# Patient Record
Sex: MALE | Race: WHITE | NOT HISPANIC OR LATINO | Employment: FULL TIME | URBAN - METROPOLITAN AREA
[De-identification: names, ages, dates, MRNs, and addresses within clinical notes are randomized per-mention and may not be internally consistent; named-entity substitution may affect disease eponyms.]

---

## 2017-10-23 ENCOUNTER — COMMUNICATION - HEALTHEAST (OUTPATIENT)
Dept: FAMILY MEDICINE | Facility: CLINIC | Age: 52
End: 2017-10-23

## 2017-11-20 ENCOUNTER — OFFICE VISIT - HEALTHEAST (OUTPATIENT)
Dept: FAMILY MEDICINE | Facility: CLINIC | Age: 52
End: 2017-11-20

## 2017-11-20 DIAGNOSIS — E78.5 HYPERLIPIDEMIA: ICD-10-CM

## 2017-11-20 DIAGNOSIS — Z12.5 PROSTATE CANCER SCREENING: ICD-10-CM

## 2017-11-20 DIAGNOSIS — N50.819 TESTICLE PAIN: ICD-10-CM

## 2017-11-20 DIAGNOSIS — E11.9 DM (DIABETES MELLITUS) (H): ICD-10-CM

## 2017-11-20 DIAGNOSIS — Z00.00 HEALTH MAINTENANCE EXAMINATION: ICD-10-CM

## 2017-11-20 DIAGNOSIS — R00.2 PALPITATION: ICD-10-CM

## 2017-11-20 DIAGNOSIS — E11.9 DIABETES MELLITUS (H): ICD-10-CM

## 2017-11-20 DIAGNOSIS — Z23 NEED FOR VACCINATION: ICD-10-CM

## 2017-11-20 LAB
CHOLEST SERPL-MCNC: 95 MG/DL
FASTING STATUS PATIENT QL REPORTED: YES
HBA1C MFR BLD: 6.8 % (ref 3.5–6)
HCV AB SERPL QL IA: NEGATIVE
HDLC SERPL-MCNC: 30 MG/DL
LDLC SERPL CALC-MCNC: 44 MG/DL
PSA SERPL-MCNC: 1.5 NG/ML (ref 0–3.5)
TRIGL SERPL-MCNC: 106 MG/DL

## 2017-11-20 ASSESSMENT — MIFFLIN-ST. JEOR: SCORE: 2454.21

## 2017-11-21 LAB
ATRIAL RATE - MUSE: 57 BPM
DIASTOLIC BLOOD PRESSURE - MUSE: NORMAL MMHG
INTERPRETATION ECG - MUSE: NORMAL
P AXIS - MUSE: 24 DEGREES
PR INTERVAL - MUSE: 160 MS
QRS DURATION - MUSE: 116 MS
QT - MUSE: 432 MS
QTC - MUSE: 420 MS
R AXIS - MUSE: -3 DEGREES
SYSTOLIC BLOOD PRESSURE - MUSE: NORMAL MMHG
T AXIS - MUSE: -4 DEGREES
VENTRICULAR RATE- MUSE: 57 BPM

## 2017-11-30 ENCOUNTER — COMMUNICATION - HEALTHEAST (OUTPATIENT)
Dept: FAMILY MEDICINE | Facility: CLINIC | Age: 52
End: 2017-11-30

## 2017-11-30 DIAGNOSIS — I10 HTN (HYPERTENSION): ICD-10-CM

## 2017-12-01 ENCOUNTER — HOSPITAL ENCOUNTER (OUTPATIENT)
Dept: CARDIOLOGY | Facility: CLINIC | Age: 52
Discharge: HOME | End: 2017-12-01
Attending: FAMILY MEDICINE

## 2017-12-01 DIAGNOSIS — R00.2 PALPITATION: ICD-10-CM

## 2017-12-04 ENCOUNTER — AMBULATORY - HEALTHEAST (OUTPATIENT)
Dept: LAB | Facility: CLINIC | Age: 52
End: 2017-12-04

## 2017-12-04 DIAGNOSIS — I10 HTN (HYPERTENSION): ICD-10-CM

## 2017-12-05 ENCOUNTER — AMBULATORY - HEALTHEAST (OUTPATIENT)
Dept: NURSING | Facility: CLINIC | Age: 52
End: 2017-12-05

## 2017-12-05 DIAGNOSIS — I10 ESSENTIAL HYPERTENSION: ICD-10-CM

## 2018-06-21 ENCOUNTER — OFFICE VISIT (OUTPATIENT)
Dept: URGENT CARE | Facility: URGENT CARE | Age: 53
End: 2018-06-21
Payer: COMMERCIAL

## 2018-06-21 VITALS
HEART RATE: 85 BPM | SYSTOLIC BLOOD PRESSURE: 135 MMHG | DIASTOLIC BLOOD PRESSURE: 90 MMHG | OXYGEN SATURATION: 97 % | RESPIRATION RATE: 14 BRPM | WEIGHT: 315 LBS | TEMPERATURE: 98.3 F

## 2018-06-21 DIAGNOSIS — H93.8X1 CRACKLING SOUND IN EAR, RIGHT: Primary | ICD-10-CM

## 2018-06-21 PROCEDURE — 99213 OFFICE O/P EST LOW 20 MIN: CPT | Performed by: NURSE PRACTITIONER

## 2018-06-21 RX ORDER — ASPIRIN 81 MG/1
81 TABLET ORAL
COMMUNITY
End: 2022-02-03

## 2018-06-21 RX ORDER — LISINOPRIL 5 MG/1
10 TABLET ORAL
COMMUNITY
Start: 2017-11-20 | End: 2021-12-23

## 2018-06-21 RX ORDER — ATORVASTATIN CALCIUM 10 MG/1
TABLET, FILM COATED ORAL
COMMUNITY
Start: 2017-11-20 | End: 2022-02-03

## 2018-06-21 RX ORDER — OFLOXACIN 3 MG/ML
10 SOLUTION AURICULAR (OTIC) 2 TIMES DAILY
Qty: 10 ML | Refills: 0 | Status: SHIPPED | OUTPATIENT
Start: 2018-06-21 | End: 2018-06-28

## 2018-06-21 ASSESSMENT — ENCOUNTER SYMPTOMS
DIARRHEA: 0
NAUSEA: 0
RHINORRHEA: 0
COUGH: 0
VOMITING: 0
SHORTNESS OF BREATH: 0
DIAPHORESIS: 0
CHILLS: 0
SORE THROAT: 0
FEVER: 0

## 2018-06-21 NOTE — MR AVS SNAPSHOT
After Visit Summary   6/21/2018    Walter Santa    MRN: [id number]           Patient Information     Date Of Birth          1965        Visit Information        Provider Department      6/21/2018 4:05 PM Adina Jeter NP [facility]        Today's Diagnoses     Crackling sound in ear, right    -  1       Follow-ups after your visit        Additional Services     OTOLARYNGOLOGY REFERRAL       Your provider has referred you to: [facility]: [facility] [phone]   [email or web address]    Please be aware that coverage of these services is subject to the terms and limitations of your health insurance plan.  Call member services at your health plan with any benefit or coverage questions.      Please bring the following with you to your appointment:    (1) Any X-Rays, CTs or MRIs which have been performed.  Contact the facility where they were done to arrange for  prior to your scheduled appointment.   (2) List of current medications  (3) This referral request   (4) Any documents/labs given to you for this referral                  Who to contact     If you have questions or need follow up information about today's clinic visit or your schedule please contact [facility] directly at [phone].  Normal or non-critical lab and imaging results will be communicated to you by MyChart, letter or phone within 4 business days after the clinic has received the results. If you do not hear from us within 7 days, please contact the clinic through MyChart or phone. If you have a critical or abnormal lab result, we will notify you by phone as soon as possible.  Submit refill requests through [employer] or call your pharmacy and they will forward the refill request to us. Please allow 3 business days for your refill to be completed.          Additional Information About Your Visit        MyChart Information   "   [employer] lets you send messages to your doctor, view your test results, renew your prescriptions, schedule appointments and more. To sign up, go to [email or web address] . Click on \"Log in\" on the left side of the screen, which will take you to the Welcome page. Then click on \"Sign up Now\" on the right side of the page.     You will be asked to enter the access code listed below, as well as some personal information. Please follow the directions to create your username and password.     Your access code is: [email or web address]  Expires: 2018  5:01 PM     Your access code will  in 90 days. If you need help or a new code, please call your [facility] clinic or [phone].        Care EveryWhere ID     This is your Care EveryWhere ID. This could be used by other organizations to access your [facility] medical records  [id number]        Your Vitals Were     Pulse Temperature Respirations Pulse Oximetry          85 98.3  F (36.8  C) (Oral) 14 97%         Blood Pressure from Last 3 Encounters:   18 135/90    Weight from Last 3 Encounters:   18 (!) 334 lb (151.5 kg)              We Performed the Following     OTOLARYNGOLOGY REFERRAL          Today's Medication Changes          These changes are accurate as of 18  5:01 PM.  If you have any questions, ask your nurse or doctor.               Start taking these medicines.        Dose/Directions    ofloxacin 0.3 % otic solution   Commonly known as:  FLOXIN   Used for:  Crackling sound in ear, right   Started by:  Adina Jeter NP        Dose:  10 drop   Place 10 drops into the right ear 2 times daily for 7 days   Quantity:  10 mL   Refills:  0            Where to get your medicines      These medications were sent to [id number] - [place] [address] AT [facility] & [address], [place] [address]     Phone:  [phone]     ofloxacin 0.3 % otic solution                Primary Care Provider Fax #    " Physician No Ref-Primary [phone]       No address on file        Equal Access to Services     FAITHBENITEZ SCOOBY : Hadii aad ku hadtiffanymelyssa Sovenancio, waraymondda luqadaha, qaybta kapierreda aureashantalsummer, ruby weathers michellevivienne petersonmonicamelanie moran. So [facility] [phone].    ATENCIÓN: Si habla español, tiene a kaye disposición servicios gratuitos de asistencia lingüística. Llame al [phone].    We comply with applicable federal civil rights laws and [place] laws. We do not discriminate on the basis of race, color, national origin, age, disability, sex, sexual orientation, or gender identity.            Thank you!     Thank you for choosing [facility]  for your care. Our goal is always to provide you with excellent care. Hearing back from our patients is one way we can continue to improve our services. Please take a few minutes to complete the written survey that you may receive in the mail after your visit with us. Thank you!             Your Updated Medication List - Protect others around you: Learn how to safely use, store and throw away your medicines at www.disposemymeds.org.          This list is accurate as of 6/21/18  5:01 PM.  Always use your most recent med list.                   Brand Name Dispense Instructions for use Diagnosis    aspirin 81 MG EC tablet      Take 81 mg by mouth        atorvastatin 10 MG tablet    LIPITOR     TAKE 1 TABLET BY MOUTH DAILY        lisinopril 5 MG tablet    PRINIVIL/ZESTRIL     Take 10 mg by mouth        metFORMIN 1000 MG tablet    GLUCOPHAGE     TAKE 1 TABLET BY MOUTH TWICE DAILY WITH MEALS.        ofloxacin 0.3 % otic solution    FLOXIN    10 mL    Place 10 drops into the right ear 2 times daily for 7 days    Crackling sound in ear, right

## 2018-06-21 NOTE — PROGRESS NOTES
SUBJECTIVE:   Walter Santa is a 53 year old male presenting with a chief complaint of   Chief Complaint   Patient presents with     Ear Problem     Patient complains of ear crackling for 3 days.       He is an established patient of [place].    Crackling in ear    Onset of symptoms was 3 day(s) ago.  Course of illness is same.    Severity moderate  Current and Associated symptoms: crackling in right ear.  Treatment measures tried include None tried.  Predisposing factors include ill contact: none.      Review of Systems   Constitutional: Negative for chills, diaphoresis and fever.   HENT: Negative for congestion, ear pain, rhinorrhea and sore throat.         Crackling sounds in right ear   Respiratory: Negative for cough and shortness of breath.    Gastrointestinal: Negative for diarrhea, nausea and vomiting.   All other systems reviewed and are negative.      No past medical history on file.  No family history on file.  Current Outpatient Prescriptions   Medication Sig Dispense Refill     aspirin 81 MG EC tablet Take 81 mg by mouth       atorvastatin (LIPITOR) 10 MG tablet TAKE 1 TABLET BY MOUTH DAILY       lisinopril (PRINIVIL/ZESTRIL) 5 MG tablet Take 10 mg by mouth       metFORMIN (GLUCOPHAGE) 1000 MG tablet TAKE 1 TABLET BY MOUTH TWICE DAILY WITH MEALS.       ofloxacin (FLOXIN) 0.3 % otic solution Place 10 drops into the right ear 2 times daily for 7 days 10 mL 0     Social History   Substance Use Topics     Smoking status: Never Smoker     Smokeless tobacco: Never Used     Alcohol use Not on file       OBJECTIVE  /90  Pulse 85  Temp 98.3  F (36.8  C) (Oral)  Resp 14  Wt (!) 334 lb (151.5 kg)  SpO2 97%    Physical Exam   Constitutional: He appears well-developed and well-nourished. No distress.   HENT:   Head: Normocephalic and atraumatic.   Right Ear: Tympanic membrane and external ear normal.   Left Ear: Tympanic membrane and external ear normal.   Mouth/Throat: Oropharynx is clear and  moist.   Small amounts of earwax in right ear   Eyes: EOM are normal. Pupils are equal, round, and reactive to light.   Neck: Normal range of motion. Neck supple.   Pulmonary/Chest: Effort normal and breath sounds normal. No respiratory distress.   Lymphadenopathy:     He has no cervical adenopathy.   Neurological: He is alert. No cranial nerve deficit.   Skin: Skin is warm and dry. He is not diaphoretic.   Psychiatric: He has a normal mood and affect.   Nursing note and vitals reviewed.    ASSESSMENT:      ICD-10-CM    1. Crackling sound in ear, right H93.8X1 ofloxacin (FLOXIN) 0.3 % otic solution     OTOLARYNGOLOGY REFERRAL          PLAN:  Cerumenosis is noted.  Wax is removed by syringing and manual debridement. Instructions for home care to prevent wax buildup are given. Slight redness of ear canal after the syringing, TM is normal. Crackling less after the ear flushing. A referral to ENT is made incase the symptoms continue.        There are no Patient Instructions on file for this visit.

## 2018-12-04 ENCOUNTER — OFFICE VISIT - HEALTHEAST (OUTPATIENT)
Dept: INTERNAL MEDICINE | Facility: CLINIC | Age: 53
End: 2018-12-04

## 2018-12-04 DIAGNOSIS — E11.9 TYPE 2 DIABETES MELLITUS WITHOUT COMPLICATION, WITHOUT LONG-TERM CURRENT USE OF INSULIN (H): ICD-10-CM

## 2018-12-04 DIAGNOSIS — Z00.00 ROUTINE GENERAL MEDICAL EXAMINATION AT A HEALTH CARE FACILITY: ICD-10-CM

## 2018-12-04 DIAGNOSIS — I10 ESSENTIAL HYPERTENSION WITH GOAL BLOOD PRESSURE LESS THAN 140/90: ICD-10-CM

## 2018-12-04 DIAGNOSIS — D69.6 THROMBOCYTOPENIA (H): ICD-10-CM

## 2018-12-04 DIAGNOSIS — E78.5 HYPERLIPIDEMIA: ICD-10-CM

## 2018-12-04 DIAGNOSIS — E66.01 SEVERE OBESITY (H): ICD-10-CM

## 2018-12-04 DIAGNOSIS — Z12.5 SCREENING FOR PROSTATE CANCER: ICD-10-CM

## 2018-12-04 LAB
ALBUMIN SERPL-MCNC: 4.3 G/DL (ref 3.5–5)
ALP SERPL-CCNC: 64 U/L (ref 45–120)
ALT SERPL W P-5'-P-CCNC: 29 U/L (ref 0–45)
ANION GAP SERPL CALCULATED.3IONS-SCNC: 13 MMOL/L (ref 5–18)
AST SERPL W P-5'-P-CCNC: 19 U/L (ref 0–40)
BILIRUB SERPL-MCNC: 0.6 MG/DL (ref 0–1)
BUN SERPL-MCNC: 13 MG/DL (ref 8–22)
CALCIUM SERPL-MCNC: 9.6 MG/DL (ref 8.5–10.5)
CHLORIDE BLD-SCNC: 102 MMOL/L (ref 98–107)
CHOLEST SERPL-MCNC: 103 MG/DL
CO2 SERPL-SCNC: 24 MMOL/L (ref 22–31)
CREAT SERPL-MCNC: 0.83 MG/DL (ref 0.7–1.3)
ERYTHROCYTE [DISTWIDTH] IN BLOOD BY AUTOMATED COUNT: 11.7 % (ref 11–14.5)
FASTING STATUS PATIENT QL REPORTED: YES
GFR SERPL CREATININE-BSD FRML MDRD: >60 ML/MIN/1.73M2
GLUCOSE BLD-MCNC: 130 MG/DL (ref 70–125)
HBA1C MFR BLD: 7.2 % (ref 3.5–6)
HCT VFR BLD AUTO: 45.1 % (ref 40–54)
HDLC SERPL-MCNC: 35 MG/DL
HGB BLD-MCNC: 15 G/DL (ref 14–18)
LDLC SERPL CALC-MCNC: 47 MG/DL
MCH RBC QN AUTO: 30.4 PG (ref 27–34)
MCHC RBC AUTO-ENTMCNC: 33.3 G/DL (ref 32–36)
MCV RBC AUTO: 91 FL (ref 80–100)
PLATELET # BLD AUTO: 155 THOU/UL (ref 140–440)
PMV BLD AUTO: 9.5 FL (ref 7–10)
POTASSIUM BLD-SCNC: 4.7 MMOL/L (ref 3.5–5)
PROT SERPL-MCNC: 7 G/DL (ref 6–8)
PSA SERPL-MCNC: 2.7 NG/ML (ref 0–3.5)
RBC # BLD AUTO: 4.94 MILL/UL (ref 4.4–6.2)
SODIUM SERPL-SCNC: 139 MMOL/L (ref 136–145)
TRIGL SERPL-MCNC: 107 MG/DL
WBC: 6.8 THOU/UL (ref 4–11)

## 2018-12-04 ASSESSMENT — MIFFLIN-ST. JEOR: SCORE: 2443.32

## 2019-02-25 ENCOUNTER — OFFICE VISIT - HEALTHEAST (OUTPATIENT)
Dept: INTERNAL MEDICINE | Facility: CLINIC | Age: 54
End: 2019-02-25

## 2019-02-25 DIAGNOSIS — L30.0 NUMMULAR ECZEMATOUS DERMATITIS: ICD-10-CM

## 2019-02-25 ASSESSMENT — MIFFLIN-ST. JEOR: SCORE: 2443.32

## 2019-03-21 ENCOUNTER — OFFICE VISIT - HEALTHEAST (OUTPATIENT)
Dept: INTERNAL MEDICINE | Facility: CLINIC | Age: 54
End: 2019-03-21

## 2019-03-21 DIAGNOSIS — E66.01 MORBID OBESITY (H): ICD-10-CM

## 2019-03-21 DIAGNOSIS — E66.9 DIABETES MELLITUS TYPE 2 IN OBESE: ICD-10-CM

## 2019-03-21 DIAGNOSIS — E11.69 DIABETES MELLITUS TYPE 2 IN OBESE: ICD-10-CM

## 2019-03-21 DIAGNOSIS — J30.2 SEASONAL ALLERGIES: ICD-10-CM

## 2019-03-21 DIAGNOSIS — H65.191 ACUTE MEE (MIDDLE EAR EFFUSION), RIGHT: ICD-10-CM

## 2019-03-21 DIAGNOSIS — I10 ESSENTIAL HYPERTENSION WITH GOAL BLOOD PRESSURE LESS THAN 140/90: ICD-10-CM

## 2019-03-21 ASSESSMENT — MIFFLIN-ST. JEOR: SCORE: 2425.18

## 2019-03-22 ENCOUNTER — COMMUNICATION - HEALTHEAST (OUTPATIENT)
Dept: INTERNAL MEDICINE | Facility: CLINIC | Age: 54
End: 2019-03-22

## 2019-09-05 ENCOUNTER — AMBULATORY - HEALTHEAST (OUTPATIENT)
Dept: INTERNAL MEDICINE | Facility: CLINIC | Age: 54
End: 2019-09-05

## 2019-09-05 ENCOUNTER — OFFICE VISIT - HEALTHEAST (OUTPATIENT)
Dept: INTERNAL MEDICINE | Facility: CLINIC | Age: 54
End: 2019-09-05

## 2019-09-05 DIAGNOSIS — E11.42 DIABETIC POLYNEUROPATHY ASSOCIATED WITH TYPE 2 DIABETES MELLITUS (H): ICD-10-CM

## 2019-09-05 DIAGNOSIS — E66.01 MORBID OBESITY (H): ICD-10-CM

## 2019-09-05 DIAGNOSIS — E11.69 DIABETES MELLITUS TYPE 2 IN OBESE: ICD-10-CM

## 2019-09-05 DIAGNOSIS — E53.8 VITAMIN B12 DEFICIENCY (NON ANEMIC): ICD-10-CM

## 2019-09-05 DIAGNOSIS — G25.81 RESTLESS LEGS SYNDROME (RLS): ICD-10-CM

## 2019-09-05 DIAGNOSIS — E66.9 DIABETES MELLITUS TYPE 2 IN OBESE: ICD-10-CM

## 2019-09-05 DIAGNOSIS — I10 ESSENTIAL HYPERTENSION WITH GOAL BLOOD PRESSURE LESS THAN 140/90: ICD-10-CM

## 2019-09-05 LAB
ANION GAP SERPL CALCULATED.3IONS-SCNC: 10 MMOL/L (ref 5–18)
BUN SERPL-MCNC: 14 MG/DL (ref 8–22)
CALCIUM SERPL-MCNC: 9.3 MG/DL (ref 8.5–10.5)
CHLORIDE BLD-SCNC: 102 MMOL/L (ref 98–107)
CO2 SERPL-SCNC: 24 MMOL/L (ref 22–31)
CREAT SERPL-MCNC: 0.93 MG/DL (ref 0.7–1.3)
FERRITIN SERPL-MCNC: 101 NG/ML (ref 27–300)
GFR SERPL CREATININE-BSD FRML MDRD: >60 ML/MIN/1.73M2
GLUCOSE BLD-MCNC: 138 MG/DL (ref 70–125)
HBA1C MFR BLD: 7.4 % (ref 3.5–6)
HGB BLD-MCNC: 14.4 G/DL (ref 14–18)
POTASSIUM BLD-SCNC: 4 MMOL/L (ref 3.5–5)
SODIUM SERPL-SCNC: 136 MMOL/L (ref 136–145)
VIT B12 SERPL-MCNC: 242 PG/ML (ref 213–816)

## 2019-09-05 ASSESSMENT — MIFFLIN-ST. JEOR: SCORE: 2434.25

## 2019-09-06 ENCOUNTER — COMMUNICATION - HEALTHEAST (OUTPATIENT)
Dept: INTERNAL MEDICINE | Facility: CLINIC | Age: 54
End: 2019-09-06

## 2019-12-18 ENCOUNTER — OFFICE VISIT - HEALTHEAST (OUTPATIENT)
Dept: INTERNAL MEDICINE | Facility: CLINIC | Age: 54
End: 2019-12-18

## 2019-12-18 DIAGNOSIS — R29.818 SUSPECTED SLEEP APNEA: ICD-10-CM

## 2019-12-18 DIAGNOSIS — I49.9 IRREGULAR HEART BEAT: ICD-10-CM

## 2019-12-18 DIAGNOSIS — R00.2 PALPITATIONS: ICD-10-CM

## 2019-12-18 LAB
ATRIAL RATE - MUSE: 67 BPM
DIASTOLIC BLOOD PRESSURE - MUSE: NORMAL
INTERPRETATION ECG - MUSE: NORMAL
P AXIS - MUSE: 27 DEGREES
PR INTERVAL - MUSE: 158 MS
QRS DURATION - MUSE: 110 MS
QT - MUSE: 398 MS
QTC - MUSE: 420 MS
R AXIS - MUSE: -7 DEGREES
SYSTOLIC BLOOD PRESSURE - MUSE: NORMAL
T AXIS - MUSE: 2 DEGREES
VENTRICULAR RATE- MUSE: 67 BPM

## 2019-12-18 ASSESSMENT — MIFFLIN-ST. JEOR: SCORE: 2434.25

## 2019-12-27 ENCOUNTER — HOSPITAL ENCOUNTER (OUTPATIENT)
Dept: CARDIOLOGY | Facility: CLINIC | Age: 54
Discharge: HOME | End: 2019-12-27
Attending: INTERNAL MEDICINE

## 2019-12-27 DIAGNOSIS — R00.2 PALPITATIONS: ICD-10-CM

## 2019-12-27 LAB
AORTIC ROOT: 3.5 CM
AORTIC VALVE MEAN VELOCITY: 101 CM/S
ASCENDING AORTA: 3.8 CM
AV DIMENSIONLESS INDEX VTI: 0.8
AV MEAN GRADIENT: 5 MMHG
AV PEAK GRADIENT: 8.4 MMHG
AV VALVE AREA: 3.7 CM2
AV VELOCITY RATIO: 0.9
BSA FOR ECHO PROCEDURE: 2.85 M2
CV ECHO HEIGHT: 77 IN
CV ECHO WEIGHT: 330 LBS
DOP CALC AO PEAK VEL: 145 CM/S
DOP CALC AO VTI: 32.8 CM
DOP CALC LVOT AREA: 4.52 CM2
DOP CALC LVOT DIAMETER: 2.4 CM
DOP CALC LVOT PEAK VEL: 129 CM/S
DOP CALC LVOT STROKE VOLUME: 123 CM3
DOP CALC MV VTI: 37 CM
DOP CALCLVOT PEAK VEL VTI: 27.2 CM
EJECTION FRACTION: 56 % (ref 55–75)
FRACTIONAL SHORTENING: 31.3 % (ref 28–44)
INTERVENTRICULAR SEPTUM IN END DIASTOLE: 1.4 CM (ref 0.6–1)
IVS/PW RATIO: 1.1
LA AREA 1: 24.5 CM2
LA AREA 2: 21.3 CM2
LEFT ATRIUM LENGTH: 5.44 CM
LEFT ATRIUM SIZE: 4 CM
LEFT ATRIUM TO AORTIC ROOT RATIO: 1.14 NO UNITS
LEFT ATRIUM VOLUME INDEX: 28.6 ML/M2
LEFT ATRIUM VOLUME: 81.5 ML
LEFT VENTRICLE CARDIAC INDEX: 2.8 L/MIN/M2
LEFT VENTRICLE CARDIAC OUTPUT: 8 L/MIN
LEFT VENTRICLE DIASTOLIC VOLUME INDEX: 83.5 CM3/M2 (ref 34–74)
LEFT VENTRICLE DIASTOLIC VOLUME: 238 CM3 (ref 62–150)
LEFT VENTRICLE HEART RATE: 65 BPM
LEFT VENTRICLE MASS INDEX: 91.1 G/M2
LEFT VENTRICLE SYSTOLIC VOLUME INDEX: 36.5 CM3/M2 (ref 11–31)
LEFT VENTRICLE SYSTOLIC VOLUME: 104 CM3 (ref 21–61)
LEFT VENTRICULAR INTERNAL DIMENSION IN DIASTOLE: 4.8 CM (ref 4.2–5.8)
LEFT VENTRICULAR INTERNAL DIMENSION IN SYSTOLE: 3.3 CM (ref 2.5–4)
LEFT VENTRICULAR MASS: 259.6 G
LEFT VENTRICULAR OUTFLOW TRACT MEAN GRADIENT: 3 MMHG
LEFT VENTRICULAR OUTFLOW TRACT MEAN VELOCITY: 83.1 CM/S
LEFT VENTRICULAR OUTFLOW TRACT PEAK GRADIENT: 7 MMHG
LEFT VENTRICULAR POSTERIOR WALL IN END DIASTOLE: 1.3 CM (ref 0.6–1)
LV STROKE VOLUME INDEX: 43.2 ML/M2
MITRAL VALVE E/A RATIO: 0.8
MITRAL VALVE MEAN INFLOW VELOCITY: 57.1 CM/S
MITRAL VALVE PEAK VELOCITY: 106 CM/S
MV AREA VTI: 3.32 CM2
MV AVERAGE E/E' RATIO: 7.6 CM/S
MV DECELERATION TIME: 162 MS
MV E'TISSUE VEL-LAT: 10 CM/S
MV E'TISSUE VEL-MED: 7.7 CM/S
MV LATERAL E/E' RATIO: 6.8
MV MEAN GRADIENT: 2 MMHG
MV MEDIAL E/E' RATIO: 8.8
MV PEAK A VELOCITY: 83.4 CM/S
MV PEAK E VELOCITY: 67.6 CM/S
MV PEAK GRADIENT: 4.5 MMHG
MV VALVE AREA BY CONTINUITY EQUATION: 3.3 CM2
NUC REST DIASTOLIC VOLUME INDEX: 5280 LBS
NUC REST SYSTOLIC VOLUME INDEX: 77 IN
RIGHT VENTRICULAR INTERNAL DIMENSION IN DYSTOLE: 3.7 CM
TRICUSPID VALVE ANULAR PLANE SYSTOLIC EXCURSION: 2.3 CM

## 2019-12-27 ASSESSMENT — MIFFLIN-ST. JEOR: SCORE: 2434.25

## 2019-12-30 ENCOUNTER — COMMUNICATION - HEALTHEAST (OUTPATIENT)
Dept: PHARMACY | Facility: CLINIC | Age: 54
End: 2019-12-30

## 2019-12-30 DIAGNOSIS — E78.5 HYPERLIPIDEMIA: ICD-10-CM

## 2019-12-30 DIAGNOSIS — I10 ESSENTIAL HYPERTENSION WITH GOAL BLOOD PRESSURE LESS THAN 140/90: ICD-10-CM

## 2020-01-06 ENCOUNTER — COMMUNICATION - HEALTHEAST (OUTPATIENT)
Dept: INTERNAL MEDICINE | Facility: CLINIC | Age: 55
End: 2020-01-06

## 2020-02-27 ENCOUNTER — COMMUNICATION - HEALTHEAST (OUTPATIENT)
Dept: PHARMACY | Facility: CLINIC | Age: 55
End: 2020-02-27

## 2020-02-27 DIAGNOSIS — E11.9 TYPE 2 DIABETES MELLITUS WITHOUT COMPLICATION, WITHOUT LONG-TERM CURRENT USE OF INSULIN (H): ICD-10-CM

## 2020-11-23 ENCOUNTER — COMMUNICATION - HEALTHEAST (OUTPATIENT)
Dept: PHARMACY | Facility: CLINIC | Age: 55
End: 2020-11-23

## 2020-11-23 DIAGNOSIS — E78.5 HYPERLIPIDEMIA: ICD-10-CM

## 2020-11-23 DIAGNOSIS — I10 ESSENTIAL HYPERTENSION WITH GOAL BLOOD PRESSURE LESS THAN 140/90: ICD-10-CM

## 2021-02-22 ENCOUNTER — COMMUNICATION - HEALTHEAST (OUTPATIENT)
Dept: PHARMACY | Facility: CLINIC | Age: 56
End: 2021-02-22

## 2021-02-22 DIAGNOSIS — E11.9 TYPE 2 DIABETES MELLITUS WITHOUT COMPLICATION, WITHOUT LONG-TERM CURRENT USE OF INSULIN (H): ICD-10-CM

## 2021-03-31 ENCOUNTER — OFFICE VISIT (OUTPATIENT)
Dept: URGENT CARE | Facility: URGENT CARE | Age: 56
End: 2021-03-31
Payer: COMMERCIAL

## 2021-03-31 VITALS
HEART RATE: 86 BPM | OXYGEN SATURATION: 98 % | SYSTOLIC BLOOD PRESSURE: 155 MMHG | TEMPERATURE: 97.4 F | WEIGHT: 315 LBS | DIASTOLIC BLOOD PRESSURE: 83 MMHG

## 2021-03-31 DIAGNOSIS — H93.8X3 EAR CONGESTION, BILATERAL: ICD-10-CM

## 2021-03-31 DIAGNOSIS — I10 ESSENTIAL HYPERTENSION: Primary | ICD-10-CM

## 2021-03-31 PROCEDURE — 99213 OFFICE O/P EST LOW 20 MIN: CPT | Performed by: FAMILY MEDICINE

## 2021-03-31 NOTE — PROGRESS NOTES
Assessment & Plan     Essential hypertension  Continue ongoing management. Limit use of decongestant to 3 days or less    Ear congestion, bilateral  No e/o infection on exam slight clear fluid congestion -- trial of antihistamines/decongestants . Continue nasal steroid use. OTC analgesics PRN for pain       Hay Gomez MD   [place] [facility]    Ronni Watson is a 56 year old male who presents to clinic today for the following health issues:  Chief Complaint   Patient presents with     Urgent Care     Ear Problem     c/o ear pain for 1 pain     HPI    His hearing he thinks is fine. There is no ringing or echoing.     Denies fevers, sore throat, cold symptoms.   No recent travel or swimming.     4 years ago he did have to get his right ear irrigated for cerumen buildup        There are no active problems to display for this patient.      Current Outpatient Medications   Medication     aspirin 81 MG EC tablet     atorvastatin (LIPITOR) 10 MG tablet     lisinopril (PRINIVIL/ZESTRIL) 5 MG tablet     metFORMIN (GLUCOPHAGE) 1000 MG tablet     No current facility-administered medications for this visit.           Objective    BP (!) 155/83   Pulse 86   Temp 97.4  F (36.3  C) (Tympanic)   Wt (!) 151.5 kg (334 lb)   SpO2 98%   Physical Exam     Ears: canals clear, right TM has more clear congestion than the left no erythema or bulging bilaterally      No results found for any visits on 03/31/21.              The use of Dragon/[employer] dictation services may have been used to construct the content in this note; any grammatical or spelling errors are non-intentional. Please contact the author of this note directly if you are in need of any clarification.

## 2021-03-31 NOTE — PATIENT INSTRUCTIONS
Nasal fluticasone spray once a day      Oral decongestant for up to 3 days (sudafed or generic)       Oral cetirizine or loratadine (Generic antihistamine) once daily      Tylenol and /or ibuprofen every 4-6 hours

## 2021-05-31 VITALS — HEIGHT: 77 IN | WEIGHT: 315 LBS | BODY MASS INDEX: 37.19 KG/M2

## 2021-06-01 ENCOUNTER — RECORDS - HEALTHEAST (OUTPATIENT)
Dept: ADMINISTRATIVE | Facility: CLINIC | Age: 56
End: 2021-06-01

## 2021-06-01 NOTE — TELEPHONE ENCOUNTER
Primary pharmacy changed to [facility] per patient request.    Jodie WALTER LPN .......... 11:30 AM  09/06/19

## 2021-06-01 NOTE — PROGRESS NOTES
Office Visit - Follow Up   Walter Santa   54 y.o. male    Date of Visit: 9/5/2019    Chief Complaint   Patient presents with     Diabetes        Assessment and Plan   1. Essential hypertension with goal blood pressure less than 140/90  Blood pressure controlled continue current medication    2. Diabetes mellitus type 2 in obese (H)  Moderately well controlled on metformin.  Discussed importance of reduction in carbohydrates and calories.  Discussed once weekly injectable medication.  He is interested in this.  We will have him meet with our diabetes educator for comprehensive diabetes management and instruction on injectable medication  - Glycosylated Hemoglobin A1c  - Basic Metabolic Panel  - Ambulatory referral to Diabetes Education Program (CDE)    3. Diabetic polyneuropathy associated with type 2 diabetes mellitus (H)  Not clearly neuropathy from diabetes.  Discussed getting a shoe with a wider box.  Better control of diabetes, check labs as below  - Vitamin B12    4. Restless legs syndrome (RLS)  Recent hemoglobin was normal colonoscopy has been historically normal due for upcoming colonoscopy.  Regarding some of his edema and stasis dermatitis discussed compression garments and leg elevation  - Ferritin  - Hemoglobin    5. Obesity (BMI 35.0-39.9) with comorbidity (H)  The following high BMI interventions were performed this visit: encouragement to exercise and lifestyle education regarding diet    Return in about 4 months (around 1/5/2020) for annual physical.     History of Present Illness   This 54 y.o. old man comes in for follow-up.  Overall doing okay.  Some discomfort on the lateral aspect of his left foot.  Wonders if his shoes are too tight.  Nothing in the right foot.  Occasional small amount of swelling after sitting all day.  Has some restlessness in his left leg when he is trying to sleep.  No back pain.  No specific radicular pain.  No weakness.  Blood sugars have been a little bit more  "elevated.  Recently moved.    Review of Systems: A comprehensive review of systems was negative except as noted.     Medications, Allergies and Problem List   Reviewed, reconciled and updated  Post Discharge Medication Reconciliation Status:      Physical Exam   General Appearance:   No acute distress    /74 (Patient Site: Left Arm, Patient Position: Sitting, Cuff Size: Adult Regular)   Pulse 68   Ht 6' 5\" (1.956 m)   Wt (!) 330 lb (149.7 kg)   SpO2 98%   BMI 39.13 kg/m      HEENT exam is unremarkable  Neck supple no thyromegaly or nodule palpable  Lymphatic no cervical lymphadenopathy  Cardiovascular regular rate and rhythm no murmur gallop or rub  Pulmonary lungs are clear to auscultation bilaterally  Gastrointestinal abdomen soft nontender nondistended no organomegaly  Neurologic exam is non focal   Psychiatric pleasant, no confusion or agitation   Normal diabetic foot exam  Some hemosiderin deposition and bases dermatitis of the lower extremities     Additional Information   Current Outpatient Medications   Medication Sig Dispense Refill     aspirin 81 MG EC tablet Take 81 mg by mouth daily.       atorvastatin (LIPITOR) 20 MG tablet Take 1 tablet (20 mg total) by mouth daily. 90 tablet 3     BLOOD-GLUCOSE METER MISC Accu-Chek Augustina Plus device with kit.  Use as directed.       lisinopril (PRINIVIL,ZESTRIL) 10 MG tablet Take 1 tablet (10 mg total) by mouth daily. 90 tablet 3     metFORMIN (GLUCOPHAGE) 1000 MG tablet TAKE 1 TABLET BY MOUTH TWICE DAILY WITH MEALS.. 180 tablet 3     No current facility-administered medications for this visit.      No Known Allergies  Social History     Tobacco Use     Smoking status: Never Smoker     Smokeless tobacco: Never Used   Substance Use Topics     Alcohol use: Yes     Frequency: Monthly or less     Drinks per session: 1 or 2     Comment: occ.     Drug use: No       Review and/or order of clinical lab tests:  Review and/or order of radiology tests:  Review and/or " order of medicine tests:  Discussion of test results with performing physician:  Decision to obtain old records and/or obtain history from someone other than the patient:  Review and summarization of old records and/or obtaining history from someone other than the patient and.or discussion of case with another health care provider:  Independent visualization of image, tracing or specimen itself:    Time:      Gerry Ibarra MD

## 2021-06-01 NOTE — TELEPHONE ENCOUNTER
PT WOULD LIKE PHARMACY CHANGED TO THE [facility] ON THE 1st FLOOR [address] [facility]

## 2021-06-02 VITALS — BODY MASS INDEX: 37.19 KG/M2 | WEIGHT: 315 LBS | HEIGHT: 77 IN

## 2021-06-02 VITALS — HEIGHT: 77 IN | WEIGHT: 315 LBS | BODY MASS INDEX: 37.19 KG/M2

## 2021-06-03 ENCOUNTER — RECORDS - HEALTHEAST (OUTPATIENT)
Dept: ADMINISTRATIVE | Facility: CLINIC | Age: 56
End: 2021-06-03

## 2021-06-03 VITALS
SYSTOLIC BLOOD PRESSURE: 132 MMHG | HEIGHT: 77 IN | WEIGHT: 315 LBS | HEART RATE: 68 BPM | DIASTOLIC BLOOD PRESSURE: 74 MMHG | BODY MASS INDEX: 37.19 KG/M2 | OXYGEN SATURATION: 98 %

## 2021-06-04 VITALS — HEIGHT: 77 IN | WEIGHT: 315 LBS | BODY MASS INDEX: 37.19 KG/M2

## 2021-06-04 VITALS
OXYGEN SATURATION: 100 % | DIASTOLIC BLOOD PRESSURE: 86 MMHG | HEIGHT: 77 IN | HEART RATE: 79 BPM | SYSTOLIC BLOOD PRESSURE: 134 MMHG | BODY MASS INDEX: 37.19 KG/M2 | WEIGHT: 315 LBS

## 2021-06-04 NOTE — PROGRESS NOTES
"  Office Visit - Follow Up   Walter Santa   54 y.o. male    Date of Visit: 12/18/2019    Chief Complaint   Patient presents with     Irregular Heart Beat        Assessment and Plan   1. Irregular heart beat  EKG today shows normal sinus rhythm.  We will do a prolonged whole event monitor.  Further recommendation based on results.  TSH has been okay recently.  Other labs okay.  - Electrocardiogram Perform and Read    2. Palpitations  - VICTORINA Monitor Hook-Up; Future  - Echo Complete; Future    3. Suspected sleep apnea  - Ambulatory referral to Sleep Medicine    Return in about 4 weeks (around 1/15/2020) for recheck.     History of Present Illness   This 54 y.o. old man comes in for evaluation of palpitations.  He is been noticing these with increased frequency over the past couple of weeks.  1 day he had quite significant palpitations where he could see his shirt moving.  He has had a Holter monitor 24 hours before which showed some PVCs otherwise unremarkable.  He does snore at night and is having a hard time sleeping.  He is never had a sleep study.  He has no chest pain or shortness of breath.    Review of Systems: A comprehensive review of systems was negative except as noted.     Medications, Allergies and Problem List   Reviewed, reconciled and updated  Post Discharge Medication Reconciliation Status:      Physical Exam   General Appearance:   No acute distress    /86 (Patient Site: Right Arm, Patient Position: Sitting, Cuff Size: Adult Regular)   Pulse 79   Ht 6' 5\" (1.956 m)   Wt (!) 330 lb (149.7 kg)   SpO2 100%   BMI 39.13 kg/m      HEENT exam is unremarkable  Neck supple no thyromegaly or nodule palpable  Lymphatic no cervical lymphadenopathy  Cardiovascular regular rate and rhythm no murmur gallop or rub  Pulmonary lungs are clear to auscultation bilaterally  Gastrointestinal abdomen soft nontender nondistended no organomegaly  Neurologic exam is non focal  Psychiatric pleasant, no confusion " or agitation        Additional Information   Current Outpatient Medications   Medication Sig Dispense Refill     aspirin 81 MG EC tablet Take 81 mg by mouth daily.       atorvastatin (LIPITOR) 20 MG tablet Take 1 tablet (20 mg total) by mouth daily. 90 tablet 3     BLOOD-GLUCOSE METER MISC Accu-Chek Augustina Plus device with kit.  Use as directed.       cyanocobalamin 1000 MCG tablet Take 1 tablet (1,000 mcg total) by mouth daily. 100 tablet 11     lisinopril (PRINIVIL,ZESTRIL) 10 MG tablet Take 1 tablet (10 mg total) by mouth daily. 90 tablet 3     metFORMIN (GLUCOPHAGE) 1000 MG tablet TAKE 1 TABLET BY MOUTH TWICE DAILY WITH MEALS.. 180 tablet 3     No current facility-administered medications for this visit.      No Known Allergies  Social History     Tobacco Use     Smoking status: Never Smoker     Smokeless tobacco: Never Used   Substance Use Topics     Alcohol use: Yes     Frequency: Monthly or less     Drinks per session: 1 or 2     Comment: occ.     Drug use: No       Review and/or order of clinical lab tests:  Review and/or order of radiology tests:  Review and/or order of medicine tests:  Discussion of test results with performing physician:  Decision to obtain old records and/or obtain history from someone other than the patient:  Review and summarization of old records and/or obtaining history from someone other than the patient and.or discussion of case with another health care provider:  Independent visualization of image, tracing or specimen itself:    Time:      Gerry bIarra MD

## 2021-06-14 NOTE — PROGRESS NOTES
I met with Walter Santa at the request of Dr Spears to recheck his blood pressure.  Blood pressure medications on the MAR were reviewed with patient.    Patient has taken all medications as per usual regimen: Yes  Patient reports tolerating them without any issues or concerns: Yes    Vitals:    12/05/17 1505   BP: 120/82   Patient Site: Left Arm   Patient Position: Sitting   Cuff Size: Adult Large   Pulse: 80       Blood pressure was taken, previous encounter was reviewed, recorded blood pressure below 140/90.  Patient was discharged and the note will be sent to the provider for final review.     Last physical 11/20/17--patient was increased from 5mg-->10mg Lisinopril. Patient reports he already checked his nonfasting labs as well.      7. HTN  Increase lisinopril to 10 mg daily and return in 2 weeks for nurse blood pressure check and nonfasting BMP.

## 2021-06-14 NOTE — PROGRESS NOTES
ASSESSMENT & PLAN:    1. DM (diabetes mellitus)  Overdue for eye exam, he will schedule.  Discussed the importance of annual dilated diabetic eye exam.  A1c shows good control of diabetes.  Continue with metformin.  Urine microalbumin screen today.  Continue baby aspirin daily, as well as lisinopril which had been prescribed for renal protection, but likely underlying diagnosis of hypertension, his blood pressure has been increasing over the last couple of years.  - Glycosylated Hemoglobin A1c  - Lipid Cascade  - Comprehensive Metabolic Panel  - Microalbumin, Random Urine  - metFORMIN (GLUCOPHAGE) 1000 MG tablet; TAKE 1 TABLET BY MOUTH TWICE DAILY WITH MEALS.  Dispense: 180 tablet; Refill: 3    2. Hyperlipidemia  Fasting lipids and CMP today.  As long as adequately controlled we will continue with atorvastatin 10 mg daily  - atorvastatin (LIPITOR) 10 MG tablet; TAKE 1 TABLET BY MOUTH DAILY  Dispense: 90 tablet; Refill: 3    3. Need for vaccination  Tetanus booster today, and medications otherwise up-to-date  - Td, Preservative Free (green label)    4. Testicle pain, prostate cancer screening  He would like to see urology for his testicular symptoms as well as prostate cancer screening.  PSA with labs today and referral to urology placed.  He will schedule.  - Ambulatory referral to Urology  - PSA (Prostatic-Specific Antigen), Annual Screen    5. Palpitation  No red flag symptoms.  Discussed signs or symptoms for which to be immediately reevaluated.  EKG today shows sinus bradycardia, possible Q waves in inferior leads, cardiology review pending.  CBC and thyroid cascade for palpitations today.  Holter monitor ordered.  - Electrocardiogram Perform and Read  - HM2(CBC w/o Differential)  - Thyroid Cascade  - Holter Monitor; Future    6. Health maintenance examination  Immunizations up-to-date other than tetanus as above.  Colonoscopy up-to-date August 2013, repeat 10 years.  Hep C screening based on age.  - Hepatitis C  Antibody (Anti-HCV)    7. HTN  Increase lisinopril to 10 mg daily and return in 2 weeks for nurse blood pressure check and nonfasting BMP.      Patient Instructions   Make an appointment in two weeks for a nurse blood pressure check and labs.      Orders Placed This Encounter   Procedures     Td, Preservative Free (green label)     Glycosylated Hemoglobin A1c     Lipid Cascade     Order Specific Question:   Fasting is required?     Answer:   Yes     Comprehensive Metabolic Panel     Microalbumin, Random Urine     PSA (Prostatic-Specific Antigen), Annual Screen     HM2(CBC w/o Differential)     Thyroid Cascade     Hepatitis C Antibody (Anti-HCV)     Ambulatory referral to Urology     Referral Priority:   Routine     Referral Type:   Consultation     Referral Reason:   Evaluation and Treatment     Requested Specialty:   Urology     Number of Visits Requested:   1     Electrocardiogram Perform and Read     Medications Discontinued During This Encounter   Medication Reason     metFORMIN (GLUCOPHAGE) 1000 MG tablet Reorder     atorvastatin (LIPITOR) 10 MG tablet Reorder     lisinopril (PRINIVIL,ZESTRIL) 5 MG tablet Reorder     lisinopril (PRINIVIL,ZESTRIL) 5 MG tablet Reorder       Return in about 2 weeks (around 12/4/2017) for nurse only appoint for BP recheck.    CHIEF COMPLAINT:  Chief Complaint   Patient presents with     Annual Exam     fasting labs today; no questions or concerns       HISTORY OF PRESENT ILLNESS:  Walter is a 52 y.o. male presenting to the clinic today for a physical examination.    Diabetes: He notes that he gets some burning pain in the sides of his feet.Today, his hemoglobin A1c was 6.8%. He states that this is well controlled. He is currently taking metformin daily and does not mention any adverse side effects. Overdue for eye exam.    Chest Flutter: He notes that roughly once per week he notices his heart beating fast, but can be as infrequent as monthly. The episodes last a couple of minutes.  He describes the feeling as more of a pressure than anything else. The episodes tend to occur at rest. He denies that this ever occurs during physical activity. He denies any accompanying SOB, chest pain, or dizziness.    Hypertension: He mentions that recently he has noticed an increase in his blood pressure. He does not have a blood pressure cuff at home. Blood pressure was rechecked today by MD and was 132/82.  He is currently taking 5 mg lisinopril.    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 Reports it feels like testes will do up into canal sometimes when he is lying in bed at night, causes discomfort. Similar symptoms 20 years ago. Would like referral to urology for further evaluation.     Hypercholesterolemia: He has been taking atorvastatin daily and has been tolerating it well. He is also taking aspirin daily.   Health Maintenance: He is up to date on his seasonal flu shot. He agrees to get his tetanus booster today. He is up to date on colonoscopy. He agrees to have a hepatitis C screening. He has been using cheaters more and more, and will getting an eye exam soon. He is also in need of a diabetic eye exam. He goes to [facility].     REVIEW OF SYSTEMS:   He notes that he would like his ears checked, as he right ear feels wet. He also explains that he has noticed thickening and discoloration of his left big toenail. Head is positive for headaches and head injury. He snores every once in a while. He does not stop breathing during sleep. He had hand surgery last year and has noticed small masses in the palm of his hand. He fell off of his boat this summer and sustained a gash on the back of his right calf. Ears positive for discharge. Eyes positive for blurred vision. Mouth and throad positive for gum disease. He has been experiencing dry coughing and restless legs. He does not have any problems with  "urination.  He has noticed that if he drinks beer he will urinate more.  Would like prostate screening. All other systems are negative.    PFSH:  His father has hypertension.      Social History     Social History     Marital status:      Spouse name: N/A     Number of children: N/A     Years of education: N/A     Occupational History     Not on file.     Social History Main Topics     Smoking status: Never Smoker     Smokeless tobacco: Not on file     Alcohol use Yes      Comment: occ.     Drug use: No     Sexual activity: Not on file     Other Topics Concern     Not on file     Social History Narrative       History reviewed. No pertinent past medical history.    Past Surgical History:   Procedure Laterality Date     PALMAR FASCIECTOMY Left 2016    Release left middle and small fingers, Dr. Mino Soto     SHOULDER SURGERY Right     3 surgeries of R shoulder, bursa problem. has residual weakness in elevation of shoulder.      TOTAL KNEE ARTHROPLASTY Right 2011       No Known Allergies    Active Ambulatory Problems     Diagnosis Date Noted     Frequent, Full-bladder Emptying (Polyuria)      Obesity      Earache In The Right Ear      Hyperlipidemia      Edema      Headache      Diabetes Mellitus      Attention Deficit Disorder Without Hyperactivity      Resolved Ambulatory Problems     Diagnosis Date Noted     No Resolved Ambulatory Problems     No Additional Past Medical History       Family History   Problem Relation Age of Onset     Hypertension Mother       at age 51 from brain aneursym      Hypertension Father      Kidney cancer Sister        VITALS:  Vitals:    17 0802 17 0855   BP: 140/90 132/82   Patient Site: Right Arm Right Arm   Patient Position: Sitting Sitting   Cuff Size: Adult Large    Pulse: 68    Resp: 20    Temp: 98.2  F (36.8  C)    TempSrc: Oral    Weight: (!) 334 lb 6.4 oz (151.7 kg)    Height: 6' 5\" (1.956 m)      Wt Readings from Last 3 Encounters:   17 " (!) 334 lb 6.4 oz (151.7 kg)   10/21/16 (!) 335 lb (152 kg)   03/09/16 (!) 334 lb 6.4 oz (151.7 kg)       PHYSICAL EXAM:  GENERAL:  Pleasant, well-appearing man in no acute distress.  BP recheckd by MD: 132/82, RUE, sitting.   VITAL SIGNS:  Reviewed.  HEENT:  Pupils are equal, round, and reactive to light.  Sclerae and  conjunctivae clear.  TMs are clear bilaterally.  Oropharynx is clear with  moist mucous membranes.  NECK:  Supple without lymphadenopathy or thyromegaly.  No carotid bruits.  CARDIOVASCULAR:  Heart regular rate and rhythm without murmur.  Normal S1 and  S2.  LUNGS:  Clear to auscultation bilaterally without wheezes or crackles.  Good  air movement throughout.    ABDOMEN:  Soft, nontender, and nondistended without  guarding or rebound.  No organomegaly.  EXTREMITIES:  Warm and well perfused without edema. Dorsalis pedis pulses easily palpable and symmetric bilaterally.  NEURO: Alert and oriented. Grossly nonfocal.  PSYCHIATRIC: Presents on time and well groomed.  Normal speech and thought content.  Full affect.  No abnormal movements or behaviors noted.  DIABETIC FOOT EXAM: Normal monofilament test. Skin without lesions.Nails in good repair. No deformities. Fungal changes on left great toe. Slightly discolored with debris under nail, not significantly thickened, no erythema of surrounding skin.     DATA REVIEWED:  ADDITIONAL HISTORY SUMMARIZED (2): None.   DECISION TO OBTAIN EXTRA INFORMATION (2): None.   RADIOLOGY TESTS SUMMARIZED OR ORDERED (1): None.  LABS REVIEWED OR ORDERED (1): Labs were ordered today.  MEDICINE TESTS SUMMARIZED OR ORDERED (1): Ordered ECG today. Ordered holter monitor today.   INDEPENDENT REVIEW OF EKG OR X-RAY (2 each): Independent review of ECG as ordered above, see in assessment.     The visit lasted a total of 17 minutes face to face with the patient. Over 50% of the time was spent counseling and educating the patient about general wellness and heart palpitations.    I,  Valentina Canales, am scribing for and in the presence of, Dr. Spears.    I, Dr. Spears, personally performed the services described in this documentation, as scribed by Valentina Canales in my presence, and it is both accurate and complete.    MEDICATIONS:  Current Outpatient Prescriptions   Medication Sig Dispense Refill     aspirin 81 MG EC tablet Take 81 mg by mouth daily.       atorvastatin (LIPITOR) 10 MG tablet TAKE 1 TABLET BY MOUTH DAILY 90 tablet 3     BLOOD-GLUCOSE METER MISC Accu-Chek Augustina Plus device with kit.  Use as directed.       lisinopril (PRINIVIL,ZESTRIL) 5 MG tablet Take 2 tablets (10 mg total) by mouth daily. TAKE 1 TABLET BY MOUTH EVERY  tablet 3     metFORMIN (GLUCOPHAGE) 1000 MG tablet TAKE 1 TABLET BY MOUTH TWICE DAILY WITH MEALS. 180 tablet 3     No current facility-administered medications for this visit.          Total Data Points:4

## 2021-06-16 PROBLEM — I10 ESSENTIAL HYPERTENSION WITH GOAL BLOOD PRESSURE LESS THAN 140/90: Status: ACTIVE | Noted: 2018-12-04

## 2021-06-16 PROBLEM — E66.01 MORBID OBESITY (H): Status: ACTIVE | Noted: 2018-12-04

## 2021-06-22 NOTE — PROGRESS NOTES
Office Visit - Physical   Walter Santa   53 y.o.  male    Date of visit: 12/4/2018  Physician: Gerry Ibarra MD     Assessment and Plan   1. Routine general medical examination at a health care facility  This is a 53-year-old man with issues as discussed below.  Cancer screening up-to-date.  Immunizations are up-to-date.  Ongoing healthy lifestyle discussed and recommended    2. Screening for prostate cancer  - PSA (Prostatic-Specific Antigen), Annual Screen    3. Type 2 diabetes mellitus without complication, without long-term current use of insulin (H)  Continue with metformin, aspirin, statin, ACE inhibitor, annual diabetic eye exam and excellent diabetic foot care  - Lipid Cascade  - Glycosylated Hemoglobin A1c  - Comprehensive Metabolic Panel    4. Essential hypertension with goal blood pressure less than 140/90  Blood pressure looks okay continue current medications    5. Hyperlipidemia  - atorvastatin (LIPITOR) 20 MG tablet; Take 1 tablet (20 mg total) by mouth daily.  Dispense: 90 tablet; Refill: 3    6. Thrombocytopenia (H)  Mild and stable  - HM2(CBC w/o Differential)    7. Severe obesity (H)  The following high BMI interventions were performed this visit: encouragement to exercise and lifestyle education regarding diet    Return in about 6 months (around 6/4/2019) for diabetes follow up.     Chief Complaint   Chief Complaint   Patient presents with     Annual Exam     fasting     Diabetes     Medication Refill        Patient Profile   Social History     Social History Narrative    Lives with his wife, Lashawn.  Two sons, Carloz ([place]) and Israel ([facility]), .  Works in security at [employer].  Two granddaughters.  Lashawn runs an in home .          Past Medical History   Patient Active Problem List   Diagnosis     Obesity     Hyperlipidemia     Diabetes Mellitus     Attention Deficit Disorder Without Hyperactivity     Obesity (BMI 35.0-39.9) with comorbidity (H)     Essential  hypertension with goal blood pressure less than 140/90       Past Surgical History  He has a past surgical history that includes Hernandez fasciotomy (Left, 2016); Shoulder surgery (Right); Knee arthroscopy (Right, 2011); Tonsillectomy; and PALMAR FASCIECTOMY LEFT HAND RELEASE LEFT MIDDLE AND SMALL FINGERS (Left, 3/9/2016).     History of Present Illness   This 53 y.o. old man comes in to [facility] care.  His medical history was reviewed, electronic medical record is updated to reflect this note.  Overall he is fairly healthy.  He does have underlying diabetes, hypertension and hyperlipidemia.  These have been well controlled.  He exercises regularly in the form of walking.  He has no chest pain or shortness of breath.  He has been having some heel pain but no numbness in his feet or neuropathy.  He gets regular eye exams have been okay.    Review of Systems: A comprehensive review of systems was negative except as noted.     Medications and Allergies   Current Outpatient Medications   Medication Sig Dispense Refill     aspirin 81 MG EC tablet Take 81 mg by mouth daily.       atorvastatin (LIPITOR) 20 MG tablet Take 1 tablet (20 mg total) by mouth daily. 90 tablet 3     BLOOD-GLUCOSE METER MISC Accu-Chek Augustina Plus device with kit.  Use as directed.       lisinopril (PRINIVIL,ZESTRIL) 10 MG tablet Take 1 tablet (10 mg total) by mouth daily. 90 tablet 3     metFORMIN (GLUCOPHAGE) 1000 MG tablet TAKE 1 TABLET BY MOUTH TWICE DAILY WITH MEALS.. 180 tablet 3     No current facility-administered medications for this visit.      No Known Allergies     Family and Social History   Family History   Problem Relation Age of Onset     Hypertension Mother          at age 51 from brain aneursym      Hypertension Father      Atrial fibrillation Father      Alcohol abuse Father      Tongue cancer Father      Kidney cancer Sister      No Medical Problems Sister      No Medical Problems Son      No Medical Problems Son      "    Social History     Tobacco Use     Smoking status: Never Smoker     Smokeless tobacco: Never Used   Substance Use Topics     Alcohol use: Yes     Frequency: Monthly or less     Drinks per session: 1 or 2     Comment: occ.     Drug use: No        Physical Exam   General Appearance:   No acute distress    /78 (Patient Site: Left Arm, Patient Position: Sitting, Cuff Size: Adult Regular)   Pulse 68   Ht 6' 5\" (1.956 m)   Wt (!) 332 lb (150.6 kg)   SpO2 98%   BMI 39.37 kg/m      EYES: Eyelids, conjunctiva, and sclera were normal. Pupils were normal. Cornea, iris, and lens were normal bilaterally.  HEAD, EARS, NOSE, MOUTH, AND THROAT: Head and face were normal. Hearing was normal to voice and the ears were normal to external exam. Nose appearance was normal and there was no discharge. Oropharynx was normal.  NECK: Neck appearance was normal. There were no neck masses and the thyroid was not enlarged.  RESPIRATORY: Breathing pattern was normal and the chest moved symmetrically.  Percussion/auscultatory percussion was normal.  Lung sounds were normal and there were no abnormal sounds.  CARDIOVASCULAR: Heart rate and rhythm were normal.  S1 and S2 were normal and there were no extra sounds or murmurs. Peripheral pulses in arms and legs were normal.  Jugular venous pressure was normal.  There was no peripheral edema.  GASTROINTESTINAL: The abdomen was normal in contour.  Bowel sounds were present.  Percussion detected no organ enlargement or tenderness.  Palpation detected no tenderness, mass, or enlarged organs.   MUSCULOSKELETAL: Skeletal configuration was normal and muscle mass was normal for age. Joint appearance was overall normal.  LYMPHATIC: There were no enlarged nodes.  SKIN/HAIR/NAILS: Skin color was normal.  There were no skin lesions.  Hair and nails were normal.  NEUROLOGIC: The patient was alert and oriented to person, place, time, and circumstance. Speech was normal. Cranial nerves were normal. " Motor strength was normal for age. The patient was normally coordinated.  PSYCHIATRIC:  Mood and affect were normal and the patient had normal recent and remote memory. The patient's judgment and insight were normal.       Additional Information        Gerry Ibarra MD  Internal Medicine  Contact me at [phone]

## 2021-06-24 NOTE — PROGRESS NOTES
"  Office Visit - Follow Up   Walter Santa   53 y.o. male    Date of Visit: 2/25/2019         Assessment and Plan   1. Nummular eczematous dermatitis  Right arm only affected makes it unusual but there is no real dermatomal distribution and absolutely no pain or vesicles which make shingles much less likely . The rash is in erythematous crops but with no real central clearing to suggest ringworm . Differential diagnosis includes guttate psoriasis , or pityriases rosecea In addition the rash has been there for 5 days and has passed the onset of antiviral therapy benefit . recommend steroid cream . analysis of what may be new in his household  detergents etc. And to keep us updated . also reassure him that even shingles diagnoses is not infectious unless there is direct contact with vesicles      No Follow-up on file.     History of Present Illness   This 53 y.o. old   Chief Complaint   Patient presents with     Rash     RAsh on right arm and hand, wants to make sure its not shingles, doesn't itch, first noitced couple days ago       Review of Systems: A comprehensive review of systems was negative except as noted.     Medications, Allergies and Problem List   Reviewed, reconciled and updated  Patient Active Problem List   Diagnosis     Obesity     Hyperlipidemia     Diabetes Mellitus     Attention Deficit Disorder Without Hyperactivity     Obesity (BMI 35.0-39.9) with comorbidity (H)     Essential hypertension with goal blood pressure less than 140/90          Physical Exam   General Appearance:       /80 (Patient Site: Right Arm, Patient Position: Sitting, Cuff Size: Adult Large)   Pulse 85   Ht 6' 5\" (1.956 m)   Wt (!) 332 lb (150.6 kg)   SpO2 98%   BMI 39.37 kg/m       General appearance - alert, well appearing, and in no distress    Skin - normal coloration and turgor, no rashes,  3 crops of erythematous macules and papules one in upper mid arm , ne in elbow area and one lower forearm . No rash in " between  No vesicles, no scaling  No central clearing .                                                                                      Additional Information   Current Outpatient Medications   Medication Sig Dispense Refill     aspirin 81 MG EC tablet Take 81 mg by mouth daily.       atorvastatin (LIPITOR) 20 MG tablet Take 1 tablet (20 mg total) by mouth daily. 90 tablet 3     BLOOD-GLUCOSE METER MISC Accu-Chek Augustina Plus device with kit.  Use as directed.       lisinopril (PRINIVIL,ZESTRIL) 10 MG tablet Take 1 tablet (10 mg total) by mouth daily. 90 tablet 3     metFORMIN (GLUCOPHAGE) 1000 MG tablet TAKE 1 TABLET BY MOUTH TWICE DAILY WITH MEALS.. 180 tablet 3     No current facility-administered medications for this visit.      No Known Allergies  Social History     Tobacco Use     Smoking status: Never Smoker     Smokeless tobacco: Never Used   Substance Use Topics     Alcohol use: Yes     Frequency: Monthly or less     Drinks per session: 1 or 2     Comment: occ.     Drug use: No     @PMH      Time: total time spent with the patient was 15 minutes of which >50% was spent in counseling and coordination of care     Brittny Dee MD

## 2021-06-25 NOTE — PROGRESS NOTES
Office Visit - Follow Up   Walter Santa   53 y.o. male    Date of Visit: 3/21/2019    Chief Complaint   Patient presents with     Ear Fullness     right        Assessment and Plan   1. Acute TRINA (middle ear effusion), right  Somewhat subacute, likely related to allergies.  Given his diabetes I prefer not to use systemic steroids.  Given his underlying hypertension I prefer not to use decongestants orally.  I recommended oral antihistamine, topical Flonase and 3 days of Afrin nasal spray.  In the event that this does not resolve his effusion I recommended an ENT evaluation  - fexofenadine (ALLEGRA) 60 MG tablet; Take 1 tablet (60 mg total) by mouth 2 (two) times a day.; Refill: 0  - fluticasone (FLONASE) 50 mcg/actuation nasal spray; 2 sprays into each nostril daily.  Dispense: 10 g; Refill: 11  - oxymetazoline (AFRIN) 0.05 % nasal spray; 2 sprays into each nostril 2 (two) times a day. For 3 days  Dispense: 15 mL; Refill: 2  - Ambulatory referral to ENT    2. Seasonal allergies  As above    3. Essential hypertension with goal blood pressure less than 140/90  Well-controlled continue current medication    4. Diabetes mellitus type 2 in obese (H)  Continue metformin, aspirin, statin, weight loss annual diabetic eye exam and excellent diabetic foot care    5. Obesity (BMI 35.0-39.9) with comorbidity (H)  The following high BMI interventions were performed this visit: encouragement to exercise and lifestyle education regarding diet    Return in about 3 months (around 6/21/2019) for diabetes follow up.     History of Present Illness   This 53 y.o. old man comes in for follow-up.  Overall he is doing okay.  He has been having issues with fullness and decreased hearing in his right ear.  He apparently was seen at an urgent care and had wax removed and was given antibiotic drops.  This is not helped.  He has a flare of allergies currently, not taking any medications for this.  Blood pressure has been controlled.   "Blood sugars have been reasonable and he has attempted to lose weight.  Follow-up in June for diabetic labs.    Review of Systems: A comprehensive review of systems was negative except as noted.     Medications, Allergies and Problem List   Reviewed, reconciled and updated     Physical Exam   General Appearance:   No acute distress    /70 (Patient Site: Left Arm, Patient Position: Sitting, Cuff Size: Adult Regular)   Pulse 86   Ht 6' 5\" (1.956 m)   Wt (!) 328 lb (148.8 kg)   SpO2 98%   BMI 38.90 kg/m      He has a serous effusion of the right middle ear, left middle ear looks okay.  Remainder of HEENT exam is unremarkable cardiovascular regular rate and rhythm no murmur gallop or rub was a clear to auscultation bilaterally, he has no lower extremity edema     Additional Information   Current Outpatient Medications   Medication Sig Dispense Refill     aspirin 81 MG EC tablet Take 81 mg by mouth daily.       atorvastatin (LIPITOR) 20 MG tablet Take 1 tablet (20 mg total) by mouth daily. 90 tablet 3     BLOOD-GLUCOSE METER MISC Accu-Chek Augustina Plus device with kit.  Use as directed.       clobetasol 0.025 % Crea Apply 1 cm topically 2 (two) times a day. Pea sized amount on affected area twice a day 1 Tube 0     lisinopril (PRINIVIL,ZESTRIL) 10 MG tablet Take 1 tablet (10 mg total) by mouth daily. 90 tablet 3     metFORMIN (GLUCOPHAGE) 1000 MG tablet TAKE 1 TABLET BY MOUTH TWICE DAILY WITH MEALS.. 180 tablet 3     fexofenadine (ALLEGRA) 60 MG tablet Take 1 tablet (60 mg total) by mouth 2 (two) times a day.  0     fluticasone (FLONASE) 50 mcg/actuation nasal spray 2 sprays into each nostril daily. 10 g 11     oxymetazoline (AFRIN) 0.05 % nasal spray 2 sprays into each nostril 2 (two) times a day. For 3 days 15 mL 2     No current facility-administered medications for this visit.      No Known Allergies  Social History     Tobacco Use     Smoking status: Never Smoker     Smokeless tobacco: Never Used "   Substance Use Topics     Alcohol use: Yes     Frequency: Monthly or less     Drinks per session: 1 or 2     Comment: occ.     Drug use: No       Review and/or order of clinical lab tests:  Review and/or order of radiology tests:  Review and/or order of medicine tests:  Discussion of test results with performing physician:  Decision to obtain old records and/or obtain history from someone other than the patient:  Review and summarization of old records and/or obtaining history from someone other than the patient and.or discussion of case with another health care provider:  Independent visualization of image, tracing or specimen itself:    Time:      Gerry Ibarra MD

## 2021-09-11 ENCOUNTER — HEALTH MAINTENANCE LETTER (OUTPATIENT)
Age: 56
End: 2021-09-11

## 2021-12-23 DIAGNOSIS — E66.9 DIABETES MELLITUS TYPE 2 IN OBESE: ICD-10-CM

## 2021-12-23 DIAGNOSIS — E11.69 DIABETES MELLITUS TYPE 2 IN OBESE: ICD-10-CM

## 2021-12-23 DIAGNOSIS — I10 ESSENTIAL HYPERTENSION WITH GOAL BLOOD PRESSURE LESS THAN 140/90: Primary | ICD-10-CM

## 2021-12-23 NOTE — TELEPHONE ENCOUNTER
Refill Request  Medication name: Pending Prescriptions:                       Disp   Refills    atorvastatin (LIPITOR) 20 MG tablet                           Sig: Take 1 tablet (20 mg) by mouth daily    lisinopril (ZESTRIL) 5 MG tablet                              Sig: Take 2 tablets (10 mg) by mouth    Who prescribed the medication: unknown  Last refill on medication: 11/20/17  Requested Pharmacy: [facility]  Last appointment with PCP: 3/31/21  Next appointment: Patient due for appointment

## 2021-12-23 NOTE — TELEPHONE ENCOUNTER
atorvastatin (LIPITOR) 20 MG tablet  Is not on the med  List, but has a 10 mg rx noted on med list   Refill Request  Medication name: Pending Prescriptions:                       Disp   Refills    atorvastatin (LIPITOR) 20 MG tablet                           Sig: Take 1 tablet (20 mg) by mouth daily    Who prescribed the medication: unknown  Last refill on medication: 11/20/2017  Requested Pharmacy: [facility]  Last appointment with PCP: 3/31/21  Next appointment: Patient due for appointment

## 2021-12-26 NOTE — TELEPHONE ENCOUNTER
"Routing refill request to provider for review/approval because:  Labs not current:  LDL, creatinine, potassium  Patient needs to be seen because it has been more than 1 year since last office visit.    Last Written Prescription Date:  11/24/2020  Last Fill Quantity: 90,  # refills: 3   Last office visit provider:  12/28/2019 Dr. Ibarra     atorvastatin (LIPITOR) 20 MG tablet 90 tablet 3 11/24/2020  No   Sig - Route: Take 1 tablet (20 mg total) by mouth daily. - Oral   Sent to pharmacy as: atorvastatin 20 mg tablet (LIPITOR)   E-Prescribing Status: Receipt confirmed by pharmacy (11/24/2020 10:16 AM CST         Requested Prescriptions   Pending Prescriptions Disp Refills     atorvastatin (LIPITOR) 20 MG tablet       Sig: Take 1 tablet (20 mg) by mouth daily       Statins Protocol Failed - 12/23/2021 11:52 AM        Failed - LDL on file in past 12 months     Recent Labs   Lab Test 12/04/18  1001   LDL 47             Passed - No abnormal creatine kinase in past 12 months     No lab results found.             Passed - Recent (12 mo) or future (30 days) visit within the authorizing provider's specialty     Patient has had an office visit with the authorizing provider or a provider within the authorizing providers department within the previous 12 mos or has a future within next 30 days. See \"Patient Info\" tab in inbasket, or \"Choose Columns\" in Meds & Orders section of the refill encounter.              Passed - Medication is active on med list        Passed - Patient is age 18 or older           lisinopril (ZESTRIL) 5 MG tablet       Sig: Take 2 tablets (10 mg) by mouth       ACE Inhibitors (Including Combos) Protocol Failed - 12/23/2021 11:52 AM        Failed - Blood pressure under 140/90 in past 12 months     BP Readings from Last 3 Encounters:   03/31/21 (!) 155/83   12/18/19 134/86   09/05/19 132/74                 Failed - Normal serum creatinine on file in past 12 months     Recent Labs   Lab Test 09/05/19  0829 " "  CR 0.93       Ok to refill medication if creatinine is low          Failed - Normal serum potassium on file in past 12 months     Recent Labs   Lab Test 09/05/19  0829   POTASSIUM 4.0             Passed - Recent (12 mo) or future (30 days) visit within the authorizing provider's specialty     Patient has had an office visit with the authorizing provider or a provider within the authorizing providers department within the previous 12 mos or has a future within next 30 days. See \"Patient Info\" tab in inbasket, or \"Choose Columns\" in Meds & Orders section of the refill encounter.              Passed - Medication is active on med list        Passed - Patient is age 18 or older             Mirta Ruiz RN 12/25/21 9:58 PM  "

## 2021-12-27 RX ORDER — ATORVASTATIN CALCIUM 20 MG/1
20 TABLET, FILM COATED ORAL DAILY
Qty: 90 TABLET | Refills: 3 | Status: SHIPPED | OUTPATIENT
Start: 2021-12-27 | End: 2023-01-12

## 2021-12-27 RX ORDER — LISINOPRIL 5 MG/1
10 TABLET ORAL DAILY
Qty: 180 TABLET | Refills: 3 | Status: SHIPPED | OUTPATIENT
Start: 2021-12-27 | End: 2023-01-12

## 2022-01-05 ENCOUNTER — HOSPITAL ENCOUNTER (EMERGENCY)
Facility: CLINIC | Age: 57
Discharge: HOME | End: 2022-01-05
Attending: EMERGENCY MEDICINE | Admitting: EMERGENCY MEDICINE
Payer: COMMERCIAL

## 2022-01-05 ENCOUNTER — APPOINTMENT (OUTPATIENT)
Dept: CT IMAGING | Facility: CLINIC | Age: 57
End: 2022-01-05
Attending: EMERGENCY MEDICINE
Payer: COMMERCIAL

## 2022-01-05 VITALS
OXYGEN SATURATION: 99 % | HEIGHT: 77 IN | BODY MASS INDEX: 36.6 KG/M2 | DIASTOLIC BLOOD PRESSURE: 92 MMHG | SYSTOLIC BLOOD PRESSURE: 130 MMHG | RESPIRATION RATE: 16 BRPM | HEART RATE: 85 BPM | TEMPERATURE: 96.9 F | WEIGHT: 310 LBS

## 2022-01-05 DIAGNOSIS — N23 RENAL COLIC: ICD-10-CM

## 2022-01-05 LAB
ALBUMIN UR-MCNC: 20 MG/DL
ANION GAP SERPL CALCULATED.3IONS-SCNC: 6 MMOL/L (ref 3–14)
APPEARANCE UR: CLEAR
ATRIAL RATE - MUSE: 83 BPM
BASOPHILS # BLD AUTO: 0.1 10E3/UL (ref 0–0.2)
BASOPHILS NFR BLD AUTO: 1 %
BILIRUB UR QL STRIP: NEGATIVE
BUN SERPL-MCNC: 19 MG/DL (ref 7–30)
CALCIUM SERPL-MCNC: 9 MG/DL (ref 8.5–10.1)
CHLORIDE BLD-SCNC: 105 MMOL/L (ref 94–109)
CO2 SERPL-SCNC: 27 MMOL/L (ref 20–32)
COLOR UR AUTO: YELLOW
CREAT SERPL-MCNC: 1.33 MG/DL (ref 0.66–1.25)
DIASTOLIC BLOOD PRESSURE - MUSE: NORMAL MMHG
EOSINOPHIL # BLD AUTO: 0 10E3/UL (ref 0–0.7)
EOSINOPHIL NFR BLD AUTO: 0 %
ERYTHROCYTE [DISTWIDTH] IN BLOOD BY AUTOMATED COUNT: 12.4 % (ref 10–15)
GFR SERPL CREATININE-BSD FRML MDRD: 62 ML/MIN/1.73M2
GLUCOSE BLD-MCNC: 227 MG/DL (ref 70–99)
GLUCOSE UR STRIP-MCNC: 1000 MG/DL
HCT VFR BLD AUTO: 44.2 % (ref 40–53)
HGB BLD-MCNC: 15 G/DL (ref 13.3–17.7)
HGB UR QL STRIP: ABNORMAL
IMM GRANULOCYTES # BLD: 0 10E3/UL
IMM GRANULOCYTES NFR BLD: 0 %
INTERPRETATION ECG - MUSE: NORMAL
KETONES UR STRIP-MCNC: 40 MG/DL
LEUKOCYTE ESTERASE UR QL STRIP: NEGATIVE
LYMPHOCYTES # BLD AUTO: 0.8 10E3/UL (ref 0.8–5.3)
LYMPHOCYTES NFR BLD AUTO: 7 %
MCH RBC QN AUTO: 30.7 PG (ref 26.5–33)
MCHC RBC AUTO-ENTMCNC: 33.9 G/DL (ref 31.5–36.5)
MCV RBC AUTO: 90 FL (ref 78–100)
MONOCYTES # BLD AUTO: 0.5 10E3/UL (ref 0–1.3)
MONOCYTES NFR BLD AUTO: 5 %
MUCOUS THREADS #/AREA URNS LPF: PRESENT /LPF
NEUTROPHILS # BLD AUTO: 9.3 10E3/UL (ref 1.6–8.3)
NEUTROPHILS NFR BLD AUTO: 87 %
NITRATE UR QL: NEGATIVE
NRBC # BLD AUTO: 0 10E3/UL
NRBC BLD AUTO-RTO: 0 /100
P AXIS - MUSE: 27 DEGREES
PH UR STRIP: 5.5 [PH] (ref 5–7)
PLATELET # BLD AUTO: 178 10E3/UL (ref 150–450)
POTASSIUM BLD-SCNC: 4.1 MMOL/L (ref 3.4–5.3)
PR INTERVAL - MUSE: 154 MS
QRS DURATION - MUSE: 110 MS
QT - MUSE: 380 MS
QTC - MUSE: 446 MS
R AXIS - MUSE: -6 DEGREES
RBC # BLD AUTO: 4.89 10E6/UL (ref 4.4–5.9)
RBC URINE: 2 /HPF
SODIUM SERPL-SCNC: 138 MMOL/L (ref 133–144)
SP GR UR STRIP: 1.02 (ref 1–1.03)
SYSTOLIC BLOOD PRESSURE - MUSE: NORMAL MMHG
T AXIS - MUSE: 17 DEGREES
UROBILINOGEN UR STRIP-MCNC: NORMAL MG/DL
VENTRICULAR RATE- MUSE: 83 BPM
WBC # BLD AUTO: 10.7 10E3/UL (ref 4–11)
WBC URINE: 1 /HPF

## 2022-01-05 PROCEDURE — 85025 COMPLETE CBC W/AUTO DIFF WBC: CPT | Performed by: EMERGENCY MEDICINE

## 2022-01-05 PROCEDURE — 96374 THER/PROPH/DIAG INJ IV PUSH: CPT | Mod: 59

## 2022-01-05 PROCEDURE — 250N000011 HC RX IP 250 OP 636: Performed by: EMERGENCY MEDICINE

## 2022-01-05 PROCEDURE — 80048 BASIC METABOLIC PNL TOTAL CA: CPT | Performed by: EMERGENCY MEDICINE

## 2022-01-05 PROCEDURE — 74177 CT ABD & PELVIS W/CONTRAST: CPT

## 2022-01-05 PROCEDURE — 93005 ELECTROCARDIOGRAM TRACING: CPT

## 2022-01-05 PROCEDURE — 36415 COLL VENOUS BLD VENIPUNCTURE: CPT | Performed by: EMERGENCY MEDICINE

## 2022-01-05 PROCEDURE — 250N000009 HC RX 250: Performed by: EMERGENCY MEDICINE

## 2022-01-05 PROCEDURE — 99285 EMERGENCY DEPT VISIT HI MDM: CPT | Mod: 25

## 2022-01-05 PROCEDURE — 81001 URINALYSIS AUTO W/SCOPE: CPT | Performed by: EMERGENCY MEDICINE

## 2022-01-05 PROCEDURE — 96375 TX/PRO/DX INJ NEW DRUG ADDON: CPT

## 2022-01-05 RX ORDER — TAMSULOSIN HYDROCHLORIDE 0.4 MG/1
0.4 CAPSULE ORAL DAILY
Qty: 10 CAPSULE | Refills: 0 | Status: SHIPPED | OUTPATIENT
Start: 2022-01-05 | End: 2022-01-15

## 2022-01-05 RX ORDER — IOPAMIDOL 755 MG/ML
135 INJECTION, SOLUTION INTRAVASCULAR ONCE
Status: COMPLETED | OUTPATIENT
Start: 2022-01-05 | End: 2022-01-05

## 2022-01-05 RX ORDER — HYDROMORPHONE HYDROCHLORIDE 1 MG/ML
0.5 INJECTION, SOLUTION INTRAMUSCULAR; INTRAVENOUS; SUBCUTANEOUS
Status: DISCONTINUED | OUTPATIENT
Start: 2022-01-05 | End: 2022-01-05 | Stop reason: HOSPADM

## 2022-01-05 RX ORDER — OXYCODONE AND ACETAMINOPHEN 5; 325 MG/1; MG/1
1-2 TABLET ORAL EVERY 4 HOURS PRN
Qty: 12 TABLET | Refills: 0 | Status: SHIPPED | OUTPATIENT
Start: 2022-01-05 | End: 2022-02-03

## 2022-01-05 RX ORDER — KETOROLAC TROMETHAMINE 15 MG/ML
10 INJECTION, SOLUTION INTRAMUSCULAR; INTRAVENOUS ONCE
Status: COMPLETED | OUTPATIENT
Start: 2022-01-05 | End: 2022-01-05

## 2022-01-05 RX ADMIN — KETOROLAC TROMETHAMINE 10 MG: 15 INJECTION, SOLUTION INTRAMUSCULAR; INTRAVENOUS at 07:28

## 2022-01-05 RX ADMIN — SODIUM CHLORIDE 79 ML: 900 INJECTION INTRAVENOUS at 07:44

## 2022-01-05 RX ADMIN — IOPAMIDOL 135 ML: 755 INJECTION, SOLUTION INTRAVENOUS at 07:44

## 2022-01-05 RX ADMIN — HYDROMORPHONE HYDROCHLORIDE 0.5 MG: 1 INJECTION, SOLUTION INTRAMUSCULAR; INTRAVENOUS; SUBCUTANEOUS at 08:28

## 2022-01-05 ASSESSMENT — ENCOUNTER SYMPTOMS
DIARRHEA: 0
DIAPHORESIS: 1
CONSTIPATION: 0
FLANK PAIN: 1
BLOOD IN STOOL: 0
ABDOMINAL PAIN: 1
DYSURIA: 0
FREQUENCY: 1

## 2022-01-05 ASSESSMENT — MIFFLIN-ST. JEOR: SCORE: 2343.53

## 2022-01-05 NOTE — ED TRIAGE NOTES
Patient came in due to abdominal pain starting about a week ago until last night Pain started around midnight. Pain is on the left side and radiates down to the hip.

## 2022-01-05 NOTE — ED PROVIDER NOTES
"  History   Chief Complaint:  Abdominal Pain       The history is provided by the patient and medical records.      Walter Santa is a 56 year old male with history of type 2 diabetes, obesity, hypertension who presents with abdominal pain. The patient reports that he began experiencing left-sided abdominal pain about 6 days ago, which worsened 7 hours ago. His pain radiates to his left flank \"a little,\" but not to his testicles or back. He describes his pain as continual, not sharp, and associated with intermittent diaphoresis. The patient also notes that he has urinated 3 times this morning. He has not taken any medications for his pain at home. The patient reports a history of type 2 diabetes, but no kidney issues or past abdominal surgeries. He is not a smoker. He endorses drinking alcohol \"very sporadically.\" He states that he last ate about 14 hours ago. Per [facility], the patient is triple-vaccinated against Covid-19. He reports no dysuria or bowel symptoms.     Review of Systems   Constitutional: Positive for diaphoresis.   Gastrointestinal: Positive for abdominal pain (L). Negative for blood in stool, constipation and diarrhea.   Genitourinary: Positive for flank pain (L) and frequency. Negative for dysuria.   All other systems reviewed and are negative.    Allergies:  No Known Drug Allergies    Medications:  Aspirin  Atorvastatin  Lisinopril  Metformin    Past Medical History:     ADD  Hyperlipidemia  Hypertension  Obesity  Type 2 diabetes      Past Surgical History:    Right knee arthroscopy  Left palmar fasciectomy  Right shoulder surgery x3  Tonsillectomy     Family History:    Father: Alcohol abuse, atrial fibrillation, hypertension, tongue cancer  Mother: Hypertension, brain aneurysm  Sister: Kidney cancer    Social History:  Presents to the emergency department alone  Arrives via car  Lives with his wife  Works as a  for [employer]    Physical Exam     Patient Vitals for the past 24 " "hrs:   BP Temp Temp src Pulse Resp SpO2 Height Weight   01/05/22 0937 (!) 130/92 -- -- 85 16 99 % -- --   01/05/22 0900 -- -- -- -- 16 97 % -- --   01/05/22 0840 -- -- -- 87 16 98 % -- --   01/05/22 0732 (!) 161/87 -- -- 78 16 99 % -- --   01/05/22 0613 (!) 172/83 96.9  F (36.1  C) Oral 78 18 99 % 1.956 m (6' 5\") 140.6 kg (310 lb)       Physical Exam  Constitutional: Large white male. Supine. No respiratory distress.  HENT: No signs of trauma.   Eyes: EOM are normal. Pupils are equal, round, and reactive to light.   Neck: Normal range of motion. No JVD present. No cervical adenopathy.  Cardiovascular: Regular rhythm.  Exam reveals no gallop and no friction rub.    No murmur heard.  Pulmonary/Chest: Bilateral breath sounds normal. No wheezes, rhonchi or rales.  Abdominal: Soft. Tenderness to the left upper mid abdomen. No rebound. 2+ femoral pulses. Left CVA tenderness  Musculoskeletal: No edema. No tenderness.   Lymphadenopathy: No lymphadenopathy.   Neurological: Alert and oriented to person, place, and time. Normal strength. Coordination normal.   Skin: Skin is warm and dry. No rash noted. No erythema.       Emergency Department Course   ECG  ECG obtained at 0523, ECG read at 0931  Sinus rhythm  Incomplete right bundle branch block   Rate 83 bpm. ME interval 154 ms. QRS duration 110 ms. QT/QTc 380/446 ms. P-R-T axes 27 -6 17.     Imaging:  CT Abdomen Pelvis w Contrast   Final Result   IMPRESSION: 5 mm calculus at the left ureterovesical junction causes   mild left hydronephrosis and hydroureter with a delayed nephrogram and   perinephric infiltration.      LIANG WILSON MD            SYSTEM ID:  [id number]        Report per radiology    Laboratory:  Labs Ordered and Resulted from Time of ED Arrival to Time of ED Departure   BASIC METABOLIC PANEL - Abnormal       Result Value    Sodium 138      Potassium 4.1      Chloride 105      Carbon Dioxide (CO2) 27      Anion Gap 6      Urea Nitrogen 19      Creatinine 1.33 " (*)     Calcium 9.0      Glucose 227 (*)     GFR Estimate 62     CBC WITH PLATELETS AND DIFFERENTIAL - Abnormal    WBC Count 10.7      RBC Count 4.89      Hemoglobin 15.0      Hematocrit 44.2      MCV 90      MCH 30.7      MCHC 33.9      RDW 12.4      Platelet Count 178      % Neutrophils 87      % Lymphocytes 7      % Monocytes 5      % Eosinophils 0      % Basophils 1      % Immature Granulocytes 0      NRBCs per 100 WBC 0      Absolute Neutrophils 9.3 (*)     Absolute Lymphocytes 0.8      Absolute Monocytes 0.5      Absolute Eosinophils 0.0      Absolute Basophils 0.1      Absolute Immature Granulocytes 0.0      Absolute NRBCs 0.0     ROUTINE UA WITH MICROSCOPIC REFLEX TO CULTURE - Abnormal    Color Urine Yellow      Appearance Urine Clear      Glucose Urine 1000  (*)     Bilirubin Urine Negative      Ketones Urine 40  (*)     Specific Gravity Urine 1.020      Blood Urine Trace (*)     pH Urine 5.5      Protein Albumin Urine 20  (*)     Urobilinogen Urine Normal      Nitrite Urine Negative      Leukocyte Esterase Urine Negative      Mucus Urine Present (*)     RBC Urine 2      WBC Urine 1          Emergency Department Course:  Reviewed:  I reviewed nursing notes, vitals, past medical history and Care Everywhere    Assessments:  0716 I obtained history and examined the patient as noted above.   0825 I rechecked the patient  0922 I rechecked the patient and explained findings.    Interventions:  0728 Toradol 10 mg IV  0828 Dilaudid 0.5 mg IV    Disposition:  The patient was discharged to home.     Impression & Plan     Medical Decision Making:  Walter Santa is a 56 year old male who presents with 5 days of intermittent, left-sided abdominal pain. The patient states that it has been particularly bad since last night. He has no nausea, no fevers, no chills, no urinary or bowel issues. He has not had pain like this before. He denies any trauma. On exam, he does have tenderness in the left side of his abdomen, in  the left CVA region. Labs were obtained, as well as blood and urine. CT shows a 5 mm stone at the left UVJ, with proximal hydro. Patient received Toradol and Dilaudid and he is feeling much better. He feels comfortable being discharged. I will provide him with Percocet and Flomax. I warned him not to drive or operate machinery with the Percocet. Use stool softeners. He should strain his urine. If symptoms remain, I have recommended follow-up with urology and referred him to Dr. Mayes. If he has persistent vomiting, fevers, or pain he cannot control, return to the ED. I have also recommended he stay on ibuprofen 3-4 times a day to help with spasm.    Diagnosis:    ICD-10-CM    1. Renal colic  N23        Discharge Medications:  New Prescriptions    OXYCODONE-ACETAMINOPHEN (PERCOCET) 5-325 MG TABLET    Take 1-2 tablets by mouth every 4 hours as needed for pain    TAMSULOSIN (FLOMAX) 0.4 MG CAPSULE    Take 1 capsule (0.4 mg) by mouth daily for 10 doses       Scribe Disclosure:  I, Abebe Sheehan, am serving as a scribe at 7:12 AM on 1/5/2022 to document services personally performed by Dirk Hair MD based on my observations and the provider's statements to me.              Dirk Hair MD  01/05/22 8180

## 2022-01-18 ENCOUNTER — TRANSFERRED RECORDS (OUTPATIENT)
Dept: HEALTH INFORMATION MANAGEMENT | Facility: CLINIC | Age: 57
End: 2022-01-18
Payer: COMMERCIAL

## 2022-01-28 ENCOUNTER — TRANSFERRED RECORDS (OUTPATIENT)
Dept: HEALTH INFORMATION MANAGEMENT | Facility: CLINIC | Age: 57
End: 2022-01-28
Payer: COMMERCIAL

## 2022-02-03 ENCOUNTER — OFFICE VISIT (OUTPATIENT)
Dept: INTERNAL MEDICINE | Facility: CLINIC | Age: 57
End: 2022-02-03
Payer: COMMERCIAL

## 2022-02-03 VITALS
HEIGHT: 77 IN | BODY MASS INDEX: 37.19 KG/M2 | HEART RATE: 86 BPM | DIASTOLIC BLOOD PRESSURE: 89 MMHG | SYSTOLIC BLOOD PRESSURE: 129 MMHG | WEIGHT: 315 LBS | OXYGEN SATURATION: 99 %

## 2022-02-03 DIAGNOSIS — E66.9 DIABETES MELLITUS TYPE 2 IN OBESE: ICD-10-CM

## 2022-02-03 DIAGNOSIS — E78.5 HYPERLIPIDEMIA LDL GOAL <100: ICD-10-CM

## 2022-02-03 DIAGNOSIS — I10 ESSENTIAL HYPERTENSION WITH GOAL BLOOD PRESSURE LESS THAN 140/90: ICD-10-CM

## 2022-02-03 DIAGNOSIS — S83.241D TEAR OF MEDIAL MENISCUS OF RIGHT KNEE, CURRENT, UNSPECIFIED TEAR TYPE, SUBSEQUENT ENCOUNTER: ICD-10-CM

## 2022-02-03 DIAGNOSIS — E66.01 MORBID OBESITY (H): ICD-10-CM

## 2022-02-03 DIAGNOSIS — R94.31 ABNORMAL ELECTROCARDIOGRAM: ICD-10-CM

## 2022-02-03 DIAGNOSIS — E11.69 DIABETES MELLITUS TYPE 2 IN OBESE: ICD-10-CM

## 2022-02-03 DIAGNOSIS — Z01.818 PREOP GENERAL PHYSICAL EXAM: Primary | ICD-10-CM

## 2022-02-03 LAB
ALBUMIN SERPL-MCNC: 4.3 G/DL (ref 3.5–5)
ALP SERPL-CCNC: 69 U/L (ref 45–120)
ALT SERPL W P-5'-P-CCNC: 23 U/L (ref 0–45)
ANION GAP SERPL CALCULATED.3IONS-SCNC: 11 MMOL/L (ref 5–18)
AST SERPL W P-5'-P-CCNC: 15 U/L (ref 0–40)
BILIRUB SERPL-MCNC: 0.6 MG/DL (ref 0–1)
BUN SERPL-MCNC: 16 MG/DL (ref 8–22)
CALCIUM SERPL-MCNC: 9.9 MG/DL (ref 8.5–10.5)
CHLORIDE BLD-SCNC: 104 MMOL/L (ref 98–107)
CO2 SERPL-SCNC: 25 MMOL/L (ref 22–31)
CREAT SERPL-MCNC: 0.9 MG/DL (ref 0.7–1.3)
ERYTHROCYTE [DISTWIDTH] IN BLOOD BY AUTOMATED COUNT: 12.2 % (ref 10–15)
GFR SERPL CREATININE-BSD FRML MDRD: >90 ML/MIN/1.73M2
GLUCOSE BLD-MCNC: 123 MG/DL (ref 70–125)
HBA1C MFR BLD: 7.7 % (ref 0–5.6)
HCT VFR BLD AUTO: 42.8 % (ref 40–53)
HGB BLD-MCNC: 14.8 G/DL (ref 13.3–17.7)
MCH RBC QN AUTO: 30.3 PG (ref 26.5–33)
MCHC RBC AUTO-ENTMCNC: 34.6 G/DL (ref 31.5–36.5)
MCV RBC AUTO: 88 FL (ref 78–100)
PLATELET # BLD AUTO: 182 10E3/UL (ref 150–450)
POTASSIUM BLD-SCNC: 4.5 MMOL/L (ref 3.5–5)
PROT SERPL-MCNC: 7.2 G/DL (ref 6–8)
RBC # BLD AUTO: 4.88 10E6/UL (ref 4.4–5.9)
SODIUM SERPL-SCNC: 140 MMOL/L (ref 136–145)
TSH SERPL DL<=0.005 MIU/L-ACNC: 0.82 UIU/ML (ref 0.3–5)
WBC # BLD AUTO: 6.7 10E3/UL (ref 4–11)

## 2022-02-03 PROCEDURE — 84443 ASSAY THYROID STIM HORMONE: CPT | Performed by: INTERNAL MEDICINE

## 2022-02-03 PROCEDURE — 80053 COMPREHEN METABOLIC PANEL: CPT | Performed by: INTERNAL MEDICINE

## 2022-02-03 PROCEDURE — 36415 COLL VENOUS BLD VENIPUNCTURE: CPT | Performed by: INTERNAL MEDICINE

## 2022-02-03 PROCEDURE — 99215 OFFICE O/P EST HI 40 MIN: CPT | Performed by: INTERNAL MEDICINE

## 2022-02-03 PROCEDURE — 85027 COMPLETE CBC AUTOMATED: CPT | Performed by: INTERNAL MEDICINE

## 2022-02-03 PROCEDURE — 83036 HEMOGLOBIN GLYCOSYLATED A1C: CPT | Performed by: INTERNAL MEDICINE

## 2022-02-03 ASSESSMENT — MIFFLIN-ST. JEOR: SCORE: 2367.12

## 2022-02-03 NOTE — PATIENT INSTRUCTIONS
Satisfactory preoperative medical exam in anticipation of arthroscopic right knee surgery to repair torn meniscus, scheduled for 2/10/2022 to be done at [facility] in [place]    Walter is generally feeling well these days. No cardiac symptoms whatsoever. He does have a history of an abnormal electrocardiogram most recently taken 1/5/2022 (during emergency department visit for kidney stone), but this was unchanged from December 2019, and shows anterior infarct pattern. Walter is not exhibiting any symptoms of angina or heart failure.    He has a history of similar arthroscopic knee surgery done on his left knee in approximately 2013, no complications. He tolerates anesthesia fine, no history of any bleeding or clotting problems.    For laboratory testing today will get CBC, comprehensive metabolic panel, A1c, thyroid cascade. No changes to his medications for now. He no longer takes baby aspirin. On lisinopril, metformin, and atorvastatin.    I told him that the evening before surgery, skip Metformin    It is been his routine to take all of his medications in the morning.  I told him that on the morning of surgery since he will be n.p.o., he can skip his medications that morning.    He is also establishing care with me, and I want him to come back to see me in about 3 months, come fasting that day, so that we can check lipid profile.      Type 2 diabetes, has been on long-term metformin. We'll check an A1c along with his other preoperative lab test.  He told me he is due for an eye exam, he goes to [facility]    Hyperlipidemia, on atorvastatin, no history of cardiovascular events    Obesity with body mass index of 37, hopefully after he gets his knee fixed, he'll be able to be even more physically active, but of course also needs to work on dietary discipline.    Kidney stone, passed 1-7-2022 1-5-2022  CT Abdomen Pelvis w Contrast  IMPRESSION: 5 mm calculus at the left  ureterovesical junction causes mild left hydronephrosis and hydroureter with a delayed nephrogram and perinephric infiltration.    Fully vaccinated and boosted for COVID-19, received his third shot of Pfizer October 2021. [facility] told him to bring his vaccination card with him, and apparently they're not requiring him to be tested.      Possible Sleep Apnea: He says he does have some nighttime awakenings, but then falls back to sleep, and reports good energy level during the day.    RECOMMENDATION:  APPROVAL GIVEN to proceed with proposed procedure, without further diagnostic evaluation.

## 2022-02-03 NOTE — PROGRESS NOTES
[facility]  [address] [address]  Phone: [phone]  Fax: [phone]  Primary Provider: Gerry bIarra  Pre-op Performing Provider: TRISTON CALVIN      PREOPERATIVE EVALUATION:  Today's date: 2/3/2022    Walter Santa is a 56 year old male who presents for a preoperative evaluation.    Surgical Information:  Surgery/Procedure: R Knee Surgery, meniscus tear, arthroscope  Surgery Location: [facility]  Surgeon:   Surgery Date: 02/10/2022  Time of Surgery: Unknown  Where patient plans to recover: At home with family  Fax number for surgical facility: [phone]    Type of Anesthesia Anticipated: General    Assessment & Plan     The proposed surgical procedure is considered INTERMEDIATE risk.    Satisfactory preoperative medical exam in anticipation of arthroscopic right knee surgery to repair torn meniscus, scheduled for 2/10/2022 to be done at [facility] in [place]    Walter is generally feeling well these days. No cardiac symptoms whatsoever. He does have a history of an abnormal electrocardiogram most recently taken 1/5/2022 (during emergency department visit for kidney stone), but this was unchanged from December 2019, and shows anterior infarct pattern. Walter is not exhibiting any symptoms of angina or heart failure.    He has a history of similar arthroscopic knee surgery done on his left knee in approximately 2013, no complications. He tolerates anesthesia fine, no history of any bleeding or clotting problems.    For laboratory testing today will get CBC, comprehensive metabolic panel, A1c, thyroid cascade. No changes to his medications for now. He no longer takes baby aspirin. On lisinopril, metformin, and atorvastatin.    LATER: CBC, comprehensive metabolic panel, TSH normal.  A1c 7.7 suboptimal, but I do not think that would preclude his getting knee surgery.  I sent him a MyChart  Message advising that he should stay on Metformin therapy, but intensify dietary and other lifestyle management.    I told him that the evening before surgery, skip Metformin    It is been his routine to take all of his medications in the morning.  I told him that on the morning of surgery since he will be n.p.o., he can skip his medications that morning.    He is also establishing care with me, and I want him to come back to see me in about 3 months, come fasting that day, so that we can check lipid profile.    Type 2 diabetes, has been on long-term metformin. We'll check an A1c along with his other preoperative lab test.  He told me he is due for an eye exam, he goes to [facility]  A1c 7.7 measured February 3, 2022    Hyperlipidemia, on atorvastatin, no history of cardiovascular events    Obesity with body mass index of 37, hopefully after he gets his knee fixed, he'll be able to be even more physically active, but of course also needs to work on dietary discipline.    Kidney stone, passed 1-7-2022 1-5-2022  CT Abdomen Pelvis w Contrast  IMPRESSION: 5 mm calculus at the left ureterovesical junction causes mild left hydronephrosis and hydroureter with a delayed nephrogram and perinephric infiltration.    Fully vaccinated and boosted for COVID-19, received his third shot of Pfizer October 2021. [facility] told him to bring his vaccination card with him, and apparently they're not requiring him to be tested.    Possible Sleep Apnea: He says he does have some nighttime awakenings, but then falls back to sleep, and reports good energy level during the day.    RECOMMENDATION:  APPROVAL GIVEN to proceed with proposed procedure, without further diagnostic evaluation.      Subjective     HPI related to upcoming procedure:     56-year-old man, works for [employer]      Right knee torn medial meniscus  Right knee has been more painful since beginning  2022, affects mobility  History meniscus repair left side approx 2014  No history any problems with anaesthesia, or any bleeding or clotting problems    Type 2 diabetes  metFORMIN (GLUCOPHAGE) 1000 MG tablet TAKE 1 TABLET BY MOUTH TWICE DAILY WITH MEALS..  9-5-2019  Hemoglobin A1C 3.5 - 6.0 % 7.4     Obesity  Wt Readings from Last 5 Encounters:   02/03/22 143 kg (315 lb 3.2 oz)   01/05/22 140.6 kg (310 lb)   03/31/21 (!) 151.5 kg (334 lb)   12/18/19 149.7 kg (330 lb)   09/05/19 149.7 kg (330 lb)     Hypertension   lisinopril (PRINIVIL,ZESTRIL) 10 MG tablet   Take 1 tablet (10 mg total) by mouth daily. 90     Abnormal ECG, walks 10K steps a day  Non-smoker  No history cardiac events    1-5-2022  Sinus rhythm   Inferior infarct , age undetermined   Cannot rule out Anterior infarct , age undetermined   Abnormal ECG   When compared with ECG of 18-DEC-2019 11:41,   No significant change was found   Confirmed by GENERATED REPORT, COMPUTER (),  Aasen, Bradley ([id number]) on 1/5/2022 2:27:25 PM     Hyperlipidemia  atorvastatin (LIPITOR) 20 MG tablet   Take 1 tablet (20 mg total) by mouth daily.    Kidney stone, passed 1-7-2022 1-5-2022  CT Abdomen Pelvis w Contrast  IMPRESSION: 5 mm calculus at the left ureterovesical junction causes mild left hydronephrosis and hydroureter with a delayed nephrogram and perinephric infiltration.    Suspected sleep apnea    Colonoscopy 8/1/2013 was normal, recheck 10 years would correspond to 2023    He recalls COVID booster (shot #3) 10-  Immunization History   Administered Date(s) Administered     Influenza Vaccine, 6+MO IM (QUADRIVALENT W/PRESERVATIVES) 09/22/2009     Pneumococcal 23 valent 03/10/2009     TD (ADULT, 7+) 11/20/2017     Td,adult,historic,unspecified 01/06/2000     Tdap (Adacel,Boostrix) 03/10/2008         Preop Questions 2/3/2022   1. Have you ever had a heart attack or stroke? No   2. Have you ever had surgery on your heart or blood vessels, such as a stent  placement, a coronary artery bypass, or surgery on an artery in your head, neck, heart, or legs? No   3. Do you have chest pain with activity? No   4. Do you have a history of  heart failure? No   5. Do you currently have a cold, bronchitis or symptoms of other infection? No   6. Do you have a cough, shortness of breath, or wheezing? No   7. Do you or anyone in your family have previous history of blood clots? No   8. Do you or does anyone in your family have a serious bleeding problem such as prolonged bleeding following surgeries or cuts? No   9. Have you ever had problems with anemia or been told to take iron pills? No   10. Have you had any abnormal blood loss such as black, tarry or bloody stools? No   11. Have you ever had a blood transfusion? No   12. Are you willing to have a blood transfusion if it is medically needed before, during, or after your surgery? Yes   13. Have you or any of your relatives ever had problems with anesthesia? No   14. Do you have sleep apnea, excessive snoring or daytime drowsiness? UNKNOWN -possible obstructive sleep apnea   15. Do you have any artifical heart valves or other implanted medical devices like a pacemaker, defibrillator, or continuous glucose monitor? No   16. Do you have artificial joints? No   17. Are you allergic to latex? No       Review of Systems  CONSTITUTIONAL: NEGATIVE for fever, chills, change in weight  INTEGUMENTARY/SKIN: NEGATIVE for worrisome rashes, moles or lesions  EYES: NEGATIVE for vision changes or irritation  ENT/MOUTH: NEGATIVE for ear, mouth and throat problems  RESP: NEGATIVE for significant cough or SOB  CV: NEGATIVE for chest pain, palpitations or peripheral edema  GI: NEGATIVE for nausea, abdominal pain, heartburn, or change in bowel habits  : NEGATIVE for frequency, dysuria, or hematuria  MUSCULOSKELETAL: NEGATIVE for significant arthralgias or myalgia  NEURO: NEGATIVE for weakness, dizziness or paresthesias  ENDOCRINE: NEGATIVE for  "temperature intolerance, skin/hair changes  HEME: NEGATIVE for bleeding problems  PSYCHIATRIC: NEGATIVE for changes in mood or affect    Patient Active Problem List    Diagnosis Date Noted     Diabetes mellitus type 2 in obese (H)      Priority: Medium     Created by [employer] Annotation: Mar 10 2009  2:03PM - Génesis Spears: 2007--had   fasting glucose of 149 mg/dL at [facility]".  a1c was 7.4         Obesity (BMI 35.0-39.9) with comorbidity (H) 12/04/2018     Priority: Medium     Essential hypertension with goal blood pressure less than 140/90 12/04/2018     Priority: Medium     Attention Deficit Disorder Without Hyperactivity      Priority: Medium     Created by Conversion  Replacement Utility updated for latest IMO load         Hyperlipidemia      Priority: Medium     Created by [employer] [facility] Annotation: Apr  3 2009  2:50PM - Génesis Spears: had LDL of   120   with DM2 in 2007          No past medical history on file.  Past Surgical History:   Procedure Laterality Date     ARTHROSCOPY KNEE Right 09/2011     PALMAR FASCIECTOMY Left 03/09/2016    Release left middle and small fingers, Dr. Mino Soto     SHOULDER SURGERY Right     3 surgeries of R shoulder, bursa problem. has residual weakness in elevation of shoulder.      TONSILLECTOMY       Current Outpatient Medications   Medication Sig Dispense Refill     atorvastatin (LIPITOR) 20 MG tablet Take 1 tablet (20 mg) by mouth daily 90 tablet 3     lisinopril (ZESTRIL) 5 MG tablet Take 2 tablets (10 mg) by mouth daily 180 tablet 3     metFORMIN (GLUCOPHAGE) 1000 MG tablet TAKE 1 TABLET BY MOUTH TWICE DAILY WITH MEALS.         No Known Allergies     Social History     Tobacco Use     Smoking status: Never Smoker     Smokeless tobacco: Never Used   Substance Use Topics     Alcohol use: Not on file       History   Drug Use Not on file         Objective     /89   Pulse 86   Ht 1.956 m (6' 5\")   Wt 143 kg (315 lb 3.2 oz)   SpO2 99%   " BMI 37.38 kg/m      Physical Exam     General: Alert, in no distress  Overweight  Skin: No significant lesion seen.  Eyes/nose/throat: Eyes without scleral icterus, eye movements normal, pupils equal and reactive, oropharynx clear, ears with normal TM's  MSK: Neck with good ROM  Lymphatic: Neck without adenopathy or masses  Endocrine: Thyroid with no nodules to palpation  Pulm: Lungs clear to auscultation bilaterally  Cardiac: Heart with regular rate and rhythm, no murmur or gallop  GI: Abdomen obese,  soft, nontender. No palpable enlargement of liver or spleen  MSK: Extremities no tenderness or edema  Bony hypertrophy right knee  Neuro: Moves all extremities, without focal weakness  Psych: Alert, normal mental status. Normal affect and speech      Recent Labs   Lab Test 01/05/22  0518   HGB 15.0         POTASSIUM 4.1   CR 1.33*        Diagnostics:  Recent Results (from the past 48 hour(s))   CBC with platelets    Collection Time: 02/03/22  8:39 AM   Result Value Ref Range    WBC Count 6.7 4.0 - 11.0 10e3/uL    RBC Count 4.88 4.40 - 5.90 10e6/uL    Hemoglobin 14.8 13.3 - 17.7 g/dL    Hematocrit 42.8 40.0 - 53.0 %    MCV 88 78 - 100 fL    MCH 30.3 26.5 - 33.0 pg    MCHC 34.6 31.5 - 36.5 g/dL    RDW 12.2 10.0 - 15.0 %    Platelet Count 182 150 - 450 10e3/uL   Comprehensive metabolic panel (BMP + Alb, Alk Phos, ALT, AST, Total. Bili, TP)    Collection Time: 02/03/22  8:39 AM   Result Value Ref Range    Sodium 140 136 - 145 mmol/L    Potassium 4.5 3.5 - 5.0 mmol/L    Chloride 104 98 - 107 mmol/L    Carbon Dioxide (CO2) 25 22 - 31 mmol/L    Anion Gap 11 5 - 18 mmol/L    Urea Nitrogen 16 8 - 22 mg/dL    Creatinine 0.90 0.70 - 1.30 mg/dL    Calcium 9.9 8.5 - 10.5 mg/dL    Glucose 123 70 - 125 mg/dL    Alkaline Phosphatase 69 45 - 120 U/L    AST 15 0 - 40 U/L    ALT 23 0 - 45 U/L    Protein Total 7.2 6.0 - 8.0 g/dL    Albumin 4.3 3.5 - 5.0 g/dL    Bilirubin Total 0.6 0.0 - 1.0 mg/dL    GFR Estimate >90 >60  mL/min/1.73m2   Hemoglobin A1c    Collection Time: 02/03/22  8:39 AM   Result Value Ref Range    Hemoglobin A1C 7.7 (H) 0.0 - 5.6 %   TSH with free T4 reflex    Collection Time: 02/03/22  8:39 AM   Result Value Ref Range    TSH 0.82 0.30 - 5.00 uIU/mL          1-5-2022  Sinus rhythm   Inferior infarct , age undetermined   Cannot rule out Anterior infarct , age undetermined   Abnormal ECG   When compared with ECG of 18-DEC-2019 11:41,   No significant change was found   Confirmed by GENERATED REPORT, COMPUTER ([id number]),  Aasen, Bradley ([id number]) on 1/5/2022 2:27:25 PM       Revised Cardiac Risk Index (RCRI):  The patient has the following serious cardiovascular risks for perioperative complications:   - No serious cardiac risks = 0 points     RCRI Interpretation: 1 point: Class II (low risk - 0.9% complication rate)        I spent 40 minutes on this encounter, including reviewing interval history since last visit, examining the patient, explaining and counseling the issues enumerated in the Assessment and Plan (patient given a copy), ordering indicated tests, preparing preop report for external transmission      Signed Electronically by: TRISTON CALVIN MD  Copy of this evaluation report is provided to requesting physician.

## 2022-02-03 NOTE — Clinical Note
Preop report ready to send to [facility], please include EKG image of January 5, 2022.

## 2022-02-21 ENCOUNTER — TRANSFERRED RECORDS (OUTPATIENT)
Dept: HEALTH INFORMATION MANAGEMENT | Facility: CLINIC | Age: 57
End: 2022-02-21
Payer: COMMERCIAL

## 2022-03-25 DIAGNOSIS — E66.9 DIABETES MELLITUS TYPE 2 IN OBESE: Primary | ICD-10-CM

## 2022-03-25 DIAGNOSIS — E11.69 DIABETES MELLITUS TYPE 2 IN OBESE: Primary | ICD-10-CM

## 2022-03-28 NOTE — TELEPHONE ENCOUNTER
Patient running low on medication    Medication:   Disp Refills Start End AYSE   metFORMIN (GLUCOPHAGE) 1000 MG tablet   11/20/2017  --   Sig: TAKE 1 TABLET BY MOUTH TWICE DAILY WITH MEALS.       Pharmacy:  [facility] [place] - [address] [place] [address]  [phone]    Last OV:  2/03/2022

## 2022-05-05 ENCOUNTER — OFFICE VISIT (OUTPATIENT)
Dept: INTERNAL MEDICINE | Facility: CLINIC | Age: 57
End: 2022-05-05
Payer: COMMERCIAL

## 2022-05-05 VITALS
DIASTOLIC BLOOD PRESSURE: 83 MMHG | WEIGHT: 313 LBS | OXYGEN SATURATION: 98 % | BODY MASS INDEX: 37.12 KG/M2 | SYSTOLIC BLOOD PRESSURE: 124 MMHG | HEART RATE: 76 BPM

## 2022-05-05 DIAGNOSIS — I10 ESSENTIAL HYPERTENSION WITH GOAL BLOOD PRESSURE LESS THAN 140/90: ICD-10-CM

## 2022-05-05 DIAGNOSIS — Z23 HIGH PRIORITY FOR COVID-19 VACCINATION: ICD-10-CM

## 2022-05-05 DIAGNOSIS — E78.5 HYPERLIPIDEMIA LDL GOAL <100: ICD-10-CM

## 2022-05-05 DIAGNOSIS — Z12.5 SCREENING PSA (PROSTATE SPECIFIC ANTIGEN): ICD-10-CM

## 2022-05-05 DIAGNOSIS — E66.9 DIABETES MELLITUS TYPE 2 IN OBESE: Primary | ICD-10-CM

## 2022-05-05 DIAGNOSIS — E66.01 MORBID OBESITY (H): ICD-10-CM

## 2022-05-05 DIAGNOSIS — E11.69 DIABETES MELLITUS TYPE 2 IN OBESE: Primary | ICD-10-CM

## 2022-05-05 LAB
CHOLEST SERPL-MCNC: 88 MG/DL
FASTING STATUS PATIENT QL REPORTED: YES
HBA1C MFR BLD: 7 % (ref 0–5.6)
HDLC SERPL-MCNC: 36 MG/DL
LDLC SERPL CALC-MCNC: 34 MG/DL
PSA SERPL-MCNC: 15.37 UG/L (ref 0–3.5)
TRIGL SERPL-MCNC: 90 MG/DL

## 2022-05-05 PROCEDURE — 91305 COVID-19,PF,PFIZER (12+ YRS): CPT | Performed by: INTERNAL MEDICINE

## 2022-05-05 PROCEDURE — 36415 COLL VENOUS BLD VENIPUNCTURE: CPT | Performed by: INTERNAL MEDICINE

## 2022-05-05 PROCEDURE — 83036 HEMOGLOBIN GLYCOSYLATED A1C: CPT | Performed by: INTERNAL MEDICINE

## 2022-05-05 PROCEDURE — 80061 LIPID PANEL: CPT | Performed by: INTERNAL MEDICINE

## 2022-05-05 PROCEDURE — 0054A COVID-19,PF,PFIZER (12+ YRS): CPT | Performed by: INTERNAL MEDICINE

## 2022-05-05 PROCEDURE — 99214 OFFICE O/P EST MOD 30 MIN: CPT | Mod: 25 | Performed by: INTERNAL MEDICINE

## 2022-05-05 PROCEDURE — G0103 PSA SCREENING: HCPCS | Performed by: INTERNAL MEDICINE

## 2022-05-05 RX ORDER — HYDROCODONE BITARTRATE AND ACETAMINOPHEN 5; 325 MG/1; MG/1
TABLET ORAL
COMMUNITY
Start: 2022-02-10 | End: 2022-08-02

## 2022-05-05 NOTE — PROGRESS NOTES
Office Visit - Follow Up   Walter Santa   57 year old male    Date of Visit: 5/5/2022    Chief Complaint   Patient presents with     RECHECK     3 Month F/U        -------------------------------------------------------------------------------------------------------------------------  Assessment and Plan    Follow-up multiple issues, monitoring his diabetes and cholesterol,   aWlter is fine after the right arthroscopic meniscus surgery done February 10, 2022.    He reports that his mobility is good, although sometimes he still has some knee pain, and takes an occasional oxycodone tablet.     He is fasting his morning, so we will get a lipid panel and A1c, also check screening PSA  Now that the weather is getting warmer, he plans to be more physically active, watch his diet meticulously, and lose some weight  He did tell me that he experiences some tingling and numbness in his toes which could be early neuropathy.  We will need to keep an eye on that issue.    He told me that he has a follow-up visit coming up at [facility] because of his kidney stone in January 2022    He is going to get shot #4 Pfizer today May 5, 2022    Arthroscopic right knee surgery to repair torn meniscus 2/10/2022 to be done at [facility]    Type 2 diabetes, has been on long-term metformin.  He told me he is due for an eye exam, he goes to [facility]  A1c 7.7 measured February 3, 2022    Might possibly have some early mild diabetic neuropathy symptoms, with some tingling and numbness in his toes     Hyperlipidemia, on atorvastatin, no history of cardiovascular events     Obesity with body mass index of 37, hopefully after he gets his knee fixed, he'll be able to be even more physically active, but of course also needs to work on dietary discipline.  Wt Readings from Last 5 Encounters:   05/05/22 142 kg (313 lb)   02/03/22 143 kg (315 lb 3.2 oz)   01/05/22 140.6 kg (310 lb)   03/31/21 (!) 151.5 kg (334 lb)   12/18/19  149.7 kg (330 lb)       Kidney stone, passed 1-7-2022 1-5-2022  CT Abdomen Pelvis w Contrast  IMPRESSION: 5 mm calculus at the left ureterovesical junction causes mild left hydronephrosis and hydroureter with a delayed nephrogram and perinephric infiltration.     Fully vaccinated and boosted for COVID-19, received his third shot of Pfizer October 2021. [facility] told him to bring his vaccination card with him, and apparently they're not requiring him to be tested.     Possible Sleep Apnea: He says he does have some nighttime awakenings, but then falls back to sleep, and reports good energy level during the day.     Right knee torn medial meniscus  Right knee has been more painful since beginning 2022, affects mobility  History meniscus repair left side approx 2014  No history any problems with anaesthesia, or any bleeding or clotting problems     Type 2 diabetes  metFORMIN (GLUCOPHAGE) 1000 MG tablet        TAKE 1 TABLET BY MOUTH TWICE DAILY WITH MEALS..  9-5-2019  Hemoglobin A1C 3.5 - 6.0 % 7.4      Obesity  Wt Readings from Last 5 Encounters:   05/05/22 142 kg (313 lb)   02/03/22 143 kg (315 lb 3.2 oz)   01/05/22 140.6 kg (310 lb)   03/31/21 (!) 151.5 kg (334 lb)   12/18/19 149.7 kg (330 lb)     Hypertension   lisinopril (PRINIVIL,ZESTRIL) 10 MG tablet    Take 1 tablet (10 mg total) by mouth daily.    90   BP Readings from Last 6 Encounters:   05/05/22 124/83   02/03/22 129/89   01/05/22 (!) 130/92   03/31/21 (!) 155/83   12/18/19 134/86   09/05/19 132/74      Abnormal ECG, walks 10K steps a day  Non-smoker  No history cardiac events     1-5-2022  Sinus rhythm   Inferior infarct , age undetermined   Cannot rule out Anterior infarct , age undetermined   Abnormal ECG   When compared with ECG of 18-DEC-2019 11:41,   No significant change was found   Confirmed by GENERATED REPORT, COMPUTER ([id number]),  Aasen, Bradley ([id number]) on 1/5/2022 2:27:25 PM      Hyperlipidemia  atorvastatin (LIPITOR) 20 MG  tablet    Take 1 tablet (20 mg total) by mouth daily.     Kidney stone, passed 1-7-2022 1-5-2022  CT Abdomen Pelvis w Contrast  IMPRESSION: 5 mm calculus at the left ureterovesical junction causes mild left hydronephrosis and hydroureter with a delayed nephrogram and perinephric infiltration.     Suspected sleep apnea, mild  He told me that he might wake up 2 or 3 times during the night, sometimes to urinate, but then he goes back to sleep and then when he finally gets out of bed he feels reasonably refreshed and is not bothered by daytime sleepiness    Colonoscopy 8/1/2013 was normal, recheck 10 years would correspond to 2023    Family history of cerebral aneurysm (mother) and abdominal aortic aneurysm (grandfather)   I told Walter he does not need to worry about these for himself, because he is aorta was included in a CT abdomen January 5, 2022, and he had a magnetic resonance angiogram of the brain with [facility] December 2013     He recalls COVID booster (shot #3) 10-  He is going to get shot #4 Pfizer today May 5, 2022    --------------------------------------------------------------------------------------------------------------------------  History of Present Illness  This 57 year old old   Follow-up multiple issues, monitoring his diabetes and cholesterol,   Walter is fine after the right arthroscopic meniscus surgery done February 10, 2022.    He reports that his mobility is good, although sometimes he still has some knee pain, and takes an occasional oxycodone tablet.     He is fasting his morning, so we will get a lipid panel and A1c, also check screening PSA  Now that the weather is getting warmer, he plans to be more physically active, watch his diet meticulously, and lose some weight  He did tell me that he experiences some tingling and numbness in his toes which could be early neuropathy.  We will need to keep an eye on that issue.    He told me that he has a follow-up visit coming up at  [facility] because of his kidney stone in January 2022    He is going to get shot #4 Pfizer today May 5, 2022      Wt Readings from Last 3 Encounters:   05/05/22 142 kg (313 lb)   02/03/22 143 kg (315 lb 3.2 oz)   01/05/22 140.6 kg (310 lb)     BP Readings from Last 3 Encounters:   05/05/22 124/83   02/03/22 129/89   01/05/22 (!) 130/92     ---------------------------------------------------------------------------------------------------------------------------    Medications, Allergies, Social, and Problem List   Current Outpatient Medications   Medication Sig Dispense Refill     atorvastatin (LIPITOR) 20 MG tablet Take 1 tablet (20 mg) by mouth daily 90 tablet 3     HYDROcodone-acetaminophen (NORCO) 5-325 MG tablet TAKE 1-2 TABLETS BY MOUTH EVERY4-6 HOURS AS NEEDED FOR PAIN MAX OF 8 TABLETS IN 24 HOURS       lisinopril (ZESTRIL) 5 MG tablet Take 2 tablets (10 mg) by mouth daily 180 tablet 3     metFORMIN (GLUCOPHAGE) 1000 MG tablet TAKE 1 TABLET BY MOUTH TWICE DAILY WITH MEALS. 180 tablet 3     No Known Allergies  Social History     Tobacco Use     Smoking status: Never Smoker     Smokeless tobacco: Never Used     Patient Active Problem List   Diagnosis     Hyperlipidemia LDL goal <100     Diabetes mellitus type 2 in obese (H)     Attention Deficit Disorder Without Hyperactivity     Obesity (BMI 35.0-39.9) with comorbidity (H)     Essential hypertension with goal blood pressure less than 140/90     Abnormal electrocardiogram        Reviewed, reconciled and updated       Physical Exam   General Appearance:   Walter looks good today, blood pressure control    /83   Pulse 76   Wt 142 kg (313 lb)   SpO2 98%   BMI 37.12 kg/m           Additional Information   I spent 20 minutes on this encounter, including reviewing interval history since last visit, examining the patient, explaining and counseling the issues enumerated in the Assessment and Plan (patient given a copy), ordering indicated tests      TRISTON CALVIN MD, MD

## 2022-05-05 NOTE — PATIENT INSTRUCTIONS
Follow-up multiple issues, monitoring his diabetes and cholesterol,   Walter is fine after the right arthroscopic meniscus surgery done February 10, 2022.    He reports that his mobility is good, although sometimes he still has some knee pain, and takes an occasional oxycodone tablet.     He is fasting his morning, so we will get a lipid panel and A1c, also check screening PSA  Now that the weather is getting warmer, he plans to be more physically active, watch his diet meticulously, and lose some weight  He did tell me that he experiences some tingling and numbness in his toes which could be early neuropathy.  We will need to keep an eye on that issue.    He told me that he has a follow-up visit coming up at [facility] because of his kidney stone in January 2022    He is going to get shot #4 Pfizer today May 5, 2022    Arthroscopic right knee surgery to repair torn meniscus 2/10/2022 to be done at [facility]    Type 2 diabetes, has been on long-term metformin.  He told me he is due for an eye exam, he goes to [facility]  A1c 7.7 measured February 3, 2022    Might possibly have some early mild diabetic neuropathy symptoms, with some tingling and numbness in his toes     Hyperlipidemia, on atorvastatin, no history of cardiovascular events     Obesity with body mass index of 37, hopefully after he gets his knee fixed, he'll be able to be even more physically active, but of course also needs to work on dietary discipline.  Wt Readings from Last 5 Encounters:   05/05/22 142 kg (313 lb)   02/03/22 143 kg (315 lb 3.2 oz)   01/05/22 140.6 kg (310 lb)   03/31/21 (!) 151.5 kg (334 lb)   12/18/19 149.7 kg (330 lb)       Kidney stone, passed 1-7-2022 1-5-2022  CT Abdomen Pelvis w Contrast  IMPRESSION: 5 mm calculus at the left ureterovesical junction causes mild left hydronephrosis and hydroureter with a delayed nephrogram and perinephric infiltration.     Fully vaccinated and boosted for COVID-19, received  his third shot of Pfizer October 2021. [facility] told him to bring his vaccination card with him, and apparently they're not requiring him to be tested.     Possible Sleep Apnea: He says he does have some nighttime awakenings, but then falls back to sleep, and reports good energy level during the day.     Right knee torn medial meniscus  Right knee has been more painful since beginning 2022, affects mobility  History meniscus repair left side approx 2014  No history any problems with anaesthesia, or any bleeding or clotting problems     Type 2 diabetes  metFORMIN (GLUCOPHAGE) 1000 MG tablet        TAKE 1 TABLET BY MOUTH TWICE DAILY WITH MEALS..  9-5-2019  Hemoglobin A1C 3.5 - 6.0 % 7.4      Obesity  Wt Readings from Last 5 Encounters:   05/05/22 142 kg (313 lb)   02/03/22 143 kg (315 lb 3.2 oz)   01/05/22 140.6 kg (310 lb)   03/31/21 (!) 151.5 kg (334 lb)   12/18/19 149.7 kg (330 lb)     Hypertension   lisinopril (PRINIVIL,ZESTRIL) 10 MG tablet    Take 1 tablet (10 mg total) by mouth daily.    90   BP Readings from Last 6 Encounters:   05/05/22 124/83   02/03/22 129/89   01/05/22 (!) 130/92   03/31/21 (!) 155/83   12/18/19 134/86   09/05/19 132/74      Abnormal ECG, walks 10K steps a day  Non-smoker  No history cardiac events     1-5-2022  Sinus rhythm   Inferior infarct , age undetermined   Cannot rule out Anterior infarct , age undetermined   Abnormal ECG   When compared with ECG of 18-DEC-2019 11:41,   No significant change was found   Confirmed by GENERATED REPORT, COMPUTER ([id number]),  Aasen, Bradley ([id number]) on 1/5/2022 2:27:25 PM      Hyperlipidemia  atorvastatin (LIPITOR) 20 MG tablet    Take 1 tablet (20 mg total) by mouth daily.     Kidney stone, passed 1-7-2022 1-5-2022  CT Abdomen Pelvis w Contrast  IMPRESSION: 5 mm calculus at the left ureterovesical junction causes mild left hydronephrosis and hydroureter with a delayed nephrogram and perinephric infiltration.     Suspected sleep apnea,  mild  He told me that he might wake up 2 or 3 times during the night, sometimes to urinate, but then he goes back to sleep and then when he finally gets out of bed he feels reasonably refreshed and is not bothered by daytime sleepiness    Family history of cerebral aneurysm (mother) and abdominal aortic aneurysm (grandfather)   I told Walter he does not need to worry about these for himself, because he is aorta was included in a CT abdomen January 5, 2022, and he had a magnetic resonance angiogram of the brain with [facility] December 2013    Colonoscopy 8/1/2013 was normal, recheck 10 years would correspond to 2023     He recalls COVID booster (shot #3) 10-  He is going to get shot #4 Pfizer today May 5, 2022

## 2022-05-11 ENCOUNTER — TRANSFERRED RECORDS (OUTPATIENT)
Dept: HEALTH INFORMATION MANAGEMENT | Facility: CLINIC | Age: 57
End: 2022-05-11
Payer: COMMERCIAL

## 2022-06-01 ENCOUNTER — TRANSFERRED RECORDS (OUTPATIENT)
Dept: HEALTH INFORMATION MANAGEMENT | Facility: CLINIC | Age: 57
End: 2022-06-01
Payer: COMMERCIAL

## 2022-06-14 ENCOUNTER — HOSPITAL ENCOUNTER (OUTPATIENT)
Dept: NUCLEAR MEDICINE | Facility: CLINIC | Age: 57
Setting detail: NUCLEAR MEDICINE
Discharge: HOME | End: 2022-06-14
Attending: UROLOGY
Payer: COMMERCIAL

## 2022-06-14 DIAGNOSIS — C61 PROSTATE CANCER (H): ICD-10-CM

## 2022-06-14 PROCEDURE — 343N000001 HC RX 343: Performed by: UROLOGY

## 2022-06-14 PROCEDURE — A9503 TC99M MEDRONATE: HCPCS | Performed by: UROLOGY

## 2022-06-14 PROCEDURE — 78306 BONE IMAGING WHOLE BODY: CPT

## 2022-06-14 RX ORDER — TC 99M MEDRONATE 20 MG/10ML
25 INJECTION, POWDER, LYOPHILIZED, FOR SOLUTION INTRAVENOUS ONCE
Status: COMPLETED | OUTPATIENT
Start: 2022-06-14 | End: 2022-06-14

## 2022-06-14 RX ADMIN — TC 99M MEDRONATE 26.8 MCI.: 20 INJECTION, POWDER, LYOPHILIZED, FOR SOLUTION INTRAVENOUS at 10:50

## 2022-06-15 ENCOUNTER — TRANSFERRED RECORDS (OUTPATIENT)
Dept: HEALTH INFORMATION MANAGEMENT | Facility: CLINIC | Age: 57
End: 2022-06-15
Payer: COMMERCIAL

## 2022-08-02 ENCOUNTER — OFFICE VISIT (OUTPATIENT)
Dept: INTERNAL MEDICINE | Facility: CLINIC | Age: 57
End: 2022-08-02
Payer: COMMERCIAL

## 2022-08-02 VITALS
HEART RATE: 72 BPM | SYSTOLIC BLOOD PRESSURE: 132 MMHG | WEIGHT: 315 LBS | DIASTOLIC BLOOD PRESSURE: 80 MMHG | HEIGHT: 77 IN | OXYGEN SATURATION: 98 % | BODY MASS INDEX: 37.19 KG/M2 | TEMPERATURE: 97.8 F

## 2022-08-02 DIAGNOSIS — E66.9 DIABETES MELLITUS TYPE 2 IN OBESE: ICD-10-CM

## 2022-08-02 DIAGNOSIS — Z01.818 PREOP GENERAL PHYSICAL EXAM: Primary | ICD-10-CM

## 2022-08-02 DIAGNOSIS — I10 ESSENTIAL HYPERTENSION WITH GOAL BLOOD PRESSURE LESS THAN 140/90: ICD-10-CM

## 2022-08-02 DIAGNOSIS — C61 PROSTATE CANCER (H): ICD-10-CM

## 2022-08-02 DIAGNOSIS — E11.69 DIABETES MELLITUS TYPE 2 IN OBESE: ICD-10-CM

## 2022-08-02 LAB
ALBUMIN UR-MCNC: NEGATIVE MG/DL
ANION GAP SERPL CALCULATED.3IONS-SCNC: 8 MMOL/L (ref 7–15)
APPEARANCE UR: CLEAR
ATRIAL RATE - MUSE: 61 BPM
BILIRUB UR QL STRIP: NEGATIVE
BUN SERPL-MCNC: 15.4 MG/DL (ref 8–23)
CALCIUM SERPL-MCNC: 9.2 MG/DL (ref 8.6–10)
CHLORIDE SERPL-SCNC: 102 MMOL/L (ref 98–107)
COLOR UR AUTO: YELLOW
CREAT SERPL-MCNC: 0.81 MG/DL (ref 0.67–1.17)
DEPRECATED HCO3 PLAS-SCNC: 28 MMOL/L (ref 22–29)
DIASTOLIC BLOOD PRESSURE - MUSE: NORMAL MMHG
ERYTHROCYTE [DISTWIDTH] IN BLOOD BY AUTOMATED COUNT: 12.3 % (ref 10–15)
GFR SERPL CREATININE-BSD FRML MDRD: >90 ML/MIN/1.73M2
GLUCOSE SERPL-MCNC: 137 MG/DL (ref 70–99)
GLUCOSE UR STRIP-MCNC: 250 MG/DL
HBA1C MFR BLD: 7.2 % (ref 0–5.6)
HCT VFR BLD AUTO: 40 % (ref 40–53)
HGB BLD-MCNC: 13.6 G/DL (ref 13.3–17.7)
HGB UR QL STRIP: NEGATIVE
INTERPRETATION ECG - MUSE: NORMAL
KETONES UR STRIP-MCNC: ABNORMAL MG/DL
LEUKOCYTE ESTERASE UR QL STRIP: NEGATIVE
MCH RBC QN AUTO: 30.4 PG (ref 26.5–33)
MCHC RBC AUTO-ENTMCNC: 34 G/DL (ref 31.5–36.5)
MCV RBC AUTO: 90 FL (ref 78–100)
NITRATE UR QL: NEGATIVE
P AXIS - MUSE: 78 DEGREES
PH UR STRIP: 5.5 [PH] (ref 5–8)
PLATELET # BLD AUTO: 164 10E3/UL (ref 150–450)
POTASSIUM SERPL-SCNC: 4.3 MMOL/L (ref 3.4–5.3)
PR INTERVAL - MUSE: 116 MS
QRS DURATION - MUSE: 156 MS
QT - MUSE: 442 MS
QTC - MUSE: 444 MS
R AXIS - MUSE: -5 DEGREES
RBC # BLD AUTO: 4.47 10E6/UL (ref 4.4–5.9)
RBC #/AREA URNS AUTO: NORMAL /HPF
SODIUM SERPL-SCNC: 138 MMOL/L (ref 136–145)
SP GR UR STRIP: 1.02 (ref 1–1.03)
SYSTOLIC BLOOD PRESSURE - MUSE: NORMAL MMHG
T AXIS - MUSE: -11 DEGREES
UROBILINOGEN UR STRIP-ACNC: 1 E.U./DL
VENTRICULAR RATE- MUSE: 61 BPM
WBC # BLD AUTO: 5.6 10E3/UL (ref 4–11)
WBC #/AREA URNS AUTO: NORMAL /HPF

## 2022-08-02 PROCEDURE — 80048 BASIC METABOLIC PNL TOTAL CA: CPT | Performed by: INTERNAL MEDICINE

## 2022-08-02 PROCEDURE — 83036 HEMOGLOBIN GLYCOSYLATED A1C: CPT | Performed by: INTERNAL MEDICINE

## 2022-08-02 PROCEDURE — 85027 COMPLETE CBC AUTOMATED: CPT | Performed by: INTERNAL MEDICINE

## 2022-08-02 PROCEDURE — 93005 ELECTROCARDIOGRAM TRACING: CPT | Performed by: INTERNAL MEDICINE

## 2022-08-02 PROCEDURE — 36415 COLL VENOUS BLD VENIPUNCTURE: CPT | Performed by: INTERNAL MEDICINE

## 2022-08-02 PROCEDURE — 93010 ELECTROCARDIOGRAM REPORT: CPT | Performed by: INTERNAL MEDICINE

## 2022-08-02 PROCEDURE — 99214 OFFICE O/P EST MOD 30 MIN: CPT | Performed by: INTERNAL MEDICINE

## 2022-08-02 PROCEDURE — 81001 URINALYSIS AUTO W/SCOPE: CPT | Performed by: INTERNAL MEDICINE

## 2022-08-02 NOTE — PROGRESS NOTES
[facility]  [address] [place] [id number]  Phone: [phone]  Fax: [phone]  Primary Provider: Kojo Calvin  Pre-op Performing Provider: KOJO CALVIN      PREOPERATIVE EVALUATION:  Today's date: 8/2/2022    Walter Santa is a 57 year old male who presents for a preoperative evaluation.    Surgical Information:  Surgery/Procedure: Prostatectomy  Surgery Location: [facility]  Surgeon: Dr Gallego  Surgery Date: 8/16/2022  Time of Surgery:   Where patient plans to recover: At home with family  Fax number for surgical facility: Note does not need to be faxed, will be available electronically in Epic.    Type of Anesthesia Anticipated: General    Assessment & Plan     The proposed surgical procedure is considered INTERMEDIATE risk.    Satisfactory preoperative medical examination in anticipation of prostatectomy surgery scheduled for August 16, 2022 to be done at [facility], for indication of localized prostate cancer.    Walter's diagnosis started with an elevated PSA of about 15 from May 5, 2022.  He is gone through the biopsy and staging work-up, and will be having laparoscopic robotic assisted surgery.    Walter's general feeling pretty good.  He has been staying physically active, chopping wood at his cabin.    Electrocardiogram done today August 2, 2022 shows normal sinus rhythm, continues to have inferior infarct pattern with a Q wave in lead III, right bundle branch block pattern evident in his precordial leads, which is new since the previous electrocardiogram of January 5, 2022 (which was preop for his right knee surgery), but I do not think that the right bundle branch block is anything to worry about.  Walter is experiencing any cardiac symptoms.  He did just fine after his arthroscopic right knee surgery.    We did a CBC, basic metabolic panel, A1c, and urinalysis  CBC normal  Basic metabolic panel has a slight glucose  elevation  A1c 7.2 showing diabetes control is not quite at the goal of less than 7, but not too bad  Urinalysis has small amount of glucose, otherwise normal.    Because he will probably spend the night at [facility], he would need to get a formal COVID test done in 3 days prior to surgery, and has already been arranged to be done here at [facility] on August 12.    With regards to Walter's medications, I told him on the day before surgery he can take his medications per usual routine, including his suppertime dose of metformin, evening dose of atorvastatin.    I told him that on the morning of his surgery, he can take his morning dose of lisinopril per usual routine  But  DO NOT TAKE METFORMIN, since he will be fasting,    Possible Sleep Apnea: snores, not bothered by daytime sleepness    RECOMMENDATION:  APPROVAL GIVEN to proceed with proposed procedure, without further diagnostic evaluation.    Subjective     HPI related to upcoming procedure:     May 5, 2022  Prostate Specific Antigen Screen 0.00 - 3.50 ug/L 15.37 High      Arthroscopic right knee surgery to repair torn meniscus 2/10/2022 done at [facility]    Type 2 diabetes, has been on long-term metformin.  He told me he is due for an eye exam, he goes to [facility]  metFORMIN (GLUCOPHAGE) 1000 MG tablet        TAKE 1 TABLET BY MOUTH TWICE DAILY WITH MEALS    May 5, 2022  Hemoglobin A1C 0.0 - 5.6 % 7.0 High      Might possibly have some early mild diabetic neuropathy symptoms, with some tingling and numbness in his toes     Hyperlipidemia, on atorvastatin, no history of cardiovascular events     Obesity with body mass index of 37, hopefully after he gets his knee fixed, he'll be able to be even more physically active, but of course also needs to work on dietary discipline.  Wt Readings from Last 5 Encounters:   08/02/22 144.7 kg (319 lb)   05/05/22 142 kg (313 lb)   02/03/22 143 kg (315 lb 3.2 oz)   01/05/22 140.6 kg (310 lb)   03/31/21 (!) 151.5  kg (334 lb)     Kidney stone, passed 1-7-2022 1-5-2022  CT Abdomen Pelvis w Contrast  IMPRESSION: 5 mm calculus at the left ureterovesical junction causes mild left hydronephrosis and hydroureter with a delayed nephrogram and perinephric infiltration.     Fully vaccinated and boosted for COVID-19, received his third shot of Pfizer October 2021. [facility] told him to bring his vaccination card with him, and apparently they're not requiring him to be tested.    Possible Sleep Apnea: He says he does have some nighttime awakenings, but then falls back to sleep, and reports good energy level during the day.    Hypertension   lisinopril (PRINIVIL,ZESTRIL) 10 MG tablet    Take 1 tablet (10 mg total) by mouth daily.    90   BP Readings from Last 6 Encounters:   08/02/22 132/80   05/05/22 124/83   02/03/22 129/89   01/05/22 (!) 130/92   03/31/21 (!) 155/83   12/18/19 134/86     Abnormal ECG, inferior infarct pattern seen since 2017, right bundle branch block present August 2, 2022  walks 10K steps a day  Non-smoker  No history cardiac events    8-2-2022  Sinus rhythm   Right bundle branch block   Inferior infarct (cited on or before 20-NOV-2017)   Abnormal ECG   When compared with ECG of 05-JAN-2022 05:23,   Right bundle branch block is now Present   Minimal criteria for Anterior infarct are no longer Present    Hyperlipidemia  atorvastatin (LIPITOR) 20 MG tablet    Take 1 tablet (20 mg total) by mouth daily.    5-5-2022      LDL Cholesterol Calculated <=129 mg/dL 34      Direct Measure HDL >=40 mg/dL 36 Low      Triglycerides <=149 mg/dL 90      Cholesterol <=199 mg/dL 88     Kidney stone, passed 1-7-2022 1-5-2022  CT Abdomen Pelvis w Contrast  IMPRESSION: 5 mm calculus at the left ureterovesical junction causes mild left hydronephrosis and hydroureter with a delayed nephrogram and perinephric infiltration.     Suspected sleep apnea, mild  He told me that he might wake up 2 or 3 times during the night,  sometimes to urinate, but then he goes back to sleep and then when he finally gets out of bed he feels reasonably refreshed and is not bothered by daytime sleepiness     Colonoscopy 8/1/2013 was normal, recheck 10 years would correspond to 2023     Family history of cerebral aneurysm (mother) and abdominal aortic aneurysm (grandfather)   I told Walter he does not need to worry about these for himself, because he is aorta was included in a CT abdomen January 5, 2022, and he had a magnetic resonance angiogram of the brain with [facility] December 2013     He recalls COVID booster (shot #3) 10-  He is going to get shot #4 Pfizer May 5, 2022      Preop Questions 8/2/2022   1. Have you ever had a heart attack or stroke? No   2. Have you ever had surgery on your heart or blood vessels, such as a stent placement, a coronary artery bypass, or surgery on an artery in your head, neck, heart, or legs? No   3. Do you have chest pain with activity? No   4. Do you have a history of  heart failure? No   5. Do you currently have a cold, bronchitis or symptoms of other infection? No   6. Do you have a cough, shortness of breath, or wheezing? No   7. Do you or anyone in your family have previous history of blood clots? No   8. Do you or does anyone in your family have a serious bleeding problem such as prolonged bleeding following surgeries or cuts? No   9. Have you ever had problems with anemia or been told to take iron pills? No   10. Have you had any abnormal blood loss such as black, tarry or bloody stools? No   11. Have you ever had a blood transfusion? No   12. Are you willing to have a blood transfusion if it is medically needed before, during, or after your surgery? Yes   13. Have you or any of your relatives ever had problems with anesthesia? No   14. Do you have sleep apnea, excessive snoring or daytime drowsiness? YES - snores   14a. Do you have a CPAP machine? No   15. Do you have any artifical heart valves or  other implanted medical devices like a pacemaker, defibrillator, or continuous glucose monitor? No   16. Do you have artificial joints? No   17. Are you allergic to latex? No       Review of Systems  CONSTITUTIONAL: NEGATIVE for fever, chills, change in weight  INTEGUMENTARY/SKIN: NEGATIVE for worrisome rashes, moles or lesions  EYES: NEGATIVE for vision changes or irritation  ENT/MOUTH: NEGATIVE for ear, mouth and throat problems  RESP: NEGATIVE for significant cough or SOB  CV: NEGATIVE for chest pain, palpitations or peripheral edema  GI: NEGATIVE for nausea, abdominal pain, heartburn, or change in bowel habits  : NEGATIVE for frequency, dysuria, or hematuria  MUSCULOSKELETAL: NEGATIVE for significant arthralgias or myalgia  NEURO: NEGATIVE for weakness, dizziness or paresthesias  ENDOCRINE: NEGATIVE for temperature intolerance, skin/hair changes  HEME: NEGATIVE for bleeding problems  PSYCHIATRIC: NEGATIVE for changes in mood or affect    Patient Active Problem List    Diagnosis Date Noted     Abnormal electrocardiogram 02/03/2022     Priority: Medium     Diabetes mellitus type 2 in obese (H)      Priority: Medium     Created by [facility] Annotation: Mar 10 2009  2:03PM - Génesis Spears: 2007--had   fasting glucose of 149 mg/dL at [facility].  a1c was 7.4         Obesity (BMI 35.0-39.9) with comorbidity (H) 12/04/2018     Priority: Medium     Essential hypertension with goal blood pressure less than 140/90 12/04/2018     Priority: Medium     Attention Deficit Disorder Without Hyperactivity      Priority: Medium     Created by Conversion  Replacement Utility updated for latest IMO load         Hyperlipidemia LDL goal <100      Priority: Medium     Created by [facility] Annotation: Apr  3 2009  2:50PM - Génesis Spears: had LDL of   120   with DM2 in 2007          No past medical history on file.  Past Surgical History:   Procedure Laterality Date     ARTHROSCOPY KNEE Right 09/2011  "    PALMAR FASCIECTOMY Left 03/09/2016    Release left middle and small fingers, Dr. Mino Soto     SHOULDER SURGERY Right     3 surgeries of R shoulder, bursa problem. has residual weakness in elevation of shoulder.      TONSILLECTOMY       Current Outpatient Medications   Medication Sig Dispense Refill     atorvastatin (LIPITOR) 20 MG tablet Take 1 tablet (20 mg) by mouth daily 90 tablet 3     lisinopril (ZESTRIL) 5 MG tablet Take 2 tablets (10 mg) by mouth daily 180 tablet 3     metFORMIN (GLUCOPHAGE) 1000 MG tablet TAKE 1 TABLET BY MOUTH TWICE DAILY WITH MEALS. 180 tablet 3       No Known Allergies     Social History     Tobacco Use     Smoking status: Never Smoker     Smokeless tobacco: Never Used   Substance Use Topics     Alcohol use: Not on file       History   Drug Use Not on file         Objective     /80 (BP Location: Right arm, Patient Position: Sitting)   Pulse 72   Temp 97.8  F (36.6  C)   Ht 1.956 m (6' 5\")   Wt 144.7 kg (319 lb)   SpO2 98%   BMI 37.83 kg/m      Physical Exam     General: Alert, in no distress  Skin: No significant lesion seen.  Eyes/nose/throat: Eyes without scleral icterus, eye movements normal, pupils equal and reactive, oropharynx clear, ears with normal TM's  MSK: Neck with good ROM  Pulm: Lungs clear to auscultation bilaterally  Cardiac: Heart with regular rate and rhythm, no murmur or gallop  GI: Abdomen obese, soft, nontender. No palpable enlargement of liver or spleen  MSK: Extremities no tenderness or edema  Neuro: Moves all extremities, without focal weakness  Psych: Alert, normal mental status. Normal affect and speech      Recent Labs   Lab Test 05/05/22  0809 02/03/22  0839 01/05/22  0518   HGB  --  14.8 15.0   PLT  --  182 178   NA  --  140 138   POTASSIUM  --  4.5 4.1   CR  --  0.90 1.33*   A1C 7.0* 7.7*  --         Diagnostics:  Recent Results (from the past 48 hour(s))   EKG 12-lead, tracing only    Collection Time: 08/02/22  9:42 AM   Result Value Ref " Range    Systolic Blood Pressure  mmHg    Diastolic Blood Pressure  mmHg    Ventricular Rate 61 BPM    Atrial Rate 61 BPM    ID Interval 116 ms    QRS Duration 156 ms     ms    QTc 444 ms    P Axis 78 degrees    R AXIS -5 degrees    T Axis -11 degrees    Interpretation ECG       Sinus rhythm  Right bundle branch block  Inferior infarct (cited on or before 20-NOV-2017)  Abnormal ECG  When compared with ECG of 05-JAN-2022 05:23,  Right bundle branch block is now Present  Minimal criteria for Anterior infarct are no longer Present  Confirmed by GAYLE SHEPHERD MD LOC:[facility] ([id number]) on 8/2/2022 3:03:01 PM     CBC with platelets    Collection Time: 08/02/22 10:28 AM   Result Value Ref Range    WBC Count 5.6 4.0 - 11.0 10e3/uL    RBC Count 4.47 4.40 - 5.90 10e6/uL    Hemoglobin 13.6 13.3 - 17.7 g/dL    Hematocrit 40.0 40.0 - 53.0 %    MCV 90 78 - 100 fL    MCH 30.4 26.5 - 33.0 pg    MCHC 34.0 31.5 - 36.5 g/dL    RDW 12.3 10.0 - 15.0 %    Platelet Count 164 150 - 450 10e3/uL   Basic metabolic panel  (Ca, Cl, CO2, Creat, Gluc, K, Na, BUN)    Collection Time: 08/02/22 10:28 AM   Result Value Ref Range    Creatinine 0.81 0.67 - 1.17 mg/dL    Sodium 138 136 - 145 mmol/L    Potassium 4.3 3.4 - 5.3 mmol/L    Urea Nitrogen 15.4 8.0 - 23.0 mg/dL    Chloride 102 98 - 107 mmol/L    Carbon Dioxide (CO2) 28 22 - 29 mmol/L    Anion Gap 8 7 - 15 mmol/L    Glucose 137 (H) 70 - 99 mg/dL    GFR Estimate >90 >60 mL/min/1.73m2    Calcium 9.2 8.6 - 10.0 mg/dL   Hemoglobin A1c    Collection Time: 08/02/22 10:28 AM   Result Value Ref Range    Hemoglobin A1C 7.2 (H) 0.0 - 5.6 %   UA with Microscopic reflex to Culture - lab collect    Collection Time: 08/02/22 10:29 AM    Specimen: Urine, Clean Catch   Result Value Ref Range    Color Urine Yellow Colorless, Straw, Light Yellow, Yellow    Appearance Urine Clear Clear    Glucose Urine 250  (A) Negative mg/dL    Bilirubin Urine Negative Negative    Ketones Urine Trace (A) Negative mg/dL    Specific  Gravity Urine 1.020 1.005 - 1.030    Blood Urine Negative Negative    pH Urine 5.5 5.0 - 8.0    Protein Albumin Urine Negative Negative mg/dL    Urobilinogen Urine 1.0 0.2, 1.0 E.U./dL    Nitrite Urine Negative Negative    Leukocyte Esterase Urine Negative Negative   Urine Microscopic    Collection Time: 08/02/22 10:29 AM   Result Value Ref Range    RBC Urine None Seen 0-2 /HPF /HPF    WBC Urine None Seen 0-5 /HPF /HPF        Revised Cardiac Risk Index (RCRI):  The patient has the following serious cardiovascular risks for perioperative complications:   - No serious cardiac risks = 0 points     RCRI Interpretation: 0 points: Class I (very low risk - 0.4% complication rate)     Signed Electronically by: TRISTON CALVIN MD  Copy of this evaluation report is provided to requesting physician.     :[id number]}

## 2022-08-02 NOTE — PATIENT INSTRUCTIONS
Satisfactory preoperative medical examination in anticipation of prostatectomy surgery scheduled for August 16, 2022 to be done at [facility], for indication of localized prostate cancer.    Walter's diagnosis started with an elevated PSA of about 15 from May 5, 2022.  He is gone through the biopsy and staging work-up, and will be having laparoscopic robotic assisted surgery.    Walter's general feeling pretty good.  He has been staying physically active, chopping wood at his cabin.    Electrocardiogram done today August 2, 2022 shows normal sinus rhythm, continues to have inferior infarct pattern with a Q wave in lead III, right bundle branch block pattern evident in his precordial leads, which is new since the previous electrocardiogram of January 5, 2022 (which was preop for his right knee surgery), but I do not think that the right bundle branch block is anything to worry about.  Walter is experiencing any cardiac symptoms.  He did just fine after his arthroscopic right knee surgery.    We did a CBC and basic metabolic panel.    Because he will probably spend the night at [facility], he would need to get a formal COVID test done in 3 days prior to surgery, and has already been arranged to be done here at [facility] on August 12.    With regards to Walter's medications, I told him on the day before surgery he can take his medications per usual routine, including his suppertime dose of metformin, evening dose of atorvastatin.    I told him that on the morning of his surgery, he can take his morning dose of lisinopril per usual routine  But  DO NOT TAKE METFORMIN, since he will be fasting,

## 2022-08-12 ENCOUNTER — TELEPHONE (OUTPATIENT)
Dept: LAB | Facility: CLINIC | Age: 57
End: 2022-08-12

## 2022-08-12 ENCOUNTER — LAB (OUTPATIENT)
Dept: LAB | Facility: CLINIC | Age: 57
End: 2022-08-12
Payer: COMMERCIAL

## 2022-08-12 DIAGNOSIS — Z20.822 ENCOUNTER FOR LABORATORY TESTING FOR COVID-19 VIRUS: ICD-10-CM

## 2022-08-12 LAB — SARS-COV-2 RNA RESP QL NAA+PROBE: NEGATIVE

## 2022-08-12 PROCEDURE — U0005 INFEC AGEN DETEC AMPLI PROBE: HCPCS

## 2022-08-12 PROCEDURE — U0003 INFECTIOUS AGENT DETECTION BY NUCLEIC ACID (DNA OR RNA); SEVERE ACUTE RESPIRATORY SYNDROME CORONAVIRUS 2 (SARS-COV-2) (CORONAVIRUS DISEASE [COVID-19]), AMPLIFIED PROBE TECHNIQUE, MAKING USE OF HIGH THROUGHPUT TECHNOLOGIES AS DESCRIBED BY CMS-2020-01-R: HCPCS

## 2022-08-15 ENCOUNTER — ANESTHESIA EVENT (OUTPATIENT)
Dept: SURGERY | Facility: CLINIC | Age: 57
End: 2022-08-15
Payer: COMMERCIAL

## 2022-08-15 RX ORDER — CETIRIZINE HYDROCHLORIDE 10 MG/1
10 TABLET ORAL DAILY PRN
COMMUNITY
End: 2023-05-09

## 2022-08-15 NOTE — PROGRESS NOTES
PTA medications updated by Medication Scribe prior to surgery via phone call with patient (last doses completed by Nurse)     Medication history sources: Patient, Surescripts and H&P  In the past week, patient estimated taking medication this percent of the time: Greater than 90%  Adherence assessment: N/A Not Observed    Significant changes made to the medication list:  None      Additional medication history information:   None    Medication reconciliation completed by provider prior to medication history? No    Time spent in this activity: 25 MINUTES    The information provided in this note is only as accurate as the sources available at the time of update(s)      Prior to Admission medications    Medication Sig Last Dose Taking? Auth Provider Long Term End Date   atorvastatin (LIPITOR) 20 MG tablet Take 1 tablet (20 mg) by mouth daily  at AM Yes Gerry Ibarra MD Yes    cetirizine (ZYRTEC) 10 MG tablet Take 10 mg by mouth daily as needed for allergies  at PRN Yes Reported, Patient     lisinopril (ZESTRIL) 5 MG tablet Take 2 tablets (10 mg) by mouth daily  at AM Yes Gerry Ibarra MD Yes    metFORMIN (GLUCOPHAGE) 1000 MG tablet TAKE 1 TABLET BY MOUTH TWICE DAILY WITH MEALS.  at PM Yes Kojo Parekh MD Yes

## 2022-08-15 NOTE — ANESTHESIA PREPROCEDURE EVALUATION
Anesthesia Pre-Procedure Evaluation    Patient: Walter Santa   MRN: [id number] : 1965        Procedure : Procedure(s):  NERVE SPARING ROBOTIC ASSISTED PROSTATECTOMY, LEFT PELVIC LYMPH NODE DISSECTION          No past medical history on file.   Past Surgical History:   Procedure Laterality Date     ARTHROSCOPY KNEE Right 2011     PALMAR FASCIECTOMY Left 2016    Release left middle and small fingers, Dr. Mino Soto     SHOULDER SURGERY Right     3 surgeries of R shoulder, bursa problem. has residual weakness in elevation of shoulder.      TONSILLECTOMY        No Known Allergies   Social History     Tobacco Use     Smoking status: Never Smoker     Smokeless tobacco: Never Used   Substance Use Topics     Alcohol use: Not on file      Wt Readings from Last 1 Encounters:   22 144.7 kg (319 lb)        Anesthesia Evaluation   Pt has had prior anesthetic.     No history of anesthetic complications       ROS/MED HX  ENT/Pulmonary:    (-) sleep apnea (not diagnosed but has some sxms)   Neurologic:       Cardiovascular:     (+) Dyslipidemia hypertension-range: controlled/ ----Previous cardiac testing   Echo: Date: Results:    Stress Test: Date: Results:    ECG Reviewed: Date: 2022 Results:  SR, RBBB (PMD aware)  Cath: Date: Results:      METS/Exercise Tolerance: >4 METS    Hematologic:       Musculoskeletal:       GI/Hepatic:    (-) GERD   Renal/Genitourinary:       Endo:     (+) type II DM, Not using insulin, Obesity (BMI 38),     Psychiatric/Substance Use:       Infectious Disease:       Malignancy:   (+) Malignancy, History of Prostate.Prostate CA Active status post.        Other:            Physical Exam    Airway        Mallampati: II   TM distance: > 3 FB   Neck ROM: full   Mouth opening: > 3 cm    Respiratory Devices and Support         Dental  no notable dental history         Cardiovascular   cardiovascular exam normal          Pulmonary   pulmonary exam normal                OUTSIDE  LABS:  CBC:   Lab Results   Component Value Date    WBC 5.6 08/02/2022    WBC 6.7 02/03/2022    HGB 13.6 08/02/2022    HGB 14.8 02/03/2022    HCT 40.0 08/02/2022    HCT 42.8 02/03/2022     08/02/2022     02/03/2022     BMP:   Lab Results   Component Value Date     08/02/2022     02/03/2022    POTASSIUM 4.3 08/02/2022    POTASSIUM 4.5 02/03/2022    CHLORIDE 102 08/02/2022    CHLORIDE 104 02/03/2022    CO2 28 08/02/2022    CO2 25 02/03/2022    BUN 15.4 08/02/2022    BUN 16 02/03/2022    CR 0.81 08/02/2022    CR 0.90 02/03/2022     (H) 08/02/2022     02/03/2022     COAGS: No results found for: PTT, INR, FIBR  POC: No results found for: BGM, HCG, HCGS  HEPATIC:   Lab Results   Component Value Date    ALBUMIN 4.3 02/03/2022    PROTTOTAL 7.2 02/03/2022    ALT 23 02/03/2022    AST 15 02/03/2022    ALKPHOS 69 02/03/2022    BILITOTAL 0.6 02/03/2022     OTHER:   Lab Results   Component Value Date    A1C 7.2 (H) 08/02/2022    VINICIO 9.2 08/02/2022    TSH 0.82 02/03/2022       Anesthesia Plan    ASA Status:  3   NPO Status:  NPO Appropriate    Anesthesia Type: General.     - Airway: ETT   Induction: Intravenous.   Maintenance: Balanced.   Techniques and Equipment:     - Airway: Video-Laryngoscope     - Lines/Monitors: 2nd IV     Consents    Anesthesia Plan(s) and associated risks, benefits, and realistic alternatives discussed. Questions answered and patient/representative(s) expressed understanding.    - Discussed:     - Discussed with:  Patient         Postoperative Care    Pain management: IV analgesics, Multi-modal analgesia.   PONV prophylaxis: Ondansetron (or other 5HT-3), Background Propofol Infusion     Comments:    Other Comments: H&P reviewed    Videoscope  2nd PIV  Check blood glucose            Eleno Riley MD

## 2022-08-16 ENCOUNTER — HOSPITAL ENCOUNTER (OUTPATIENT)
Facility: CLINIC | Age: 57
Discharge: HOME | End: 2022-08-17
Attending: UROLOGY | Admitting: UROLOGY
Payer: COMMERCIAL

## 2022-08-16 ENCOUNTER — ANESTHESIA (OUTPATIENT)
Dept: SURGERY | Facility: CLINIC | Age: 57
End: 2022-08-16
Payer: COMMERCIAL

## 2022-08-16 DIAGNOSIS — C61 PROSTATE CANCER (H): Primary | ICD-10-CM

## 2022-08-16 LAB
ABO/RH(D): NORMAL
ANTIBODY SCREEN: NEGATIVE
CREAT SERPL-MCNC: 0.98 MG/DL (ref 0.66–1.25)
GFR SERPL CREATININE-BSD FRML MDRD: 89 ML/MIN/1.73M2
GLUCOSE BLDC GLUCOMTR-MCNC: 143 MG/DL (ref 70–99)
GLUCOSE BLDC GLUCOMTR-MCNC: 169 MG/DL (ref 70–99)
GLUCOSE BLDC GLUCOMTR-MCNC: 189 MG/DL (ref 70–99)
SPECIMEN EXPIRATION DATE: NORMAL

## 2022-08-16 PROCEDURE — 360N000080 HC SURGERY LEVEL 7, PER MIN: Performed by: UROLOGY

## 2022-08-16 PROCEDURE — 710N000009 HC RECOVERY PHASE 1, LEVEL 1, PER MIN: Performed by: UROLOGY

## 2022-08-16 PROCEDURE — 88309 TISSUE EXAM BY PATHOLOGIST: CPT | Mod: 26 | Performed by: PATHOLOGY

## 2022-08-16 PROCEDURE — 370N000017 HC ANESTHESIA TECHNICAL FEE, PER MIN: Performed by: UROLOGY

## 2022-08-16 PROCEDURE — 250N000013 HC RX MED GY IP 250 OP 250 PS 637: Performed by: PHYSICIAN ASSISTANT

## 2022-08-16 PROCEDURE — 36415 COLL VENOUS BLD VENIPUNCTURE: CPT | Performed by: PHYSICIAN ASSISTANT

## 2022-08-16 PROCEDURE — 999N000141 HC STATISTIC PRE-PROCEDURE NURSING ASSESSMENT: Performed by: UROLOGY

## 2022-08-16 PROCEDURE — 258N000001 HC RX 258: Performed by: UROLOGY

## 2022-08-16 PROCEDURE — 258N000003 HC RX IP 258 OP 636: Performed by: NURSE ANESTHETIST, CERTIFIED REGISTERED

## 2022-08-16 PROCEDURE — 250N000011 HC RX IP 250 OP 636: Performed by: PHYSICIAN ASSISTANT

## 2022-08-16 PROCEDURE — 88307 TISSUE EXAM BY PATHOLOGIST: CPT | Mod: TC | Performed by: UROLOGY

## 2022-08-16 PROCEDURE — 86901 BLOOD TYPING SEROLOGIC RH(D): CPT | Performed by: PHYSICIAN ASSISTANT

## 2022-08-16 PROCEDURE — 258N000003 HC RX IP 258 OP 636: Performed by: PHYSICIAN ASSISTANT

## 2022-08-16 PROCEDURE — 272N000001 HC OR GENERAL SUPPLY STERILE: Performed by: UROLOGY

## 2022-08-16 PROCEDURE — 250N000011 HC RX IP 250 OP 636: Performed by: ANESTHESIOLOGY

## 2022-08-16 PROCEDURE — 250N000025 HC SEVOFLURANE, PER MIN: Performed by: UROLOGY

## 2022-08-16 PROCEDURE — 88309 TISSUE EXAM BY PATHOLOGIST: CPT | Mod: TC | Performed by: UROLOGY

## 2022-08-16 PROCEDURE — 82962 GLUCOSE BLOOD TEST: CPT

## 2022-08-16 PROCEDURE — 88305 TISSUE EXAM BY PATHOLOGIST: CPT | Mod: 26 | Performed by: PATHOLOGY

## 2022-08-16 PROCEDURE — 88304 TISSUE EXAM BY PATHOLOGIST: CPT | Mod: 26 | Performed by: PATHOLOGY

## 2022-08-16 PROCEDURE — 250N000011 HC RX IP 250 OP 636: Performed by: NURSE ANESTHETIST, CERTIFIED REGISTERED

## 2022-08-16 PROCEDURE — 82565 ASSAY OF CREATININE: CPT | Performed by: PHYSICIAN ASSISTANT

## 2022-08-16 PROCEDURE — 250N000009 HC RX 250: Performed by: UROLOGY

## 2022-08-16 PROCEDURE — 258N000003 HC RX IP 258 OP 636: Performed by: ANESTHESIOLOGY

## 2022-08-16 PROCEDURE — 250N000009 HC RX 250: Performed by: NURSE ANESTHETIST, CERTIFIED REGISTERED

## 2022-08-16 RX ORDER — NEOMYCIN/BACITRACIN/POLYMYXINB 3.5-400-5K
OINTMENT (GRAM) TOPICAL 4 TIMES DAILY PRN
Status: DISCONTINUED | OUTPATIENT
Start: 2022-08-16 | End: 2022-08-17 | Stop reason: HOSPADM

## 2022-08-16 RX ORDER — BUPIVACAINE HYDROCHLORIDE 5 MG/ML
INJECTION, SOLUTION PERINEURAL PRN
Status: DISCONTINUED | OUTPATIENT
Start: 2022-08-16 | End: 2022-08-16 | Stop reason: HOSPADM

## 2022-08-16 RX ORDER — ONDANSETRON 4 MG/1
4 TABLET, ORALLY DISINTEGRATING ORAL EVERY 30 MIN PRN
Status: DISCONTINUED | OUTPATIENT
Start: 2022-08-16 | End: 2022-08-16 | Stop reason: HOSPADM

## 2022-08-16 RX ORDER — ONDANSETRON 2 MG/ML
4 INJECTION INTRAMUSCULAR; INTRAVENOUS EVERY 30 MIN PRN
Status: DISCONTINUED | OUTPATIENT
Start: 2022-08-16 | End: 2022-08-16 | Stop reason: HOSPADM

## 2022-08-16 RX ORDER — FENTANYL CITRATE 50 UG/ML
25 INJECTION, SOLUTION INTRAMUSCULAR; INTRAVENOUS EVERY 5 MIN PRN
Status: DISCONTINUED | OUTPATIENT
Start: 2022-08-16 | End: 2022-08-16 | Stop reason: HOSPADM

## 2022-08-16 RX ORDER — ONDANSETRON 2 MG/ML
4 INJECTION INTRAMUSCULAR; INTRAVENOUS EVERY 6 HOURS PRN
Status: DISCONTINUED | OUTPATIENT
Start: 2022-08-16 | End: 2022-08-17 | Stop reason: HOSPADM

## 2022-08-16 RX ORDER — ACETAMINOPHEN 325 MG/1
975 TABLET ORAL EVERY 6 HOURS
Status: DISCONTINUED | OUTPATIENT
Start: 2022-08-16 | End: 2022-08-17 | Stop reason: HOSPADM

## 2022-08-16 RX ORDER — HYDROMORPHONE HCL IN WATER/PF 6 MG/30 ML
0.4 PATIENT CONTROLLED ANALGESIA SYRINGE INTRAVENOUS EVERY 5 MIN PRN
Status: DISCONTINUED | OUTPATIENT
Start: 2022-08-16 | End: 2022-08-16 | Stop reason: HOSPADM

## 2022-08-16 RX ORDER — SODIUM CHLORIDE, SODIUM LACTATE, POTASSIUM CHLORIDE, CALCIUM CHLORIDE 600; 310; 30; 20 MG/100ML; MG/100ML; MG/100ML; MG/100ML
INJECTION, SOLUTION INTRAVENOUS CONTINUOUS PRN
Status: DISCONTINUED | OUTPATIENT
Start: 2022-08-16 | End: 2022-08-16

## 2022-08-16 RX ORDER — OXYCODONE HYDROCHLORIDE 5 MG/1
10 TABLET ORAL EVERY 4 HOURS PRN
Status: DISCONTINUED | OUTPATIENT
Start: 2022-08-16 | End: 2022-08-17 | Stop reason: HOSPADM

## 2022-08-16 RX ORDER — SODIUM CHLORIDE, SODIUM LACTATE, POTASSIUM CHLORIDE, CALCIUM CHLORIDE 600; 310; 30; 20 MG/100ML; MG/100ML; MG/100ML; MG/100ML
INJECTION, SOLUTION INTRAVENOUS CONTINUOUS
Status: DISCONTINUED | OUTPATIENT
Start: 2022-08-16 | End: 2022-08-16 | Stop reason: HOSPADM

## 2022-08-16 RX ORDER — HYDROMORPHONE HCL IN WATER/PF 6 MG/30 ML
0.2 PATIENT CONTROLLED ANALGESIA SYRINGE INTRAVENOUS
Status: DISCONTINUED | OUTPATIENT
Start: 2022-08-16 | End: 2022-08-17 | Stop reason: HOSPADM

## 2022-08-16 RX ORDER — FENTANYL CITRATE 50 UG/ML
INJECTION, SOLUTION INTRAMUSCULAR; INTRAVENOUS PRN
Status: DISCONTINUED | OUTPATIENT
Start: 2022-08-16 | End: 2022-08-16

## 2022-08-16 RX ORDER — ONDANSETRON 2 MG/ML
INJECTION INTRAMUSCULAR; INTRAVENOUS PRN
Status: DISCONTINUED | OUTPATIENT
Start: 2022-08-16 | End: 2022-08-16

## 2022-08-16 RX ORDER — ACETAMINOPHEN 325 MG/1
975 TABLET ORAL ONCE
Status: COMPLETED | OUTPATIENT
Start: 2022-08-16 | End: 2022-08-16

## 2022-08-16 RX ORDER — ONDANSETRON 4 MG/1
4 TABLET, ORALLY DISINTEGRATING ORAL EVERY 6 HOURS PRN
Status: DISCONTINUED | OUTPATIENT
Start: 2022-08-16 | End: 2022-08-17 | Stop reason: HOSPADM

## 2022-08-16 RX ORDER — LIDOCAINE HYDROCHLORIDE 20 MG/ML
INJECTION, SOLUTION INFILTRATION; PERINEURAL PRN
Status: DISCONTINUED | OUTPATIENT
Start: 2022-08-16 | End: 2022-08-16

## 2022-08-16 RX ORDER — NALOXONE HYDROCHLORIDE 0.4 MG/ML
0.4 INJECTION, SOLUTION INTRAMUSCULAR; INTRAVENOUS; SUBCUTANEOUS
Status: DISCONTINUED | OUTPATIENT
Start: 2022-08-16 | End: 2022-08-17 | Stop reason: HOSPADM

## 2022-08-16 RX ORDER — SODIUM CHLORIDE 9 MG/ML
INJECTION, SOLUTION INTRAVENOUS CONTINUOUS
Status: DISCONTINUED | OUTPATIENT
Start: 2022-08-16 | End: 2022-08-17 | Stop reason: HOSPADM

## 2022-08-16 RX ORDER — OXYCODONE HYDROCHLORIDE 5 MG/1
5 TABLET ORAL EVERY 4 HOURS PRN
Status: DISCONTINUED | OUTPATIENT
Start: 2022-08-16 | End: 2022-08-17 | Stop reason: HOSPADM

## 2022-08-16 RX ORDER — PROCHLORPERAZINE MALEATE 10 MG
10 TABLET ORAL EVERY 6 HOURS PRN
Status: DISCONTINUED | OUTPATIENT
Start: 2022-08-16 | End: 2022-08-17 | Stop reason: HOSPADM

## 2022-08-16 RX ORDER — LIDOCAINE 40 MG/G
CREAM TOPICAL
Status: DISCONTINUED | OUTPATIENT
Start: 2022-08-16 | End: 2022-08-17 | Stop reason: HOSPADM

## 2022-08-16 RX ORDER — CEFAZOLIN SODIUM/WATER 3 G/30 ML
3 SYRINGE (ML) INTRAVENOUS
Status: DISCONTINUED | OUTPATIENT
Start: 2022-08-16 | End: 2022-08-16 | Stop reason: HOSPADM

## 2022-08-16 RX ORDER — LISINOPRIL 10 MG/1
10 TABLET ORAL DAILY
Status: DISCONTINUED | OUTPATIENT
Start: 2022-08-17 | End: 2022-08-17 | Stop reason: HOSPADM

## 2022-08-16 RX ORDER — EPHEDRINE SULFATE 50 MG/ML
INJECTION, SOLUTION INTRAMUSCULAR; INTRAVENOUS; SUBCUTANEOUS PRN
Status: DISCONTINUED | OUTPATIENT
Start: 2022-08-16 | End: 2022-08-16

## 2022-08-16 RX ORDER — NALOXONE HYDROCHLORIDE 0.4 MG/ML
0.2 INJECTION, SOLUTION INTRAMUSCULAR; INTRAVENOUS; SUBCUTANEOUS
Status: DISCONTINUED | OUTPATIENT
Start: 2022-08-16 | End: 2022-08-17 | Stop reason: HOSPADM

## 2022-08-16 RX ORDER — AMOXICILLIN 250 MG
1-2 CAPSULE ORAL 2 TIMES DAILY PRN
Status: DISCONTINUED | OUTPATIENT
Start: 2022-08-16 | End: 2022-08-17 | Stop reason: HOSPADM

## 2022-08-16 RX ORDER — LIDOCAINE 50 MG/G
OINTMENT TOPICAL 4 TIMES DAILY PRN
Status: DISCONTINUED | OUTPATIENT
Start: 2022-08-16 | End: 2022-08-17 | Stop reason: HOSPADM

## 2022-08-16 RX ORDER — CEFAZOLIN SODIUM 2 G/100ML
2 INJECTION, SOLUTION INTRAVENOUS EVERY 8 HOURS
Status: COMPLETED | OUTPATIENT
Start: 2022-08-16 | End: 2022-08-17

## 2022-08-16 RX ORDER — PROPOFOL 10 MG/ML
INJECTION, EMULSION INTRAVENOUS PRN
Status: DISCONTINUED | OUTPATIENT
Start: 2022-08-16 | End: 2022-08-16

## 2022-08-16 RX ORDER — OXYCODONE HYDROCHLORIDE 5 MG/1
5 TABLET ORAL EVERY 4 HOURS PRN
Status: DISCONTINUED | OUTPATIENT
Start: 2022-08-16 | End: 2022-08-16 | Stop reason: HOSPADM

## 2022-08-16 RX ORDER — KETOROLAC TROMETHAMINE 15 MG/ML
15 INJECTION, SOLUTION INTRAMUSCULAR; INTRAVENOUS EVERY 6 HOURS
Status: DISCONTINUED | OUTPATIENT
Start: 2022-08-16 | End: 2022-08-17 | Stop reason: HOSPADM

## 2022-08-16 RX ORDER — CEFAZOLIN SODIUM/WATER 3 G/30 ML
3 SYRINGE (ML) INTRAVENOUS SEE ADMIN INSTRUCTIONS
Status: DISCONTINUED | OUTPATIENT
Start: 2022-08-16 | End: 2022-08-16 | Stop reason: HOSPADM

## 2022-08-16 RX ORDER — PROPOFOL 10 MG/ML
INJECTION, EMULSION INTRAVENOUS CONTINUOUS PRN
Status: DISCONTINUED | OUTPATIENT
Start: 2022-08-16 | End: 2022-08-16

## 2022-08-16 RX ORDER — HYDROMORPHONE HCL IN WATER/PF 6 MG/30 ML
0.4 PATIENT CONTROLLED ANALGESIA SYRINGE INTRAVENOUS
Status: DISCONTINUED | OUTPATIENT
Start: 2022-08-16 | End: 2022-08-17 | Stop reason: HOSPADM

## 2022-08-16 RX ORDER — FENTANYL CITRATE 0.05 MG/ML
50 INJECTION, SOLUTION INTRAMUSCULAR; INTRAVENOUS
Status: DISCONTINUED | OUTPATIENT
Start: 2022-08-16 | End: 2022-08-16 | Stop reason: HOSPADM

## 2022-08-16 RX ORDER — MAGNESIUM HYDROXIDE 1200 MG/15ML
LIQUID ORAL PRN
Status: DISCONTINUED | OUTPATIENT
Start: 2022-08-16 | End: 2022-08-16 | Stop reason: HOSPADM

## 2022-08-16 RX ADMIN — ROCURONIUM BROMIDE 50 MG: 50 INJECTION, SOLUTION INTRAVENOUS at 08:45

## 2022-08-16 RX ADMIN — ONDANSETRON 4 MG: 2 INJECTION INTRAMUSCULAR; INTRAVENOUS at 12:58

## 2022-08-16 RX ADMIN — SODIUM CHLORIDE, POTASSIUM CHLORIDE, SODIUM LACTATE AND CALCIUM CHLORIDE: 600; 310; 30; 20 INJECTION, SOLUTION INTRAVENOUS at 10:59

## 2022-08-16 RX ADMIN — ROCURONIUM BROMIDE 10 MG: 50 INJECTION, SOLUTION INTRAVENOUS at 09:53

## 2022-08-16 RX ADMIN — MIDAZOLAM 2 MG: 1 INJECTION INTRAMUSCULAR; INTRAVENOUS at 08:36

## 2022-08-16 RX ADMIN — Medication 3 G: at 08:37

## 2022-08-16 RX ADMIN — ACETAMINOPHEN 975 MG: 325 TABLET ORAL at 18:59

## 2022-08-16 RX ADMIN — LIDOCAINE HYDROCHLORIDE 70 MG: 20 INJECTION, SOLUTION INFILTRATION; PERINEURAL at 08:45

## 2022-08-16 RX ADMIN — PROPOFOL 20 MCG/KG/MIN: 10 INJECTION, EMULSION INTRAVENOUS at 08:46

## 2022-08-16 RX ADMIN — ROCURONIUM BROMIDE 20 MG: 50 INJECTION, SOLUTION INTRAVENOUS at 12:06

## 2022-08-16 RX ADMIN — PHENYLEPHRINE HYDROCHLORIDE 100 MCG: 10 INJECTION INTRAVENOUS at 09:05

## 2022-08-16 RX ADMIN — Medication 10 MG: at 09:27

## 2022-08-16 RX ADMIN — ROCURONIUM BROMIDE 10 MG: 50 INJECTION, SOLUTION INTRAVENOUS at 11:38

## 2022-08-16 RX ADMIN — ROCURONIUM BROMIDE 20 MG: 50 INJECTION, SOLUTION INTRAVENOUS at 09:14

## 2022-08-16 RX ADMIN — HYDROMORPHONE HYDROCHLORIDE 0.4 MG: 0.2 INJECTION, SOLUTION INTRAMUSCULAR; INTRAVENOUS; SUBCUTANEOUS at 14:59

## 2022-08-16 RX ADMIN — HYDROMORPHONE HYDROCHLORIDE 0.5 MG: 1 INJECTION, SOLUTION INTRAMUSCULAR; INTRAVENOUS; SUBCUTANEOUS at 12:56

## 2022-08-16 RX ADMIN — HYDROMORPHONE HYDROCHLORIDE 0.4 MG: 0.2 INJECTION, SOLUTION INTRAMUSCULAR; INTRAVENOUS; SUBCUTANEOUS at 17:18

## 2022-08-16 RX ADMIN — PHENYLEPHRINE HYDROCHLORIDE 100 MCG: 10 INJECTION INTRAVENOUS at 08:51

## 2022-08-16 RX ADMIN — FENTANYL CITRATE 50 MCG: 50 INJECTION, SOLUTION INTRAMUSCULAR; INTRAVENOUS at 10:53

## 2022-08-16 RX ADMIN — SODIUM CHLORIDE: 9 INJECTION, SOLUTION INTRAVENOUS at 17:23

## 2022-08-16 RX ADMIN — FENTANYL CITRATE 50 MCG: 50 INJECTION, SOLUTION INTRAMUSCULAR; INTRAVENOUS at 08:45

## 2022-08-16 RX ADMIN — FENTANYL CITRATE 25 MCG: 50 INJECTION, SOLUTION INTRAMUSCULAR; INTRAVENOUS at 14:07

## 2022-08-16 RX ADMIN — CEFAZOLIN SODIUM 2 G: 2 INJECTION, SOLUTION INTRAVENOUS at 19:45

## 2022-08-16 RX ADMIN — SODIUM CHLORIDE, POTASSIUM CHLORIDE, SODIUM LACTATE AND CALCIUM CHLORIDE: 600; 310; 30; 20 INJECTION, SOLUTION INTRAVENOUS at 08:36

## 2022-08-16 RX ADMIN — PHENYLEPHRINE HYDROCHLORIDE 50 MCG: 10 INJECTION INTRAVENOUS at 10:04

## 2022-08-16 RX ADMIN — ACETAMINOPHEN 975 MG: 325 TABLET ORAL at 07:33

## 2022-08-16 RX ADMIN — FENTANYL CITRATE 50 MCG: 50 INJECTION, SOLUTION INTRAMUSCULAR; INTRAVENOUS at 09:32

## 2022-08-16 RX ADMIN — FENTANYL CITRATE 25 MCG: 50 INJECTION, SOLUTION INTRAMUSCULAR; INTRAVENOUS at 14:39

## 2022-08-16 RX ADMIN — OXYCODONE HYDROCHLORIDE 5 MG: 5 TABLET ORAL at 21:44

## 2022-08-16 RX ADMIN — PROPOFOL 250 MG: 10 INJECTION, EMULSION INTRAVENOUS at 08:45

## 2022-08-16 RX ADMIN — SUGAMMADEX 200 MG: 100 INJECTION, SOLUTION INTRAVENOUS at 13:27

## 2022-08-16 RX ADMIN — KETOROLAC TROMETHAMINE 15 MG: 15 INJECTION, SOLUTION INTRAMUSCULAR; INTRAVENOUS at 17:55

## 2022-08-16 RX ADMIN — ROCURONIUM BROMIDE 10 MG: 50 INJECTION, SOLUTION INTRAVENOUS at 10:03

## 2022-08-16 RX ADMIN — ROCURONIUM BROMIDE 10 MG: 50 INJECTION, SOLUTION INTRAVENOUS at 10:52

## 2022-08-16 RX ADMIN — Medication 3 G: at 12:30

## 2022-08-16 RX ADMIN — FENTANYL CITRATE 50 MCG: 50 INJECTION, SOLUTION INTRAMUSCULAR; INTRAVENOUS at 09:20

## 2022-08-16 RX ADMIN — PHENYLEPHRINE HYDROCHLORIDE 0.5 MCG/KG/MIN: 10 INJECTION INTRAVENOUS at 08:51

## 2022-08-16 RX ADMIN — PHENYLEPHRINE HYDROCHLORIDE 100 MCG: 10 INJECTION INTRAVENOUS at 08:57

## 2022-08-16 RX ADMIN — SODIUM CHLORIDE, POTASSIUM CHLORIDE, SODIUM LACTATE AND CALCIUM CHLORIDE: 600; 310; 30; 20 INJECTION, SOLUTION INTRAVENOUS at 08:55

## 2022-08-16 RX ADMIN — ROCURONIUM BROMIDE 20 MG: 50 INJECTION, SOLUTION INTRAVENOUS at 09:32

## 2022-08-16 RX ADMIN — ROCURONIUM BROMIDE 20 MG: 50 INJECTION, SOLUTION INTRAVENOUS at 10:13

## 2022-08-16 ASSESSMENT — ACTIVITIES OF DAILY LIVING (ADL)
ADLS_ACUITY_SCORE: 35
ADLS_ACUITY_SCORE: 33

## 2022-08-16 NOTE — PROGRESS NOTES
UROLOGY POSTOP CHECK  RESTING COMFORTABLY, PAIN CONTROLLED, V.S.S, OUTPUT GOOD ,INITALLY BLOODY ,BUT IMPROVED. WOUNDS LOOK GOOD. NO SHOULDER PAIN. NO CALF TENDERNESS OR SHORTNESS OF BREATH.   P.E. A BIT SLEEPY, ALERT, APPROPRIATE, GOOD AIR EXCHANGE. WOUND DRY    A- PROSTATE CANCER ,PT2B, G7, G8      S/P NERVE SPARING ROBOTIC PROSTATECTOMY    PLAN - WILL AMBULATE LATER, PAOLA POSTOP PROTOCOL. POSSIBLE HOME IN AM.

## 2022-08-16 NOTE — ANESTHESIA CARE TRANSFER NOTE
Patient: Walter Santa    Procedure: Procedure(s):  NERVE SPARING ROBOTIC ASSISTED PROSTATECTOMY, LEFT PELVIC LYMPH NODE DISSECTION       Diagnosis: Prostate cancer (H) [C61]  Diagnosis Additional Information: No value filed.    Anesthesia Type:   General     Note:    Oropharynx: spontaneously breathing and oropharynx clear of all foreign objects  Level of Consciousness: drowsy  Oxygen Supplementation: face mask  Level of Supplemental Oxygen (L/min / FiO2): 8 LPM  Independent Airway: airway patency satisfactory and stable  Dentition: dentition unchanged  Vital Signs Stable: post-procedure vital signs reviewed and stable  Report to RN Given: handoff report given  Patient transferred to: PACU    Handoff Report: Identifed the Patient, Identified the Reponsible Provider, Reviewed the pertinent medical history, Discussed the surgical course, Reviewed Intra-OP anesthesia mangement and issues during anesthesia, Set expectations for post-procedure period and Allowed opportunity for questions and acknowledgement of understanding      Vitals:  Vitals Value Taken Time   /86 08/16/22 1349   Temp     Pulse 88 08/16/22 1352   Resp 15 08/16/22 1352   SpO2 96 % 08/16/22 1352   Vitals shown include unvalidated device data.    Electronically Signed By: ALEXANDRO Guillory CRNA  August 16, 2022  1:53 PM

## 2022-08-16 NOTE — ANESTHESIA POSTPROCEDURE EVALUATION
Patient: Walter Santa    Procedure: Procedure(s):  NERVE SPARING ROBOTIC ASSISTED PROSTATECTOMY, LEFT PELVIC LYMPH NODE DISSECTION       Anesthesia Type:  General    Note:  Disposition: Inpatient   Postop Pain Control: Uneventful            Sign Out: Well controlled pain   PONV: No   Neuro/Psych: Uneventful            Sign Out: Acceptable/Baseline neuro status   Airway/Respiratory: Uneventful            Sign Out: Acceptable/Baseline resp. status   CV/Hemodynamics: Uneventful            Sign Out: Acceptable CV status; No obvious hypovolemia; No obvious fluid overload   Other NRE: NONE   DID A NON-ROUTINE EVENT OCCUR? No           Last vitals:  Vitals Value Taken Time   /86 08/16/22 1530   Temp 36.3  C (97.3  F) 08/16/22 1430   Pulse 110 08/16/22 1545   Resp 21 08/16/22 1545   SpO2 96 % 08/16/22 1545   Vitals shown include unvalidated device data.    Electronically Signed By: Eleno Riley MD  August 16, 2022  3:46 PM

## 2022-08-17 VITALS
HEIGHT: 77 IN | TEMPERATURE: 98.1 F | OXYGEN SATURATION: 94 % | DIASTOLIC BLOOD PRESSURE: 83 MMHG | BODY MASS INDEX: 36.35 KG/M2 | WEIGHT: 307.9 LBS | HEART RATE: 99 BPM | RESPIRATION RATE: 16 BRPM | SYSTOLIC BLOOD PRESSURE: 125 MMHG

## 2022-08-17 LAB
ANION GAP SERPL CALCULATED.3IONS-SCNC: 6 MMOL/L (ref 3–14)
BUN SERPL-MCNC: 10 MG/DL (ref 7–30)
CALCIUM SERPL-MCNC: 8.5 MG/DL (ref 8.5–10.1)
CHLORIDE BLD-SCNC: 107 MMOL/L (ref 94–109)
CO2 SERPL-SCNC: 25 MMOL/L (ref 20–32)
CREAT SERPL-MCNC: 0.89 MG/DL (ref 0.66–1.25)
GFR SERPL CREATININE-BSD FRML MDRD: >90 ML/MIN/1.73M2
GLUCOSE BLD-MCNC: 169 MG/DL (ref 70–99)
GLUCOSE BLDC GLUCOMTR-MCNC: 153 MG/DL (ref 70–99)
HGB BLD-MCNC: 13.9 G/DL (ref 13.3–17.7)
POTASSIUM BLD-SCNC: 3.5 MMOL/L (ref 3.4–5.3)
SODIUM SERPL-SCNC: 138 MMOL/L (ref 133–144)

## 2022-08-17 PROCEDURE — 258N000003 HC RX IP 258 OP 636: Performed by: PHYSICIAN ASSISTANT

## 2022-08-17 PROCEDURE — 85018 HEMOGLOBIN: CPT | Performed by: PHYSICIAN ASSISTANT

## 2022-08-17 PROCEDURE — 250N000011 HC RX IP 250 OP 636: Performed by: PHYSICIAN ASSISTANT

## 2022-08-17 PROCEDURE — 82962 GLUCOSE BLOOD TEST: CPT

## 2022-08-17 PROCEDURE — 250N000013 HC RX MED GY IP 250 OP 250 PS 637: Performed by: PHYSICIAN ASSISTANT

## 2022-08-17 PROCEDURE — 80048 BASIC METABOLIC PNL TOTAL CA: CPT | Performed by: PHYSICIAN ASSISTANT

## 2022-08-17 PROCEDURE — 36415 COLL VENOUS BLD VENIPUNCTURE: CPT | Performed by: PHYSICIAN ASSISTANT

## 2022-08-17 RX ORDER — AMOXICILLIN 250 MG
1-2 CAPSULE ORAL 2 TIMES DAILY PRN
Qty: 30 TABLET | Refills: 0 | Status: SHIPPED | OUTPATIENT
Start: 2022-08-17 | End: 2022-11-08

## 2022-08-17 RX ORDER — CEPHALEXIN 500 MG/1
500 CAPSULE ORAL 2 TIMES DAILY
Qty: 14 CAPSULE | Refills: 0 | Status: SHIPPED | OUTPATIENT
Start: 2022-08-17 | End: 2022-11-08

## 2022-08-17 RX ORDER — ATROPA BELLADONNA AND OPIUM 16.2; 3 MG/1; MG/1
15 SUPPOSITORY RECTAL EVERY 6 HOURS PRN
Status: DISCONTINUED | OUTPATIENT
Start: 2022-08-17 | End: 2022-08-17 | Stop reason: HOSPADM

## 2022-08-17 RX ORDER — TRAMADOL HYDROCHLORIDE 50 MG/1
25-50 TABLET ORAL EVERY 6 HOURS PRN
Qty: 15 TABLET | Refills: 0 | Status: SHIPPED | OUTPATIENT
Start: 2022-08-17 | End: 2022-08-23

## 2022-08-17 RX ADMIN — KETOROLAC TROMETHAMINE 15 MG: 15 INJECTION, SOLUTION INTRAMUSCULAR; INTRAVENOUS at 13:02

## 2022-08-17 RX ADMIN — LISINOPRIL 10 MG: 10 TABLET ORAL at 08:35

## 2022-08-17 RX ADMIN — ACETAMINOPHEN 975 MG: 325 TABLET ORAL at 06:33

## 2022-08-17 RX ADMIN — CEFAZOLIN SODIUM 2 G: 2 INJECTION, SOLUTION INTRAVENOUS at 05:11

## 2022-08-17 RX ADMIN — SODIUM CHLORIDE: 9 INJECTION, SOLUTION INTRAVENOUS at 00:52

## 2022-08-17 RX ADMIN — KETOROLAC TROMETHAMINE 15 MG: 15 INJECTION, SOLUTION INTRAMUSCULAR; INTRAVENOUS at 06:33

## 2022-08-17 RX ADMIN — ACETAMINOPHEN 975 MG: 325 TABLET ORAL at 13:02

## 2022-08-17 RX ADMIN — CEFAZOLIN SODIUM 2 G: 2 INJECTION, SOLUTION INTRAVENOUS at 12:54

## 2022-08-17 RX ADMIN — ACETAMINOPHEN 975 MG: 325 TABLET ORAL at 00:50

## 2022-08-17 RX ADMIN — KETOROLAC TROMETHAMINE 15 MG: 15 INJECTION, SOLUTION INTRAMUSCULAR; INTRAVENOUS at 00:50

## 2022-08-17 RX ADMIN — METFORMIN HYDROCHLORIDE 1000 MG: 500 TABLET ORAL at 08:35

## 2022-08-17 ASSESSMENT — ACTIVITIES OF DAILY LIVING (ADL)
ADLS_ACUITY_SCORE: 31
ADLS_ACUITY_SCORE: 35
ADLS_ACUITY_SCORE: 35
ADLS_ACUITY_SCORE: 31
ADLS_ACUITY_SCORE: 35

## 2022-08-17 NOTE — PROGRESS NOTES
[facility]    Urology Progress Note  Date of Service: 08/17/2022      Interval History   Tolerating oral intake. Ambulating. Pain when going from laying to sitting. Tolerating lund, outputs clear yellow.     AVSS.  Creatinine: 0.89  Hemoglobin: 13.9    Lund output: 750/1400    Assessment & Plan   Walter Santa is a 57 year old male who is POD 1, ralp with Dr. Bhatia and Dr. Gallego     - Vanessa for discharge home.    - Rn to teach lund cares, provide with leg bag.    - Antibiotics and pain medications sent to his requested pharmacy.    - Sent staff message to Kasandra to help schedule for ct cystogram and lund removal next Friday with Dr Gallego    - Dr. Gallego will call with pathology    - Discussed post op cares, lifting and activity restrictions with patient today.    Physical Exam   Temp:  [96.8  F (36  C)-98.8  F (37.1  C)] 98.1  F (36.7  C)  Pulse:  [] 99  Resp:  [14-24] 16  BP: (117-143)/(63-86) 125/83  SpO2:  [94 %-100 %] 94 %    Weights:   Vitals:    08/16/22 0745   Weight: 139.7 kg (307 lb 14.4 oz)    Body mass index is 36.51 kg/m .    Constitutional: Alert. Pleasant. No acute distress.   Respiratory: No respiratory distress. Breathing unlabored.   GI: Soft, nontender.  Male :uncircumsized, lund in place with clear yellow ouptut  Skin: Warm and dry.  Musculoskeletal: Moving all extremities approprietly.    Data   Recent Labs   Lab 08/17/22  0644 08/17/22  0622 08/16/22  1815 08/16/22  1402   HGB 13.9  --   --   --      --   --   --    POTASSIUM 3.5  --   --   --    CHLORIDE 107  --   --   --    CO2 25  --   --   --    BUN 10  --   --   --    CR 0.89  --  0.98  --    ANIONGAP 6  --   --   --    VINICIO 8.5  --   --   --    * 153*  --  189*       Recent Labs   Lab 08/17/22  0644 08/17/22  0622 08/16/22  1402 08/16/22  1217 08/16/22  0907   * 153* 189* 169* 143*        Unresulted Labs Ordered in the Past 30 Days of this Admission     Date and Time  Order Name Status Description    8/16/2022 10:33 AM Surgical Pathology Exam In process            Imaging  No results found for this or any previous visit (from the past 24 hour(s)).       I have discussed patient with attending physician, Dr. Gallego, Urology.   Pretty Teixeira PA-C on 8/17/2022 at 12:36 PM   [facility]

## 2022-08-17 NOTE — PLAN OF CARE
Goal Outcome Evaluation:           Pt POD 1. Pain managed throughout the day with scheduled Tylenol and Ketorolac, rated 1-2, only exasperated by movement, but still tolerable. Denied N&V, tolerated regular diet. Pt up independently. Pt reported small liquid bowel movement in the AM, without pain or discomfort. Lund in place without complication, no complaint of pain with lund. Education completed on Lund home care with pt and wife. RN emphasized lund cares and the importance of regular cleaning of the penis and catheter. Discharge teaching completed with pt and his spouse. All questions were satisfied. Discharged to home.

## 2022-08-17 NOTE — PLAN OF CARE
Problem: POD 0 nerve sparing robotic prostatectomy   Hx: DM II, HTN  Vitals: VSS on RA  Orientation/Neuro: A/Ox4  Activity Level: A1  Diet: Reg, only drank clears tonight, no appetite  GI: Slight nausea when pt got up, sips of ice water and deep breathing; effective  : Gates withg adequate frank output  IV Fluids/Drains/Tubes: L PIV infusing, R PIV SL  Incisions/Dressings: 6 lap sites MAGDA, ice applied to ABD  Pain Management: Pain managed with IV Dilaudid x1, Tylenol, Toradol, and Oxycodone   Plan: Continue to monitor, potential discharge tomorrow

## 2022-08-17 NOTE — PROGRESS NOTES
UROLOGY BRIEF NOTE  RESTING COMFORTABLY, V.S.S., OUTPUT GOOD AND CLEAR. PAIN CONTROLLED, MOVING WELL.NO SOB, NO CALF TENDERNESS. JSOE A DIET. HB 13.8,LYTES OK. PATH PENDING  P.E. ALERT, NAD, GOOD AIR EXCHANGE,ABD SOFT, WOUNDS LOOK GREAT NO CALF TENDERNESS    A- PROSTATE CA G7, G8, STAGE PT2B-LEFT,PSA 15       S/P NERVE SPARING RARP, LEFT LND    PLAN P.A. TO SEE AND DISCHARGE LATER. ADAT. KEFLEX 500MG BID X 1 WK, TRAMADOL 50 MG Q6H PRN, ANN CARES.SEE DR HOLBROOK IN 2 WKS ANSD SCHEDULE C.T. CYSTOGRAM FOR POSSIBLE CATH REMOVAL. LIMITED ACTIVITY, MAY SHOWER. WILL CALL PATH WHEN AVAILABLE AND WILL DISCUSS AT FOLLOWUP APPT.

## 2022-08-17 NOTE — PLAN OF CARE
Goal Outcome Evaluation:  DATE & TIME: 8/16/22 5828 - 3046  Problem: POD 0 robotic prostatectomy, L pelvic lymphadenectomy  Hx:  DM 2, HTN  ORIENTATION/NEURO: A/Ox4  ABNL VS/O2: VSS on RA  MOBILITY: Ax1  PAIN MANAGMENT: Denies N/V, pain managed with scheduled tylenol and Tordol, prn dilaudid and oxy available- pt refused  DIET: Regular - poor appetite, needs encouragement with fluids and food  BOWEL/BLADDER: Continent, Gates catheter patent, dark urine, reports hasn't passed any gas yet. Bowel sounds hypoactive  ABNL LAB/BG:   IV FLUIDS/DRAIN/TUBES/DEVICES:  L PIV infusing NS @100 ml/hr. Can saline lock when adequate po. R PIV SL.  SKIN: 6 lap sites, open to air, approximated, ecchymosis noted.   TESTS/PROCEDURES:   OTHER: Pt reports hasn't passed any gas, bowel sounds hypoactive, previous shift reported pt dangled and stood at edge of bed but felt dizzy. Poor appetite, taking in crackers with medications. Pt reports not feeling hungry. Encouraged ambulation today.   D/C DATE: Discharge pending.

## 2022-08-18 LAB
PATH REPORT.COMMENTS IMP SPEC: ABNORMAL
PATH REPORT.COMMENTS IMP SPEC: ABNORMAL
PATH REPORT.COMMENTS IMP SPEC: YES
PATH REPORT.FINAL DX SPEC: ABNORMAL
PATH REPORT.GROSS SPEC: ABNORMAL
PATH REPORT.MICROSCOPIC SPEC OTHER STN: ABNORMAL
PATH REPORT.RELEVANT HX SPEC: ABNORMAL
PATHOLOGY SYNOPTIC REPORT: ABNORMAL
PHOTO IMAGE: ABNORMAL

## 2022-08-26 ENCOUNTER — TRANSFERRED RECORDS (OUTPATIENT)
Dept: HEALTH INFORMATION MANAGEMENT | Facility: CLINIC | Age: 57
End: 2022-08-26

## 2022-09-13 ENCOUNTER — TRANSFERRED RECORDS (OUTPATIENT)
Dept: HEALTH INFORMATION MANAGEMENT | Facility: CLINIC | Age: 57
End: 2022-09-13

## 2022-10-29 ENCOUNTER — HEALTH MAINTENANCE LETTER (OUTPATIENT)
Age: 57
End: 2022-10-29

## 2022-11-08 ENCOUNTER — OFFICE VISIT (OUTPATIENT)
Dept: INTERNAL MEDICINE | Facility: CLINIC | Age: 57
End: 2022-11-08
Payer: COMMERCIAL

## 2022-11-08 VITALS
DIASTOLIC BLOOD PRESSURE: 72 MMHG | WEIGHT: 310 LBS | TEMPERATURE: 97.8 F | HEIGHT: 77 IN | OXYGEN SATURATION: 98 % | SYSTOLIC BLOOD PRESSURE: 120 MMHG | HEART RATE: 80 BPM | BODY MASS INDEX: 36.6 KG/M2

## 2022-11-08 DIAGNOSIS — Z90.79 S/P PROSTATECTOMY: ICD-10-CM

## 2022-11-08 DIAGNOSIS — C61 PROSTATE CANCER (H): ICD-10-CM

## 2022-11-08 DIAGNOSIS — E66.9 DIABETES MELLITUS TYPE 2 IN OBESE: Primary | ICD-10-CM

## 2022-11-08 DIAGNOSIS — E11.69 DIABETES MELLITUS TYPE 2 IN OBESE: Primary | ICD-10-CM

## 2022-11-08 LAB — HBA1C MFR BLD: 7.2 % (ref 0–5.6)

## 2022-11-08 PROCEDURE — 36415 COLL VENOUS BLD VENIPUNCTURE: CPT | Performed by: INTERNAL MEDICINE

## 2022-11-08 PROCEDURE — 99213 OFFICE O/P EST LOW 20 MIN: CPT | Performed by: INTERNAL MEDICINE

## 2022-11-08 PROCEDURE — 83036 HEMOGLOBIN GLYCOSYLATED A1C: CPT | Performed by: INTERNAL MEDICINE

## 2022-11-08 NOTE — PATIENT INSTRUCTIONS
Follow-up multiple issues, Walter overall is doing pretty well after having his prostatectomy surgery August 16, 2022.    Walter general medical conditions are stable.  We will have him stop by the laboratory this morning for A1c to monitor diabetes.  Last A1c was 7.2 measured August 2022    For this ramin and winter season of 2022, I asked Walter to focus on healthy eating, less overall calories, stay physically active, try to trim off 10-15 pounds over the next 6 months.    Elmer is already had his seasonal flu shot, and he still contemplating whether to get the bivalent COVID-19 booster    Prostate cancer, status post radical prostatectomy and lymph node dissection August 16, 2022  Post procedure incontinence, Walter is using an absorptive undergarment  Erections have not come back yet  He is going to have a follow-up visit with Dr. Gallego, might possibly consider going on tadalafil    8-  NERVE SPARING ROBOTIC ASSISTED PROSTATECTOMY, LEFT PELVIC LYMPH NODE DISSECTION with Catalino Gallego MD  Walter's diagnosis started with an elevated PSA of about 15 from May 5, 2022.      Type 2 diabetes, has been on long-term metformin.  He told me he is due for an eye exam, he goes to [facility]  metFORMIN (GLUCOPHAGE) 1000 MG tablet        TAKE 1 TABLET BY MOUTH TWICE DAILY WITH MEALS  8-2-2022  Hemoglobin A1C 0.0 - 5.6 % 7.2 High      Might possibly have some early mild diabetic neuropathy symptoms, with some tingling and numbness in his toes    Arthroscopic right knee surgery to repair torn meniscus 2/10/2022 [facility]    Hyperlipidemia, on atorvastatin, no history of cardiovascular events     Obesity with body mass index of 37, hopefully after he gets his knee fixed, he'll be able to be even more physically active, but of course also needs to work on dietary discipline.  Wt Readings from Last 5 Encounters:   11/08/22 140.6 kg (310 lb)   08/16/22 139.7 kg (307 lb 14.4 oz)   08/02/22 144.7 kg (319 lb)    05/05/22 142 kg (313 lb)   02/03/22 143 kg (315 lb 3.2 oz)     Kidney stone, passed 1-7-2022 1-5-2022  CT Abdomen Pelvis w Contrast  IMPRESSION: 5 mm calculus at the left ureterovesical junction causes mild left hydronephrosis and hydroureter with a delayed nephrogram and perinephric infiltration.     Fully vaccinated and boosted for COVID-19, received his third shot of Pfizer October 2021. [facility] told him to bring his vaccination card with him, and apparently they're not requiring him to be tested.     Hypertension   lisinopril (PRINIVIL,ZESTRIL) 10 MG tablet    Take 1 tablet (10 mg total) by mouth daily.    90     BP Readings from Last 6 Encounters:   11/08/22 120/72   08/17/22 125/83   08/02/22 132/80   05/05/22 124/83   02/03/22 129/89   01/05/22 (!) 130/92     Abnormal ECG, inferior infarct pattern seen since 2017, right bundle branch block present August 2, 2022  walks 10K steps a day  Non-smoker  No history cardiac events     8-2-2022  Sinus rhythm   Right bundle branch block   Inferior infarct (cited on or before 20-NOV-2017)   Abnormal ECG   When compared with ECG of 05-JAN-2022 05:23,   Right bundle branch block is now Present   Minimal criteria for Anterior infarct are no longer Present     Hyperlipidemia  atorvastatin (LIPITOR) 20 MG tablet    Take 1 tablet (20 mg total) by mouth daily.     5-5-2022  LDL Cholesterol Calculated <=129 mg/dL 34       Direct Measure HDL >=40 mg/dL 36 Low       Triglycerides <=149 mg/dL 90       Cholesterol <=199 mg/dL 88      Kidney stone, passed 1-7-2022 1-5-2022  CT Abdomen Pelvis w Contrast  IMPRESSION: 5 mm calculus at the left ureterovesical junction causes mild left hydronephrosis and hydroureter with a delayed nephrogram and perinephric infiltration.     Suspected sleep apnea, mild  He told me that he might wake up 2 or 3 times during the night, sometimes to urinate, but then he goes back to sleep and then when he finally gets out of bed he feels  reasonably refreshed and is not bothered by daytime sleepiness     Colonoscopy 8/1/2013 was normal, recheck 10 years would correspond to 2023     Family history of cerebral aneurysm (mother) and abdominal aortic aneurysm (grandfather)   I told Walter he does not need to worry about these for himself, because he is aorta was included in a CT abdomen January 5, 2022, and he had a magnetic resonance angiogram of the brain with [facility] December 2013    History of shingles in an unusual location (palm of his right hand), occurred while he was in [place] February 2019     COVID shot #4 Pfizer May 5, 2022  Walter already got his seasonal flu vaccine for autumn 2022  I told him he could consider getting the shingles vaccine (optional)    Immunization History   Administered Date(s) Administered    COVID-19,PF,Pfizer (12+ Yrs) 12/21/2020, 01/08/2021, 10/18/2021    COVID-19,PF,Pfizer 12+ Yrs (2022 and After) 05/05/2022    Influenza Quad, Recombinant, pf(RIV4) (Flublok) 09/16/2021, 09/26/2022    Influenza Vaccine, 6+MO IM (QUADRIVALENT W/PRESERVATIVES) 09/22/2009    Pneumococcal 23 valent 03/10/2009    TD (ADULT, 7+) 11/20/2017    Td,adult,historic,unspecified 01/06/2000    Tdap (Adacel,Boostrix) 03/10/2008

## 2022-11-08 NOTE — PROGRESS NOTES
Office Visit - Follow Up   Walter Santa   57 year old male    Date of Visit: 11/8/2022    Chief Complaint   Patient presents with     Follow Up     Diabetes     Pain     Dull pain in abdomen since yesterday        -------------------------------------------------------------------------------------------------------------------------  Assessment and Plan    Follow-up multiple issues, Walter overall is doing pretty well after having his prostatectomy surgery August 16, 2022.    Walter general medical conditions are stable.  We will have him stop by the laboratory this morning for A1c to monitor diabetes.  Last A1c was 7.2 measured August 2022    For this ramin and winter season of 2022, I asked Walter to focus on healthy eating, less overall calories, stay physically active, try to trim off 10-15 pounds over the next 6 months.    Elmer is already had his seasonal flu shot, and he still contemplating whether to get the bivalent COVID-19 booster    Prostate cancer, status post radical prostatectomy and lymph node dissection August 16, 2022  Post procedure incontinence, Walter is using an absorptive undergarment  Erections have not come back yet  He is going to have a follow-up visit with Dr. Gallego, might possibly consider going on tadalafil    8-  NERVE SPARING ROBOTIC ASSISTED PROSTATECTOMY, LEFT PELVIC LYMPH NODE DISSECTION with Catalino Gallego MD  Walter's diagnosis started with an elevated PSA of about 15 from May 5, 2022.      Type 2 diabetes, has been on long-term metformin.  He told me he is due for an eye exam, he goes to [facility]  metFORMIN (GLUCOPHAGE) 1000 MG tablet        TAKE 1 TABLET BY MOUTH TWICE DAILY WITH MEALS  8-2-2022  Hemoglobin A1C 0.0 - 5.6 % 7.2 High      Might possibly have some early mild diabetic neuropathy symptoms, with some tingling and numbness in his toes    Arthroscopic right knee surgery to repair torn meniscus 2/10/2022 done at [facility], on  atorvastatin, no history of cardiovascular events     Obesity with body mass index of 37, hopefully after he gets his knee fixed, he'll be able to be even more physically active, but of course also needs to work on dietary discipline.  Wt Readings from Last 5 Encounters:   11/08/22 140.6 kg (310 lb)   08/16/22 139.7 kg (307 lb 14.4 oz)   08/02/22 144.7 kg (319 lb)   05/05/22 142 kg (313 lb)   02/03/22 143 kg (315 lb 3.2 oz)     Kidney stone, passed 1-7-2022 1-5-2022  CT Abdomen Pelvis w Contrast  IMPRESSION: 5 mm calculus at the left ureterovesical junction causes mild left hydronephrosis and hydroureter with a delayed nephrogram and perinephric infiltration.     Fully vaccinated and boosted for COVID-19, received his third shot of Pfizer October 2021. [facility] told him to bring his vaccination card with him, and apparently they're not requiring him to be tested.     Hypertension   lisinopril (PRINIVIL,ZESTRIL) 10 MG tablet    Take 1 tablet (10 mg total) by mouth daily.    90     BP Readings from Last 6 Encounters:   11/08/22 120/72   08/17/22 125/83   08/02/22 132/80   05/05/22 124/83   02/03/22 129/89   01/05/22 (!) 130/92     Abnormal ECG, inferior infarct pattern seen since 2017, right bundle branch block present August 2, 2022  walks 10K steps a day  Non-smoker  No history cardiac events     8-2-2022  Sinus rhythm   Right bundle branch block   Inferior infarct (cited on or before 20-NOV-2017)   Abnormal ECG   When compared with ECG of 05-JAN-2022 05:23,   Right bundle branch block is now Present   Minimal criteria for Anterior infarct are no longer Present     Hyperlipidemia  atorvastatin (LIPITOR) 20 MG tablet    Take 1 tablet (20 mg total) by mouth daily.     5-5-2022  LDL Cholesterol Calculated <=129 mg/dL 34       Direct Measure HDL >=40 mg/dL 36 Low       Triglycerides <=149 mg/dL 90       Cholesterol <=199 mg/dL 88      Kidney stone, passed 1-7-2022 1-5-2022  CT Abdomen Pelvis w  Contrast  IMPRESSION: 5 mm calculus at the left ureterovesical junction causes mild left hydronephrosis and hydroureter with a delayed nephrogram and perinephric infiltration.     Suspected sleep apnea, mild  He told me that he might wake up 2 or 3 times during the night, sometimes to urinate, but then he goes back to sleep and then when he finally gets out of bed he feels reasonably refreshed and is not bothered by daytime sleepiness     Colonoscopy 8/1/2013 was normal, recheck 10 years would correspond to 2023     Family history of cerebral aneurysm (mother) and abdominal aortic aneurysm (grandfather)   I told Walter he does not need to worry about these for himself, because he is aorta was included in a CT abdomen January 5, 2022, and he had a magnetic resonance angiogram of the brain with [facility] December 2013    History of shingles in an unusual location (palm of his right hand), occurred while he was in [place] February 2019     COVID shot #4 Pfizer May 5, 2022  Walter already got his seasonal flu vaccine for autumn 2022  I told him he could consider getting the shingles vaccine (optional)    Immunization History   Administered Date(s) Administered     COVID-19,PF,Pfizer (12+ Yrs) 12/21/2020, 01/08/2021, 10/18/2021     COVID-19,PF,Pfizer 12+ Yrs (2022 and After) 05/05/2022     Influenza Quad, Recombinant, pf(RIV4) (Flublok) 09/16/2021, 09/26/2022     Influenza Vaccine, 6+MO IM (QUADRIVALENT W/PRESERVATIVES) 09/22/2009     Pneumococcal 23 valent 03/10/2009     TD (ADULT, 7+) 11/20/2017     Td,adult,historic,unspecified 01/06/2000     Tdap (Adacel,Boostrix) 03/10/2008     --------------------------------------------------------------------------------------------------------------------------  History of Present Illness  This 57 year old old     Follow-up multiple issues, Walter overall is doing pretty well after having his prostatectomy surgery August 16, 2022.    Walter general medical conditions are  stable.  We will have him stop by the laboratory this morning for A1c to monitor diabetes.  Last A1c was 7.2 measured August 2022    For this ramin and winter season of 2022, I asked Walter to focus on healthy eating, less overall calories, stay physically active, try to trim off 10-15 pounds over the next 6 months.    Elmer is already had his seasonal flu shot, and he still contemplating whether to get the bivalent COVID-19 booster    Prostate cancer, status post radical prostatectomy and lymph node dissection August 16, 2022  Post procedure incontinence, Walter is using an absorptive undergarment  Erections have not come back yet  He is going to have a follow-up visit with Dr. Gallego, might possibly consider going on tadalafil    8-  NERVE SPARING ROBOTIC ASSISTED PROSTATECTOMY, LEFT PELVIC LYMPH NODE DISSECTION with Catalino Gallego MD  Walter's diagnosis started with an elevated PSA of about 15 from May 5, 2022.      Type 2 diabetes, has been on long-term metformin.  He told me he is due for an eye exam, he goes to [facility]  metFORMIN (GLUCOPHAGE) 1000 MG tablet        TAKE 1 TABLET BY MOUTH TWICE DAILY WITH MEALS  8-2-2022  Hemoglobin A1C 0.0 - 5.6 % 7.2 High        Wt Readings from Last 3 Encounters:   11/08/22 140.6 kg (310 lb)   08/16/22 139.7 kg (307 lb 14.4 oz)   08/02/22 144.7 kg (319 lb)     BP Readings from Last 3 Encounters:   11/08/22 120/72   08/17/22 125/83   08/02/22 132/80     ---------------------------------------------------------------------------------------------------------------------------    Medications, Allergies, Social, and Problem List   Current Outpatient Medications   Medication Sig Dispense Refill     atorvastatin (LIPITOR) 20 MG tablet Take 1 tablet (20 mg) by mouth daily 90 tablet 3     cetirizine (ZYRTEC) 10 MG tablet Take 10 mg by mouth daily as needed for allergies       lisinopril (ZESTRIL) 5 MG tablet Take 2 tablets (10 mg) by mouth daily 180 tablet 3      "metFORMIN (GLUCOPHAGE) 1000 MG tablet TAKE 1 TABLET BY MOUTH TWICE DAILY WITH MEALS. 180 tablet 3     No Known Allergies  Social History     Tobacco Use     Smoking status: Never     Smokeless tobacco: Never     Patient Active Problem List   Diagnosis     Hyperlipidemia LDL goal <100     Diabetes mellitus type 2 in obese (H)     Attention Deficit Disorder Without Hyperactivity     Obesity (BMI 35.0-39.9) with comorbidity (H)     Essential hypertension with goal blood pressure less than 140/90     Abnormal electrocardiogram     Prostate cancer (H)        Reviewed, reconciled and updated       Physical Exam   General Appearance:       /72 (BP Location: Right arm, Patient Position: Sitting)   Pulse 80   Temp 97.8  F (36.6  C)   Ht 1.956 m (6' 5.01\")   Wt 140.6 kg (310 lb)   SpO2 98%   BMI 36.75 kg/m      Walter appears well today, his four laparoscopic port sites seem nicely healed  Abdomen nontender  No signs of any hernias     Additional Information   I spent 20 minutes on this encounter, including reviewing interval history since last visit, examining the patient, explaining and counseling the issues enumerated in the Assessment and Plan (patient given a copy), ordering indicated tests       TRISTON CALVIN MD, MD          "

## 2022-11-15 ENCOUNTER — TRANSFERRED RECORDS (OUTPATIENT)
Dept: HEALTH INFORMATION MANAGEMENT | Facility: CLINIC | Age: 57
End: 2022-11-15

## 2023-01-10 DIAGNOSIS — E66.9 DIABETES MELLITUS TYPE 2 IN OBESE: ICD-10-CM

## 2023-01-10 DIAGNOSIS — E11.69 DIABETES MELLITUS TYPE 2 IN OBESE: ICD-10-CM

## 2023-01-10 DIAGNOSIS — I10 ESSENTIAL HYPERTENSION WITH GOAL BLOOD PRESSURE LESS THAN 140/90: ICD-10-CM

## 2023-01-11 DIAGNOSIS — E11.69 DIABETES MELLITUS TYPE 2 IN OBESE: ICD-10-CM

## 2023-01-11 DIAGNOSIS — I10 ESSENTIAL HYPERTENSION WITH GOAL BLOOD PRESSURE LESS THAN 140/90: ICD-10-CM

## 2023-01-11 DIAGNOSIS — E66.9 DIABETES MELLITUS TYPE 2 IN OBESE: ICD-10-CM

## 2023-01-12 RX ORDER — ATORVASTATIN CALCIUM 20 MG/1
20 TABLET, FILM COATED ORAL DAILY
Qty: 90 TABLET | Refills: 1 | Status: SHIPPED | OUTPATIENT
Start: 2023-01-12 | End: 2023-07-06

## 2023-01-12 RX ORDER — LISINOPRIL 5 MG/1
10 TABLET ORAL DAILY
Qty: 180 TABLET | Refills: 2 | Status: SHIPPED | OUTPATIENT
Start: 2023-01-12 | End: 2023-05-09

## 2023-01-12 NOTE — TELEPHONE ENCOUNTER
"Last Written Prescription Date:  12/27/21  Last Fill Quantity: 90/180,  # refills: 3   Last office visit provider:  11/8/22     Requested Prescriptions   Pending Prescriptions Disp Refills     atorvastatin (LIPITOR) 20 MG tablet 90 tablet 3     Sig: Take 1 tablet (20 mg) by mouth daily       Statins Protocol Passed - 1/12/2023  7:10 AM        Passed - LDL on file in past 12 months     Recent Labs   Lab Test 05/05/22  0809   LDL 34             Passed - No abnormal creatine kinase in past 12 months     No lab results found.             Passed - Recent (12 mo) or future (30 days) visit within the authorizing provider's specialty     Patient has had an office visit with the authorizing provider or a provider within the authorizing providers department within the previous 12 mos or has a future within next 30 days. See \"Patient Info\" tab in inbasket, or \"Choose Columns\" in Meds & Orders section of the refill encounter.              Passed - Medication is active on med list        Passed - Patient is age 18 or older           lisinopril (ZESTRIL) 5 MG tablet 180 tablet 3     Sig: Take 2 tablets (10 mg) by mouth daily       ACE Inhibitors (Including Combos) Protocol Passed - 1/12/2023  7:10 AM        Passed - Blood pressure under 140/90 in past 12 months     BP Readings from Last 3 Encounters:   11/08/22 120/72   08/17/22 125/83   08/02/22 132/80                 Passed - Recent (12 mo) or future (30 days) visit within the authorizing provider's specialty     Patient has had an office visit with the authorizing provider or a provider within the authorizing providers department within the previous 12 mos or has a future within next 30 days. See \"Patient Info\" tab in inbasket, or \"Choose Columns\" in Meds & Orders section of the refill encounter.              Passed - Medication is active on med list        Passed - Patient is age 18 or older        Passed - Normal serum creatinine on file in past 12 months     Recent Labs " "  Lab Test 08/17/22  0644   CR 0.89       Ok to refill medication if creatinine is low          Passed - Normal serum potassium on file in past 12 months     Recent Labs   Lab Test 08/17/22  0644   POTASSIUM 3.5                metFORMIN (GLUCOPHAGE) 1000 MG tablet 180 tablet 3     Sig: TAKE 1 TABLET BY MOUTH TWICE DAILY WITH MEALS.       Biguanide Agents Passed - 1/10/2023  2:50 PM        Passed - Patient is age 10 or older        Passed - Patient has documented A1c within the specified period of time.     If HgbA1C is 8 or greater, it needs to be on file within the past 3 months.  If less than 8, must be on file within the past 6 months.     Recent Labs   Lab Test 11/08/22 0822   A1C 7.2*             Passed - Patient's CR is NOT>1.4 OR Patient's EGFR is NOT<45 within past 12 mos.     Recent Labs   Lab Test 08/17/22  0644 01/05/22  0518 09/05/19  0829   GFRESTIMATED >90   < > >60   GFRESTBLACK  --   --  >60    < > = values in this interval not displayed.       Recent Labs   Lab Test 08/17/22  0644   CR 0.89             Passed - Patient does NOT have a diagnosis of CHF.        Passed - Medication is active on med list        Passed - Recent (6 mo) or future (30 days) visit within the authorizing provider's specialty     Patient had office visit in the last 6 months or has a visit in the next 30 days with authorizing provider or within the authorizing provider's specialty.  See \"Patient Info\" tab in inbasket, or \"Choose Columns\" in Meds & Orders section of the refill encounter.                 Mino Mead RN 01/12/23 7:10 AM  "

## 2023-01-12 NOTE — TELEPHONE ENCOUNTER
"Routing refill request to provider for review/approval because:  Early refill requested.    Last Written Prescription Date:  3/28/22  Last Fill Quantity: 180,  # refills: 3   Last office visit provider:  11/8/22     Requested Prescriptions   Pending Prescriptions Disp Refills     metFORMIN (GLUCOPHAGE) 1000 MG tablet 180 tablet 3     Sig: TAKE 1 TABLET BY MOUTH TWICE DAILY WITH MEALS.       Biguanide Agents Passed - 1/10/2023  2:50 PM        Passed - Patient is age 10 or older        Passed - Patient has documented A1c within the specified period of time.     If HgbA1C is 8 or greater, it needs to be on file within the past 3 months.  If less than 8, must be on file within the past 6 months.     Recent Labs   Lab Test 11/08/22  0822   A1C 7.2*             Passed - Patient's CR is NOT>1.4 OR Patient's EGFR is NOT<45 within past 12 mos.     Recent Labs   Lab Test 08/17/22  0644 01/05/22  0518 09/05/19  0829   GFRESTIMATED >90   < > >60   GFRESTBLACK  --   --  >60    < > = values in this interval not displayed.       Recent Labs   Lab Test 08/17/22  0644   CR 0.89             Passed - Patient does NOT have a diagnosis of CHF.        Passed - Medication is active on med list        Passed - Recent (6 mo) or future (30 days) visit within the authorizing provider's specialty     Patient had office visit in the last 6 months or has a visit in the next 30 days with authorizing provider or within the authorizing provider's specialty.  See \"Patient Info\" tab in inbasket, or \"Choose Columns\" in Meds & Orders section of the refill encounter.             Signed Prescriptions Disp Refills    atorvastatin (LIPITOR) 20 MG tablet 90 tablet 1     Sig: Take 1 tablet (20 mg) by mouth daily       Statins Protocol Passed - 1/12/2023  7:10 AM        Passed - LDL on file in past 12 months     Recent Labs   Lab Test 05/05/22  0809   LDL 34             Passed - No abnormal creatine kinase in past 12 months     No lab results found.         " "    Passed - Recent (12 mo) or future (30 days) visit within the authorizing provider's specialty     Patient has had an office visit with the authorizing provider or a provider within the authorizing providers department within the previous 12 mos or has a future within next 30 days. See \"Patient Info\" tab in inbasket, or \"Choose Columns\" in Meds & Orders section of the refill encounter.              Passed - Medication is active on med list        Passed - Patient is age 18 or older          lisinopril (ZESTRIL) 5 MG tablet 180 tablet 2     Sig: Take 2 tablets (10 mg) by mouth daily       ACE Inhibitors (Including Combos) Protocol Passed - 1/12/2023  7:10 AM        Passed - Blood pressure under 140/90 in past 12 months     BP Readings from Last 3 Encounters:   11/08/22 120/72   08/17/22 125/83   08/02/22 132/80                 Passed - Recent (12 mo) or future (30 days) visit within the authorizing provider's specialty     Patient has had an office visit with the authorizing provider or a provider within the authorizing providers department within the previous 12 mos or has a future within next 30 days. See \"Patient Info\" tab in inbasket, or \"Choose Columns\" in Meds & Orders section of the refill encounter.              Passed - Medication is active on med list        Passed - Patient is age 18 or older        Passed - Normal serum creatinine on file in past 12 months     Recent Labs   Lab Test 08/17/22  0644   CR 0.89       Ok to refill medication if creatinine is low          Passed - Normal serum potassium on file in past 12 months     Recent Labs   Lab Test 08/17/22  0644   POTASSIUM 3.5                  Mino Mead RN 01/12/23 7:12 AM  "

## 2023-01-13 RX ORDER — LISINOPRIL 5 MG/1
10 TABLET ORAL DAILY
Qty: 180 TABLET | Refills: 3 | OUTPATIENT
Start: 2023-01-13

## 2023-01-13 RX ORDER — ATORVASTATIN CALCIUM 20 MG/1
TABLET, FILM COATED ORAL
Qty: 90 TABLET | Refills: 3 | OUTPATIENT
Start: 2023-01-13

## 2023-02-15 ENCOUNTER — TRANSFERRED RECORDS (OUTPATIENT)
Dept: HEALTH INFORMATION MANAGEMENT | Facility: CLINIC | Age: 58
End: 2023-02-15

## 2023-03-31 ENCOUNTER — ALLIED HEALTH/NURSE VISIT (OUTPATIENT)
Dept: RESEARCH | Facility: CLINIC | Age: 58
End: 2023-03-31
Payer: COMMERCIAL

## 2023-03-31 VITALS — BODY MASS INDEX: 36.6 KG/M2 | HEIGHT: 77 IN | WEIGHT: 310 LBS

## 2023-03-31 DIAGNOSIS — Z00.6 EXAMINATION OF PARTICIPANT OR CONTROL IN CLINICAL RESEARCH: Primary | ICD-10-CM

## 2023-03-31 NOTE — PROGRESS NOTES
"  [facility] Sleep Study Inclusion/Exclusion Criteria:    Study Name: [facility]  : Nicolette Barbour MD    Study Description: The purpose of this study is to collect sleep data for product research and development. We will compare the performance of the Smartwatch to a medical-grade Home Sleep Test (HST). We hope to learn whether the Smartwatch can be used to track sleep quality at home.    Protocol Version: 3.0  22-DEC-2022     Criteria #  Inclusion Criteria (ALL MUST BE YES)  YES/NO/N/A   1  Adult (age > 18 years old) Yes   2  Subject has a reliable WiFi network (examples of \"reliable\": able to video-conference call with a clear image, able to stream movies or video without interruption, play online computer games) Yes   3  Subject has access to a computer or smartphone with audiovisual capabilities (e.g., webcam and microphone/speaker*)  Yes   4  Subject is willing to comply with the study procedures    Yes         * applicable for subjects that are remotely consented and/or trained.     Criteria # Exclusion Criteria (ALL MUST BE NO) YES/NO/N/A   1  Active COVID infection   No   2  Decisionally impaired participants (no surrogate consenting is allowed)    No   3  Not understanding written and spoken English     No   4  Open wounds(s) on the wrist and/or forearm (in area where Smartwatch would be worn) No   5  Tattoos, large moles or scars on the dorsal aspect of wrist where the Smartwatch would be worn; individuals with tattoo on only one wrist may participate, if Smartwatch is worn on non-tattooed wrist    No   6  Known sensitivity or allergy to component of the Smartwatch   No   7  Planned travel across 2 or more times zones during study participation   No   8  Planned travel away from home for more than 5 days during the study period No   9  Overnight rotating shift work     No   10  Active and adherent to treatment for sleep apnea   (e.g., CPAP, dental appliance, Hypoglossal nerve stimulator)    " No   11  Plans to start treatment for apnea within the study timeframe    No   12  Overnight supplemental oxygen use   No   13    Employed by a company that develops or sells medical and/or fitness devices (e.g., ECG monitors, wearable fitness bands, sleep monitors, etc.) or are technology journalists (e.g., professional bloggers, TV, magazine, newspaper reporters, etc.) No   14    Participated in a previous sleep study that used a wrist-worn sensor device by same sponsor  No     Patient does fulfill study inclusion criteria and no exclusion criteria are found.     Nicolette Barbour MD    31-MAR-2023    GALO Godinez

## 2023-03-31 NOTE — PROGRESS NOTES
[facility] Study Consent    Study Description: The purpose of this study is to collect sleep data for product research and development. We will compare the performance of the Smartwatch to a medical-grade Home Sleep Test (HST). We hope to learn whether the Smartwatch can be used to track sleep quality at home.        Walter Santa a 58 year old male, was on-site  today to discuss participation in the [facility] sleep data collection study.     The consent form was reviewed with the patient.     The review of the study included:    Study Purpose     COVID-19 Criteria     Participant Responsibilities      Study Data and Devices    Benefits and Risks of Participation    Compensation and Costs of Participation    Voluntary Participation    Confidentiality     Injury and Legal Rights    Protocol Version: 3.0    The subject was queried in regards to his willingness to continue and his questions were answered to his satisfaction.     The patient has given his agreement to volunteer and participate in the above noted study.     The Consent  and HIPAA form version 4.0 28-FEB-2023 was signed at 9:21  on  31-MAR-2023 with the Clinical Research Unit of [facility].     A copy of the [facility] consent will be placed in subject's medical record. A copy of the consent form was given to the subject today.    Study data is directly entered into Epic and [employer] per protocol.     No study procedures were done prior to Walter Santa providing informed consent.       31-MAR-2023    GALO Godinez

## 2023-03-31 NOTE — PROGRESS NOTES
[facility] In-Person Study Note    Study Description: The purpose of this study is to collect sleep data for product research and development. We will compare the performance of the Smartwatch to a medical-grade Home Sleep Test (HST). We hope to learn whether the Smartwatch can be used to track sleep quality at home.       Subject ID:     SCREENING     Demographic Info  Walter Santa   1965          58 year old  male    Race: White  Ethnicity: Non-/     Salazar Scale: OBAN Salazar: 4    Medical History:  Hyperlipidemia  Seasonal Allergies  Type 2 Diabetes  Hypertension    Sleep Apnea Diagnosis: No    Allergies:  No Known Allergies     Current Medications:     Review of your medicines          Accurate as of March 31, 2023 10:45 AM. If you have any questions, ask your nurse or doctor.            CONTINUE these medicines which have NOT CHANGED      Dose / Directions   atorvastatin 20 MG tablet  1/12/2023 - present  Commonly known as: LIPITOR  Used for: Diabetes mellitus type 2 in obese (H)      Dose: 20 mg  Take 1 tablet (20 mg) by mouth daily  Quantity: 90 tablet  Refills: 1     cetirizine 10 MG tablet  1/12/2023 - present  Commonly known as: zyrTEC      Dose: 10 mg  Take 10 mg by mouth daily as needed for allergies  Refills: 0     lisinopril 5 MG tablet  1/12/2023 - present  Commonly known as: ZESTRIL  Used for: Essential hypertension with goal blood pressure less than 140/90      Dose: 10 mg  Take 2 tablets (10 mg) by mouth daily  Quantity: 180 tablet  Refills: 2     metFORMIN 1000 MG tablet  1/12/2023 - present  Commonly known as: GLUCOPHAGE  Used for: Diabetes mellitus type 2 in obese (H)      Dose: 1,000 mg  Take 1 tablet (1,000 mg) by mouth 2 times daily (with meals)  Quantity: 180 tablet  Refills: 1            Past Surgical History:  Past Surgical History:   Procedure Laterality Date     ARTHROSCOPY KNEE Right 09/2011     DAVINCI PROSTATECTOMY, LYMPHADENECTOMY N/A 8/16/2022  "    PALMAR FASCIECTOMY Left 03/09/2016     SHOULDER SURGERY Right      TONSILLECTOMY                Vitals:  Time Subject Sat: 9:41   Ht 1.956 m (6' 5\")   Wt 140.6 kg (310 lb)   BMI 36.76 kg/m         Lifestyle:  Occupation: Security     Do you have a Bed Partner/Co-Sleeper? Yes   Previous Sleep Study?: No       (If Yes) Initial AHI Score: N/A    Alcohol Use: 5 drinks/week  Smoking History: No      Measurements & Preferences:  Dominant Hand: Right   Preferred Watch Wrist: Left    Left Wrist:  Circumference (mm): 186   Hairiness: D: Thick Hair, High Density   Tattoos, Moles, Scars? None    Right Wrist:   Circumference (mm): 190   Hairiness: D: Thick Hair, High Density   Tattoos, Moles, Scars? None    Was data collected?: Yes    ENROLLMENT & DEVICES      Device IDs:  Watch ID: [id number]    Watch Size: 44 mm   Band Size: M/L  Natural Notch: 5   Secure Notch: 5   Watch Setting Worn: 5   Phone ID: [id number]  Nox ID: [id number]     Has the Subject Enrolled in the Study [place]: Yes   Confirmation of Enrollment?: Yes   Enrollment Date: 31-MAR-2023  Smartwatch Training Completed? Yes   Smartwatch Training Date: 31-MAR-2023  HSAT Training Completed? Yes   HSAT Training Date: 31-MAR-2023    31-MAR-2023  GALO Godinez  "

## 2023-04-05 ENCOUNTER — ALLIED HEALTH/NURSE VISIT (OUTPATIENT)
Dept: RESEARCH | Facility: CLINIC | Age: 58
End: 2023-04-05
Payer: COMMERCIAL

## 2023-04-05 DIAGNOSIS — Z00.6 RESEARCH SUBJECT: Primary | ICD-10-CM

## 2023-04-05 PROCEDURE — 99207 PR NO CHARGE NURSE ONLY: CPT

## 2023-04-05 NOTE — PROGRESS NOTES
[facility] HSAT Kit Return  Study Description: The purpose of this study is to collect sleep data for product research and development. We will compare the performance of the Smartwatch to a medical-grade Home Sleep Test (HST). We hope to learn whether the Smartwatch can be used to track sleep quality at home.      Subject Number:     Study Day: Week 2    Tracking Number: N/A    Compliance Questions:  Did you wear a T-Shirt over the heart monitor? Yes  Did you double tape device tubing/wires?Yes  Did you turn-off your heart monitor each morning after collection?Yes  Did all equipment function correctly and stay-on throughout night?Yes  Did you see the red light underneath the watch turn-on both nights?Yes      Participant returned HSAT kit with all devices returned. Participant verbalized understanding that if their data is unusable per sponsor, they will conduct one additional set of HSAT recordings. All other questions/concerns addressed.     Adverse Events & Con Med Assessment Performed?   [x] None    (If yes, please generate adverse event report for PI to rell)    5-APR-2023    Marianne Lopez RN

## 2023-04-11 ENCOUNTER — TELEPHONE (OUTPATIENT)
Dept: RESEARCH | Facility: CLINIC | Age: 58
End: 2023-04-11
Payer: COMMERCIAL

## 2023-04-12 ENCOUNTER — VIRTUAL VISIT (OUTPATIENT)
Dept: RESEARCH | Facility: CLINIC | Age: 58
End: 2023-04-12
Payer: COMMERCIAL

## 2023-04-12 DIAGNOSIS — Z00.6 EXAMINATION OF PARTICIPANT OR CONTROL IN CLINICAL RESEARCH: Primary | ICD-10-CM

## 2023-04-12 PROCEDURE — 99207 PR NO CHARGE NURSE ONLY: CPT

## 2023-04-12 NOTE — TELEPHONE ENCOUNTER
[facility] HSAT Results Phone Call  Study Description: The purpose of this study is to collect sleep data for product research and development. We will compare the performance of the Smartwatch to a medical-grade Home Sleep Test (HST). We hope to learn whether the Smartwatch can be used to track sleep quality at home.      Walter Santa was called today to review results of his Home Sleep Test (HSAT).     They were informed of the significance of the results and they affirmed understanding. Additionally I informed them that these results are available for review by their provider in their chart.  They had no further questions at this time.- I called Walter to inform him of his results.  He stated this was not new to him as he frequently wakes up, but is able to fall right back asleep. He also knows about the PVCs and is asymptomatic. If he becomes symptomatic or would like further rhythm assessment, he can have a 12 lead EKG performed at this PCP office to determine how frequent the PVCs are.  He has a followup appt May 9th with his primary PCP Dr. Kojo Parekh.  He will discuss these findings as well as the possibility of obtaining a mouthguard to help with the sleep apnea. Carmen Del Rio PA-C 4/12/23    Additional Comments: Heart Rate 50-94 and . Duration greater than 90 was 0.8-2.7 minutes. Sinus rhythm with sinus arrhythmia. PVCs noted     AHI Score: AHI of 5 to less than 15 (Mild ALBERTO) 8.1, 6.9, 12.9, 12.4. Less than 90% was 1.2-4%, less than 88% was 0.5-1.5% and less than 85% was 0.  Snoring 22.3-22.5%.     Supine 3.2-30.6 minutes 0.7%7.2%    Results:  NIGHT 1  Was it scorable?: Yes     Channel Presence: Scored and all 6 channels present  Select Missing Channels: N/A     (If applicable)      Analysis Start Date: 4/3/2023   Analysis Duration: 7hr 10min  Analysis Start Time: 9:12pm        Analysis Stop Time: 4:22 am    Est Total Sleep Time: 7 hr 4 min      NIGHT 1 Scorer 1 Scorer 2   Respiratory  Parameters   Apnea + Hypopneas (AH)   Total    8.1 /h   6.9 /h   Supine    21.6 /h   19.6 /h   Non-Supine   7 /h   5.9 /h   Count   57    49        Total Total    Obstructive (OA)    0.1 /h   0 /h   Hypopneas    7.6 /h   6.6 /h   Central Apnea Hypopnea (CA+CH)    0.3 /h   0.3 /h   Oxygen Saturation (SpO2)   Oxygen Desaturation Index (NICCI)    9.2 /h   9.5 /h   Minimum SpO2    86 %   86 %   SpO2 Duration < 88%    0.5 %   0.5 %   Average Desat Drop    4.8 %   4.8 %   Position & Analysis Time   Supine (in TST) Duration    30.6 min   30.6 min       NIGHT 2  Was it scorable?: Yes (If no, delete the table below)     Channel Presence: Scored and all 6 channels present  Select Missing Channels: N/A     (If applicable)    Analysis Start Date: 4/4/2023   Analysis Duration: 7hr 13min  Analysis Start Time: 8:54 pm        Analysis Stop Time: 4:07 am    Est Total Sleep Time: 7h 4 m        NIGHT 2 Scorer 1 Scorer 2   Respiratory Parameters   Apnea + Hypopneas (AH)   Total    12.9 /h   12.4 /h   Supine    37.9 /h   37.9 /h   Non-Supine   12.7 /h   12.2 /h   Count   91    88        Total Total    Obstructive (OA)    1.1 /h   1.3 /h   Hypopneas    11 /h   10.7 /h   Central Apnea Hypopnea (CA+CH)    0.7 /h   0.4 /h   Oxygen Saturation (SpO2)   Oxygen Desaturation Index (NICCI)    13.6 /h   13.7 /h   Minimum SpO2    84 %   84 %   SpO2 Duration < 88%    1.5 %   1.5 %   Average Desat Drop    5.1 %   5.1 %   Position & Analysis Time   Supine (in TST) Duration    3.2 min   3.2 min       11-APR-2023    Zeny Avila NP

## 2023-04-12 NOTE — PROGRESS NOTES
" [place] Study Phone Visit    Study Description: The purpose of this study is to collect sleep data for product research and development. We will compare the performance of the Smartwatch to a medical-grade Home Sleep Test (HST). We hope to learn whether the Smartwatch can be used to track sleep quality at home.      Subject Number:     Study Day: Week 3    [place] Study Subject was called today to:    Review Compliance     Answer subject questions    Deliver study protocol reminders to subject    Reminders Checklist:   [x]  Connected to WiFi  [x]  Completing both morning and evening surveys  [x]  Watch and Phone on  near each other after completed morning survey   [x]  Take devices off before showering/getting them wet  [x]  Make sure to dismiss any update notifications. -Tap \"Not Now\"  [x]  Good HSAT   [x]  Remind them of next appointment with us        Adverse Events & Con Med Assessment Performed?   [x]     (If yes, please generate adverse event report for PI to rell)      12-APR-2023    Ambrosio Manuel    "

## 2023-04-19 ENCOUNTER — VIRTUAL VISIT (OUTPATIENT)
Dept: RESEARCH | Facility: CLINIC | Age: 58
End: 2023-04-19
Payer: COMMERCIAL

## 2023-04-19 DIAGNOSIS — Z00.6 RESEARCH SUBJECT: Primary | ICD-10-CM

## 2023-04-19 PROCEDURE — 99207 PR NO CHARGE NURSE ONLY: CPT

## 2023-04-19 NOTE — PROGRESS NOTES
" [place] Study Phone Visit    Study Description: The purpose of this study is to collect sleep data for product research and development. We will compare the performance of the Smartwatch to a medical-grade Home Sleep Test (HST). We hope to learn whether the Smartwatch can be used to track sleep quality at home.      Subject Number:     Study Day: Week 4    [place] Study Subject was called today to:    Review Compliance     Answer subject questions    Deliver study protocol reminders to subject    Reminders Checklist:   [x]  Connected to WiFi  [x]  Completing both morning and evening surveys  [x]  Watch and Phone on  near each other after completed morning survey   [x]  Take devices off before showering/getting them wet  [x]  Make sure to dismiss any update notifications. -Tap \"Not Now\"  [x]  Remind them of next appointment with us     (Applicable during last 4 days)  [x]  NO WATCH WEAR JUST SURVEY     Adverse Events & Con Med Assessment Performed?   [x]     (If yes, please generate adverse event report for PI to cosign)      19-APR-2023    Marianne Lopez RN    "

## 2023-04-26 ENCOUNTER — VIRTUAL VISIT (OUTPATIENT)
Dept: RESEARCH | Facility: CLINIC | Age: 58
End: 2023-04-26
Payer: COMMERCIAL

## 2023-04-26 DIAGNOSIS — Z00.6 EXAMINATION OF PARTICIPANT OR CONTROL IN CLINICAL RESEARCH: Primary | ICD-10-CM

## 2023-04-26 PROCEDURE — 99207 PR NO CHARGE NURSE ONLY: CPT

## 2023-04-26 NOTE — PROGRESS NOTES
" [place] Study Phone Visit    Study Description: The purpose of this study is to collect sleep data for product research and development. We will compare the performance of the Smartwatch to a medical-grade Home Sleep Test (HST). We hope to learn whether the Smartwatch can be used to track sleep quality at home.      Subject Number:     Study Day: Week 5    [place] Study Subject was called today to:    Review Compliance     Answer subject questions    Deliver study protocol reminders to subject    Reminders Checklist:   [x]  Connected to WiFi  [x]  Completing both morning and evening surveys  [x]  Watch and Phone on  near each other after completed morning survey   [x]  Take devices off before showering/getting them wet  [x]  Make sure to dismiss any update notifications. -Tap \"Not Now\"  [x]  Good HSAT   [x]  Remind them of next appointment with us     (Applicable during last 4 days)  [x]  NO WATCH WEAR JUST SURVEY     Adverse Events & Con Med Assessment Performed?   [x]     (If yes, please generate adverse event report for PI to cosign)       26-APR-2023    Po Tan    "

## 2023-05-03 ENCOUNTER — ALLIED HEALTH/NURSE VISIT (OUTPATIENT)
Dept: RESEARCH | Facility: CLINIC | Age: 58
End: 2023-05-03
Payer: COMMERCIAL

## 2023-05-03 DIAGNOSIS — Z00.6 EXAMINATION OF PARTICIPANT OR CONTROL IN CLINICAL RESEARCH: Primary | ICD-10-CM

## 2023-05-03 PROCEDURE — 99207 PR NO CHARGE NURSE ONLY: CPT

## 2023-05-03 NOTE — PROGRESS NOTES
[place] Study End Note    Study Description: The purpose of this study is to collect sleep data for product research and development. We will compare the performance of the Smartwatch to a medical-grade Home Sleep Test (HST). We hope to learn whether the Smartwatch can be used to track sleep quality at home.        Study Termination/Completion Date: 3-MAY-2023  Reason for Termination (If Applicable): Completed     Subject returned all study devices today and this completes this study for the subject.    Adverse Events & Con Med Assessment Performed?   [x]       3-MAY-2023    Ambrosio Manuel

## 2023-05-09 ENCOUNTER — OFFICE VISIT (OUTPATIENT)
Dept: INTERNAL MEDICINE | Facility: CLINIC | Age: 58
End: 2023-05-09
Payer: COMMERCIAL

## 2023-05-09 VITALS
OXYGEN SATURATION: 98 % | BODY MASS INDEX: 35.95 KG/M2 | RESPIRATION RATE: 16 BRPM | SYSTOLIC BLOOD PRESSURE: 106 MMHG | WEIGHT: 304.5 LBS | HEART RATE: 61 BPM | TEMPERATURE: 97.9 F | HEIGHT: 77 IN | DIASTOLIC BLOOD PRESSURE: 66 MMHG

## 2023-05-09 DIAGNOSIS — E78.5 HYPERLIPIDEMIA LDL GOAL <100: ICD-10-CM

## 2023-05-09 DIAGNOSIS — E11.69 DIABETES MELLITUS TYPE 2 IN OBESE: Primary | ICD-10-CM

## 2023-05-09 DIAGNOSIS — I10 ESSENTIAL HYPERTENSION WITH GOAL BLOOD PRESSURE LESS THAN 140/90: ICD-10-CM

## 2023-05-09 DIAGNOSIS — E66.9 DIABETES MELLITUS TYPE 2 IN OBESE: Primary | ICD-10-CM

## 2023-05-09 DIAGNOSIS — Z90.79 S/P PROSTATECTOMY: ICD-10-CM

## 2023-05-09 DIAGNOSIS — E66.01 MORBID OBESITY (H): ICD-10-CM

## 2023-05-09 LAB
ALBUMIN SERPL BCG-MCNC: 4.5 G/DL (ref 3.5–5.2)
ALP SERPL-CCNC: 69 U/L (ref 40–129)
ALT SERPL W P-5'-P-CCNC: 20 U/L (ref 10–50)
ANION GAP SERPL CALCULATED.3IONS-SCNC: 13 MMOL/L (ref 7–15)
AST SERPL W P-5'-P-CCNC: 21 U/L (ref 10–50)
BILIRUB SERPL-MCNC: 0.7 MG/DL
BUN SERPL-MCNC: 13.6 MG/DL (ref 8–23)
CALCIUM SERPL-MCNC: 9.6 MG/DL (ref 8.8–10.2)
CHLORIDE SERPL-SCNC: 102 MMOL/L (ref 98–107)
CHOLEST SERPL-MCNC: 86 MG/DL
CREAT SERPL-MCNC: 0.95 MG/DL (ref 0.67–1.17)
DEPRECATED HCO3 PLAS-SCNC: 25 MMOL/L (ref 22–29)
ERYTHROCYTE [DISTWIDTH] IN BLOOD BY AUTOMATED COUNT: 12.5 % (ref 10–15)
GFR SERPL CREATININE-BSD FRML MDRD: >90 ML/MIN/1.73M2
GLUCOSE SERPL-MCNC: 133 MG/DL (ref 70–99)
HBA1C MFR BLD: 6.7 % (ref 0–5.6)
HCT VFR BLD AUTO: 39.9 % (ref 40–53)
HDLC SERPL-MCNC: 37 MG/DL
HGB BLD-MCNC: 13.8 G/DL (ref 13.3–17.7)
LDLC SERPL CALC-MCNC: 33 MG/DL
MCH RBC QN AUTO: 31.2 PG (ref 26.5–33)
MCHC RBC AUTO-ENTMCNC: 34.6 G/DL (ref 31.5–36.5)
MCV RBC AUTO: 90 FL (ref 78–100)
NONHDLC SERPL-MCNC: 49 MG/DL
PLATELET # BLD AUTO: 150 10E3/UL (ref 150–450)
POTASSIUM SERPL-SCNC: 4.4 MMOL/L (ref 3.4–5.3)
PROT SERPL-MCNC: 7.1 G/DL (ref 6.4–8.3)
RBC # BLD AUTO: 4.43 10E6/UL (ref 4.4–5.9)
SODIUM SERPL-SCNC: 140 MMOL/L (ref 136–145)
TRIGL SERPL-MCNC: 82 MG/DL
TSH SERPL DL<=0.005 MIU/L-ACNC: 1.28 UIU/ML (ref 0.3–4.2)
WBC # BLD AUTO: 4.4 10E3/UL (ref 4–11)

## 2023-05-09 PROCEDURE — 85027 COMPLETE CBC AUTOMATED: CPT | Performed by: INTERNAL MEDICINE

## 2023-05-09 PROCEDURE — 84443 ASSAY THYROID STIM HORMONE: CPT | Performed by: INTERNAL MEDICINE

## 2023-05-09 PROCEDURE — 80053 COMPREHEN METABOLIC PANEL: CPT | Performed by: INTERNAL MEDICINE

## 2023-05-09 PROCEDURE — 99213 OFFICE O/P EST LOW 20 MIN: CPT | Performed by: INTERNAL MEDICINE

## 2023-05-09 PROCEDURE — 83036 HEMOGLOBIN GLYCOSYLATED A1C: CPT | Performed by: INTERNAL MEDICINE

## 2023-05-09 PROCEDURE — 80061 LIPID PANEL: CPT | Performed by: INTERNAL MEDICINE

## 2023-05-09 PROCEDURE — 36415 COLL VENOUS BLD VENIPUNCTURE: CPT | Performed by: INTERNAL MEDICINE

## 2023-05-09 RX ORDER — LISINOPRIL 5 MG/1
5 TABLET ORAL DAILY
Start: 2023-05-09 | End: 2023-11-16

## 2023-05-09 NOTE — PROGRESS NOTES
Office Visit - Follow Up   Walter Santa   58 year old male    Date of Visit: 5/9/2023    Chief Complaint   Patient presents with     RECHECK     Diabetes follow up        -------------------------------------------------------------------------------------------------------------------------  Assessment and Plan    Follow-up multiple issues, Walter overall is doing well    May 9, 2023, Walter is doing great, has managed to trim off about 6 pounds since March 2023, and he plans to try to get rid of another 25 to 30 pounds this summer.  That would imply a losing 2 pounds per week, which means that Walter needs to run a calorie deficit of 7000 kera/week.  That means 1000-calorie deficit per day.  He told me he is putting in 10,000 steps which is great, and he is going to crank up the dietary discipline.    He told me that the bladder function continues to improve.  He follows up with urology, and PSA numbers are remain 0.    Walter is fasting this morning so we will have him swing by the laboratory for a comprehensive metabolic panel, TSH for thyroid, A1c to check diabetes, lipid profile.    Reducing his dose of lisinopril from 10 down to 5 mg a day, since his blood pressure has been well controlled, maybe even a bit on the low side, and furthermore he plans to lose weight and were going into the hotter summer months, so that less lisinopril would probably be better in case he gets a little dehydrated.  But he should try to stay hydrated.    Prostate cancer, status post radical prostatectomy and lymph node dissection August 16, 2022  Post procedure incontinence, much improved as of May 2023, no longer needing to use an absorptive pad  follows-up with Dr. Gallego, might possibly consider going on tadalafil     8-  NERVE SPARING ROBOTIC ASSISTED PROSTATECTOMY, LEFT PELVIC LYMPH NODE DISSECTION with Catalino Gallego MD  Walter's diagnosis started with an elevated PSA of about 15 from May 5, 2022.       Type 2  diabetes, has been on long-term metformin.  He told me he is due for an eye exam, he goes to [facility]  metFORMIN (GLUCOPHAGE) 1000 MG tablet        TAKE 1 TABLET BY MOUTH TWICE DAILY WITH MEALS     Latest Reference Range & Units 11/08/22 08:22   Hemoglobin A1C 0.0 - 5.6 % 7.2 (H)     Might possibly have some early mild diabetic neuropathy symptoms, with some tingling and numbness in his toes--but he told me these are better as of May 2023     Arthroscopic right knee surgery to repair torn meniscus 2/10/2022 done at [facility]     Obesity with body mass index of 36  Wt Readings from Last 5 Encounters:   05/09/23 138.1 kg (304 lb 8 oz)   03/31/23 140.6 kg (310 lb)   11/08/22 140.6 kg (310 lb)   08/16/22 139.7 kg (307 lb 14.4 oz)   08/02/22 144.7 kg (319 lb)     Kidney stone, passed 1-7-2022 1-5-2022  CT Abdomen Pelvis w Contrast  IMPRESSION: 5 mm calculus at the left ureterovesical junction causes mild left hydronephrosis and hydroureter with a delayed nephrogram and perinephric infiltration.     Fully vaccinated and boosted for COVID-19, received his third shot of Pfizer October 2021. [facility] told him to bring his vaccination card with him, and apparently they're not requiring him to be tested.     Hypertension   5-9-2023 Reducing his dose of lisinopril from 10 down to 5 mg a day, since his blood pressure has been well controlled, maybe even a bit on the low side, and furthermore he plans to lose weight and were going into the hotter summer months, so that less lisinopril would probably be better in case he gets a little dehydrated.  But he should try to stay hydrated.    BP Readings from Last 6 Encounters:   05/09/23 106/66   11/08/22 120/72   08/17/22 125/83   08/02/22 132/80   05/05/22 124/83   02/03/22 129/89     Abnormal ECG, inferior infarct pattern seen since 2017, right bundle branch block present August 2, 2022  walks 10K steps a day  Non-smoker  No history cardiac  events     8-2-2022  Sinus rhythm   Right bundle branch block   Inferior infarct (cited on or before 20-NOV-2017)   Abnormal ECG   When compared with ECG of 05-JAN-2022 05:23,   Right bundle branch block is now Present   Minimal criteria for Anterior infarct are no longer Present     Hyperlipidemia  atorvastatin (LIPITOR) 20 MG tablet    Take 1 tablet (20 mg total) by mouth daily.     5-5-2022  LDL Cholesterol Calculated <=129 mg/dL 34       Direct Measure HDL >=40 mg/dL 36 Low       Triglycerides <=149 mg/dL 90       Cholesterol <=199 mg/dL 88      Kidney stone, passed 1-7-2022 1-5-2022  CT Abdomen Pelvis w Contrast  IMPRESSION: 5 mm calculus at the left ureterovesical junction causes mild left hydronephrosis and hydroureter with a delayed nephrogram and perinephric infiltration.     Sleep apnea, mild  Walter was a study subject for the [place] study, comparing the performance of a smart watch to a medical grade home sleep test.  It indicated that Walter has maybe 30-40 apnea events per night, but this is deemed mild, and he is going to try managing without CPAP for the time being, but always the option of trying CPAP     Colonoscopy 8/1/2013 was normal, recheck 10 years would correspond to 2023  Walter is good rescheduling his recheck colonoscopy for the summer 2023    Family history of cerebral aneurysm (mother) and abdominal aortic aneurysm (grandfather)   I told Walter he does not need to worry about these for himself, because he is aorta was included in a CT abdomen January 5, 2022, and he had a magnetic resonance angiogram of the brain with [place] December 2013     History of shingles in an unusual location (palm of his right hand), occurred while he was in [place] February 2019     COVID shot #4 Pfizer May 5, 2022  Walter already got his seasonal flu vaccine for autumn 2022  I told him he could consider getting the shingles vaccine  (optional)       --------------------------------------------------------------------------------------------------------------------------  History of Present Illness  This 58 year old old        Diabetes:   He presents for follow up of diabetes.  He is not checking blood glucose. He has no concerns regarding his diabetes at this time.  He is not experiencing numbness or burning in feet, excessive thirst, blurry vision, weight changes or redness, sores or blisters on feet. The patient has not had a diabetic eye exam in the last 12 months.      Hyperlipidemia:  He presents for follow up of hyperlipidemia.  He is taking medication to lower cholesterol. He is not having myalgia or other side effects to statin medications.     He eats 2-3 servings of fruits and vegetables daily.He consumes 0 sweetened beverage(s) daily.He exercises with enough effort to increase his heart rate 30 to 60 minutes per day.  He exercises with enough effort to increase his heart rate 7 days per week.   He is taking medications regularly.       Wt Readings from Last 3 Encounters:   05/09/23 138.1 kg (304 lb 8 oz)   03/31/23 140.6 kg (310 lb)   11/08/22 140.6 kg (310 lb)     BP Readings from Last 3 Encounters:   05/09/23 106/66   11/08/22 120/72   08/17/22 125/83       ---------------------------------------------------------------------------------------------------------------------------    Medications, Allergies, Social, and Problem List       Current Outpatient Medications   Medication Sig Dispense Refill     atorvastatin (LIPITOR) 20 MG tablet Take 1 tablet (20 mg) by mouth daily 90 tablet 1     lisinopril (ZESTRIL) 5 MG tablet Take 1 tablet (5 mg) by mouth daily       metFORMIN (GLUCOPHAGE) 1000 MG tablet Take 1 tablet (1,000 mg) by mouth 2 times daily (with meals) 180 tablet 1     No Known Allergies  Social History     Tobacco Use     Smoking status: Never     Smokeless tobacco: Never   Vaping Use     Vaping status: Never Used  "    Patient Active Problem List   Diagnosis     Hyperlipidemia LDL goal <100     Diabetes mellitus type 2 in obese (H)     Attention Deficit Disorder Without Hyperactivity     Obesity (BMI 35.0-39.9) with comorbidity (H)     Essential hypertension with goal blood pressure less than 140/90     Abnormal electrocardiogram     Prostate cancer (H)     S/P prostatectomy        Reviewed, reconciled and updated       Physical Exam   General Appearance:       /66 (BP Location: Right arm, Patient Position: Sitting, Cuff Size: Adult Large)   Pulse 61   Temp 97.9  F (36.6  C) (Oral)   Resp 16   Ht 1.956 m (6' 5\")   Wt 138.1 kg (304 lb 8 oz)   SpO2 98%   BMI 36.11 kg/m      Walter look great today, blood pressure may be even a bit on the low side, but he feels well  Lungs clear  Heart regular rate and rhythm  Abdomen nontender  Extremities no edema     Additional Information   I spent 20 minutes on this encounter, including reviewing interval history since last visit, examining the patient, explaining and counseling the issues enumerated in the Assessment and Plan (patient given a copy), ordering indicated tests       TRISTON CALVIN MD, MD        "

## 2023-05-09 NOTE — PATIENT INSTRUCTIONS
Follow-up multiple issues, Walter overall is doing well    May 9, 2023, Walter is doing great, has managed to trim off about 6 pounds since March 2023, and he plans to try to get rid of another 25 to 30 pounds this summer.  That would imply a losing 2 pounds per week, which means that Walter needs to run a calorie deficit of 7000 kera/week.  That means 1000-calorie deficit per day.  He told me he is putting in 10,000 steps which is great, and he is going to crank up the dietary discipline.    He told me that the bladder function continues to improve.  He follows up with urology, and PSA numbers are remain 0.    Walter is fasting this morning so we will have him swing by the laboratory for a comprehensive metabolic panel, TSH for thyroid, A1c to check diabetes, lipid profile.    Reducing his dose of lisinopril from 10 down to 5 mg a day, since his blood pressure has been well controlled, maybe even a bit on the low side, and furthermore he plans to lose weight and were going into the hotter summer months, so that less lisinopril would probably be better in case he gets a little dehydrated.  But he should try to stay hydrated.    Prostate cancer, status post radical prostatectomy and lymph node dissection August 16, 2022  Post procedure incontinence, much improved as of May 2023, no longer needing to use an absorptive pad  follows-up with Dr. Gallego, might possibly consider going on tadalafil     8-  NERVE SPARING ROBOTIC ASSISTED PROSTATECTOMY, LEFT PELVIC LYMPH NODE DISSECTION with Catalino Gallego MD  Walter's diagnosis started with an elevated PSA of about 15 from May 5, 2022.       Type 2 diabetes, has been on long-term metformin.  He told me he is due for an eye exam, he goes to [facility]  metFORMIN (GLUCOPHAGE) 1000 MG tablet        TAKE 1 TABLET BY MOUTH TWICE DAILY WITH MEALS     Latest Reference Range & Units 11/08/22 08:22   Hemoglobin A1C 0.0 - 5.6 % 7.2 (H)     Might possibly have some early  mild diabetic neuropathy symptoms, with some tingling and numbness in his toes--but he told me these are better as of May 2023     Arthroscopic right knee surgery to repair torn meniscus 2/10/2022 done at [facility]     Obesity with body mass index of 36  Wt Readings from Last 5 Encounters:   05/09/23 138.1 kg (304 lb 8 oz)   03/31/23 140.6 kg (310 lb)   11/08/22 140.6 kg (310 lb)   08/16/22 139.7 kg (307 lb 14.4 oz)   08/02/22 144.7 kg (319 lb)     Kidney stone, passed 1-7-2022 1-5-2022  CT Abdomen Pelvis w Contrast  IMPRESSION: 5 mm calculus at the left ureterovesical junction causes mild left hydronephrosis and hydroureter with a delayed nephrogram and perinephric infiltration.     Fully vaccinated and boosted for COVID-19, received his third shot of Pfizer October 2021. [facility] told him to bring his vaccination card with him, and apparently they're not requiring him to be tested.     Hypertension   5-9-2023 Reducing his dose of lisinopril from 10 down to 5 mg a day, since his blood pressure has been well controlled, maybe even a bit on the low side, and furthermore he plans to lose weight and were going into the hotter summer months, so that less lisinopril would probably be better in case he gets a little dehydrated.  But he should try to stay hydrated.    BP Readings from Last 6 Encounters:   05/09/23 106/66   11/08/22 120/72   08/17/22 125/83   08/02/22 132/80   05/05/22 124/83   02/03/22 129/89     Abnormal ECG, inferior infarct pattern seen since 2017, right bundle branch block present August 2, 2022  walks 10K steps a day  Non-smoker  No history cardiac events     8-2-2022  Sinus rhythm   Right bundle branch block   Inferior infarct (cited on or before 20-NOV-2017)   Abnormal ECG   When compared with ECG of 05-JAN-2022 05:23,   Right bundle branch block is now Present   Minimal criteria for Anterior infarct are no longer Present     Hyperlipidemia  atorvastatin (LIPITOR) 20 MG tablet     Take 1 tablet (20 mg total) by mouth daily.     5-5-2022  LDL Cholesterol Calculated <=129 mg/dL 34       Direct Measure HDL >=40 mg/dL 36 Low       Triglycerides <=149 mg/dL 90       Cholesterol <=199 mg/dL 88      Kidney stone, passed 1-7-2022 1-5-2022  CT Abdomen Pelvis w Contrast  IMPRESSION: 5 mm calculus at the left ureterovesical junction causes mild left hydronephrosis and hydroureter with a delayed nephrogram and perinephric infiltration.     Sleep apnea, mild  Walter was a study subject for the [place] study, comparing the performance of a smart watch to a medical grade home sleep test.  It indicated that Walter has maybe 30-40 apnea events per night, but this is deemed mild, and he is going to try managing without CPAP for the time being, but always the option of trying CPAP     Colonoscopy 8/1/2013 was normal, recheck 10 years would correspond to 2023  Walter is good rescheduling his recheck colonoscopy for the summer 2023    Family history of cerebral aneurysm (mother) and abdominal aortic aneurysm (grandfather)   I told Walter he does not need to worry about these for himself, because he is aorta was included in a CT abdomen January 5, 2022, and he had a magnetic resonance angiogram of the brain with [facility] December 2013     History of shingles in an unusual location (palm of his right hand), occurred while he was in [place] February 2019     COVID shot #4 Pfizer May 5, 2022  Walter already got his seasonal flu vaccine for autumn 2022  I told him he could consider getting the shingles vaccine (optional)

## 2023-05-16 ENCOUNTER — TRANSFERRED RECORDS (OUTPATIENT)
Dept: HEALTH INFORMATION MANAGEMENT | Facility: CLINIC | Age: 58
End: 2023-05-16
Payer: COMMERCIAL

## 2023-07-06 DIAGNOSIS — E11.69 DIABETES MELLITUS TYPE 2 IN OBESE: ICD-10-CM

## 2023-07-06 DIAGNOSIS — E66.9 DIABETES MELLITUS TYPE 2 IN OBESE: ICD-10-CM

## 2023-07-06 RX ORDER — ATORVASTATIN CALCIUM 20 MG/1
TABLET, FILM COATED ORAL
Qty: 90 TABLET | Refills: 3 | Status: SHIPPED | OUTPATIENT
Start: 2023-07-06 | End: 2024-06-04

## 2023-07-06 NOTE — TELEPHONE ENCOUNTER
"Last Written Prescription Date:  1/12//23  Last Fill Quantity: 90,  # refills: 1   Last office visit provider:  5/9/23     Requested Prescriptions   Pending Prescriptions Disp Refills     atorvastatin (LIPITOR) 20 MG tablet [Pharmacy Med Name: ATORVASTATIN CALCIUM 20MG TABS] 90 tablet 1     Sig: TAKE ONE TABLET BY MOUTH ONCE DAILY       Statins Protocol Passed - 7/6/2023 11:07 AM        Passed - LDL on file in past 12 months     Recent Labs   Lab Test 05/09/23  0744   LDL 33             Passed - No abnormal creatine kinase in past 12 months     No lab results found.             Passed - Recent (12 mo) or future (30 days) visit within the authorizing provider's specialty     Patient has had an office visit with the authorizing provider or a provider within the authorizing providers department within the previous 12 mos or has a future within next 30 days. See \"Patient Info\" tab in inbasket, or \"Choose Columns\" in Meds & Orders section of the refill encounter.              Passed - Medication is active on med list        Passed - Patient is age 18 or older             JACKELINE SALOMON RN 07/06/23 2:35 PM  "

## 2023-08-16 ENCOUNTER — TRANSFERRED RECORDS (OUTPATIENT)
Dept: HEALTH INFORMATION MANAGEMENT | Facility: CLINIC | Age: 58
End: 2023-08-16
Payer: COMMERCIAL

## 2023-09-29 ENCOUNTER — TRANSFERRED RECORDS (OUTPATIENT)
Dept: HEALTH INFORMATION MANAGEMENT | Facility: CLINIC | Age: 58
End: 2023-09-29
Payer: COMMERCIAL

## 2023-11-12 ENCOUNTER — HEALTH MAINTENANCE LETTER (OUTPATIENT)
Age: 58
End: 2023-11-12

## 2023-11-16 ENCOUNTER — OFFICE VISIT (OUTPATIENT)
Dept: INTERNAL MEDICINE | Facility: CLINIC | Age: 58
End: 2023-11-16
Payer: COMMERCIAL

## 2023-11-16 VITALS
SYSTOLIC BLOOD PRESSURE: 133 MMHG | WEIGHT: 303 LBS | RESPIRATION RATE: 16 BRPM | TEMPERATURE: 97.9 F | DIASTOLIC BLOOD PRESSURE: 82 MMHG | HEIGHT: 77 IN | BODY MASS INDEX: 35.78 KG/M2 | HEART RATE: 78 BPM | OXYGEN SATURATION: 98 %

## 2023-11-16 DIAGNOSIS — E11.69 DIABETES MELLITUS TYPE 2 IN OBESE: Primary | ICD-10-CM

## 2023-11-16 DIAGNOSIS — I10 ESSENTIAL HYPERTENSION WITH GOAL BLOOD PRESSURE LESS THAN 140/90: ICD-10-CM

## 2023-11-16 DIAGNOSIS — E66.9 DIABETES MELLITUS TYPE 2 IN OBESE: Primary | ICD-10-CM

## 2023-11-16 LAB — HBA1C MFR BLD: 6.7 % (ref 0–5.6)

## 2023-11-16 PROCEDURE — 36415 COLL VENOUS BLD VENIPUNCTURE: CPT | Performed by: INTERNAL MEDICINE

## 2023-11-16 PROCEDURE — 99213 OFFICE O/P EST LOW 20 MIN: CPT | Performed by: INTERNAL MEDICINE

## 2023-11-16 PROCEDURE — 83036 HEMOGLOBIN GLYCOSYLATED A1C: CPT | Performed by: INTERNAL MEDICINE

## 2023-11-16 RX ORDER — ASPIRIN 81 MG/1
1 TABLET ORAL DAILY
COMMUNITY
End: 2024-06-04

## 2023-11-16 RX ORDER — LISINOPRIL 5 MG/1
5 TABLET ORAL DAILY
Qty: 90 TABLET | Refills: 3 | Status: SHIPPED | OUTPATIENT
Start: 2023-11-16

## 2023-11-16 NOTE — PROGRESS NOTES
Office Visit - Follow Up   Walter Santa   58 year old male    Date of Visit: 11/16/2023    Chief Complaint   Patient presents with    Follow Up     Diabetes check, fasting.        -------------------------------------------------------------------------------------------------------------------------  Assessment and Plan    Follow-up multiple issues, Walter overall is doing well     Allergic conjunctivitis on the right side, will have Walter try some over-the-counter antihistamine eyedrop (example brand Zaditor)  Walter noticed some redness of his right side conjunctiva, which is what I see this morning of November 16, 2023.   Eye not painful.  He probably is experiencing allergic conjunctivitis from some airborne allergens since it is been very windy, and he said that his yard is full of leaves.    will have Walter swing by the lab this morning for A1c test.  He had aggressively controlled lipids when we checked in May 2023.    Prostate cancer, status post radical prostatectomy and lymph node dissection August 16, 2022  Detectable PSA as of August 2023, following up closely with urology, and if the PSA is rising, then Walter will be meeting with medical oncology    Post procedure incontinence, much improved as of May 2023, no longer needing to use an absorptive pad  follows-up with Dr. Gallego, might possibly consider going on tadalafil     PSA 0.2 as of August 15, recheck Nov 21, 2023 8-  NERVE SPARING ROBOTIC ASSISTED PROSTATECTOMY, LEFT PELVIC LYMPH NODE DISSECTION with Catalino Gallego MD  Walter's diagnosis started with an elevated PSA of about 15 from May 5, 2022.       Type 2 diabetes, has been on long-term metformin.  He told me he is due for an eye exam, he goes to [facility]  metFORMIN (GLUCOPHAGE) 1000 MG tablet        TAKE 1 TABLET BY MOUTH TWICE DAILY WITH MEALS      Latest Reference Range & Units 11/08/22 08:22   Hemoglobin A1C 0.0 - 5.6 % 7.2 (H)      Might possibly have some early  mild diabetic neuropathy symptoms, with some tingling and numbness in his toes--but he told me these are better as of May 2023     Arthroscopic right knee surgery to repair torn meniscus 2/10/2022 done at [facility]     Obesity with body mass index of 36    Wt Readings from Last 5 Encounters:   11/16/23 137.4 kg (303 lb)   05/09/23 138.1 kg (304 lb 8 oz)   03/31/23 140.6 kg (310 lb)   11/08/22 140.6 kg (310 lb)   08/16/22 139.7 kg (307 lb 14.4 oz)     Kidney stone, passed 1-7-2022 1-5-2022  CT Abdomen Pelvis w Contrast  IMPRESSION: 5 mm calculus at the left ureterovesical junction causes mild left hydronephrosis and hydroureter with a delayed nephrogram and perinephric infiltration.     Fully vaccinated and boosted for COVID-19, received his third shot of Pfizer October 2021. [facility] told him to bring his vaccination card with him, and apparently they're not requiring him to be tested.    Hypertension   5-9-2023 Reducing his dose of lisinopril from 10 down to 5 mg a day, since his blood pressure has been well controlled, maybe even a bit on the low side, and furthermore he plans to lose weight and were going into the hotter summer months, so that less lisinopril would probably be better in case he gets a little dehydrated.  But he should try to stay hydrated.     BP Readings from Last 6 Encounters:   11/16/23 133/82   05/09/23 106/66   11/08/22 120/72   08/17/22 125/83   08/02/22 132/80   05/05/22 124/83     Abnormal ECG, inferior infarct pattern seen since 2017, right bundle branch block present August 2, 2022  walks 10K steps a day  Non-smoker  No history cardiac events     8-2-2022  Sinus rhythm   Right bundle branch block   Inferior infarct (cited on or before 20-NOV-2017)   Abnormal ECG   When compared with ECG of 05-JAN-2022 05:23,   Right bundle branch block is now Present   Minimal criteria for Anterior infarct are no longer Present    Hyperlipidemia  atorvastatin (LIPITOR) 20 MG tablet     Take 1 tablet (20 mg total) by mouth daily.     5-5-2022  LDL Cholesterol Calculated <=129 mg/dL 34       Direct Measure HDL >=40 mg/dL 36 Low       Triglycerides <=149 mg/dL 90       Cholesterol <=199 mg/dL 88      Kidney stone, passed 1-7-2022 1-5-2022  CT Abdomen Pelvis w Contrast  IMPRESSION: 5 mm calculus at the left ureterovesical junction causes mild left hydronephrosis and hydroureter with a delayed nephrogram and perinephric infiltration.     Sleep apnea, mild  Walter was a study subject for the [place] study, comparing the performance of a smart watch to a medical grade home sleep test.  It indicated that Walter has maybe 30-40 apnea events per night, but this is deemed mild, and he is going to try managing without CPAP for the time being, but always the option of trying CPAP     Walter is going to have another try at screening colonoscopy December 1, 2023, after procedure was aborted September 29 because of inadequate prep.  Walter is going to do a 2-day prep ahead of December 1.      Colonoscopy 8/1/2013 was normal, recheck 10 years would correspond to 2023  Walter is good rescheduling his recheck colonoscopy for the summer 2023     Family history of cerebral aneurysm (mother) and abdominal aortic aneurysm (grandfather)   I told Walter he does not need to worry about these for himself, because he is aorta was included in a CT abdomen January 5, 2022, and he had a magnetic resonance angiogram of the brain with [facility] December 2013     History of shingles in an unusual location (palm of his right hand), occurred while he was in [place] February 2019     Already got seasonal flu shot for autumn 2023        --------------------------------------------------------------------------------------------------------------------------  History of Present Illness  This 58 year old old           History of Present Illness       Diabetes:   He presents for follow up of diabetes.  He is checking home blood  glucose a few times a week.   He checks blood glucose before meals.  Blood glucose is never over 200 and never under 70. He is aware of hypoglycemia symptoms including lethargy.    He has no concerns regarding his diabetes at this time.  He is having numbness in feet.  The patient has not had a diabetic eye exam in the last 12 months.          He eats 2-3 servings of fruits and vegetables daily.He consumes 0 sweetened beverage(s) daily.He exercises with enough effort to increase his heart rate 30 to 60 minutes per day.  He exercises with enough effort to increase his heart rate 7 days per week.   He is taking medications regularly.      Wt Readings from Last 3 Encounters:   11/16/23 137.4 kg (303 lb)   05/09/23 138.1 kg (304 lb 8 oz)   03/31/23 140.6 kg (310 lb)     BP Readings from Last 3 Encounters:   11/16/23 133/82   05/09/23 106/66   11/08/22 120/72       ---------------------------------------------------------------------------------------------------------------------------    Medications, Allergies, Social, and Problem List     Current Outpatient Medications   Medication Sig Dispense Refill    aspirin 81 MG EC tablet Take 1 tablet by mouth daily      atorvastatin (LIPITOR) 20 MG tablet TAKE ONE TABLET BY MOUTH ONCE DAILY 90 tablet 3    lisinopril (ZESTRIL) 5 MG tablet Take 1 tablet (5 mg) by mouth daily      metFORMIN (GLUCOPHAGE) 1000 MG tablet Take 1 tablet (1,000 mg) by mouth 2 times daily (with meals) 180 tablet 1     No Known Allergies  Social History     Tobacco Use    Smoking status: Never     Passive exposure: Past    Smokeless tobacco: Never   Vaping Use    Vaping Use: Never used     Patient Active Problem List   Diagnosis    Hyperlipidemia LDL goal <100    Diabetes mellitus type 2 in obese (H)    Attention Deficit Disorder Without Hyperactivity    Obesity (BMI 35.0-39.9) with comorbidity (H)    Essential hypertension with goal blood pressure less than 140/90    Abnormal electrocardiogram     "Prostate cancer (H)    S/P prostatectomy        Reviewed, reconciled and updated       Physical Exam   General Appearance:       /82   Pulse 78   Temp 97.9  F (36.6  C)   Resp 16   Ht 1.956 m (6' 5\")   Wt 137.4 kg (303 lb)   SpO2 98%   BMI 35.93 kg/m      Walter appears well today.  Blood pressure well controlled  Slight redness conjunctiva right eye, no discharge     Additional Information   I spent 20 minutes on this encounter, including reviewing interval history since last visit, examining the patient, explaining and counseling the issues enumerated in the Assessment and Plan (patient given a copy), ordering indicated tests, ordering prescriptions       TRISTON CALVIN MD, MD            "

## 2023-11-21 ENCOUNTER — TRANSFERRED RECORDS (OUTPATIENT)
Dept: HEALTH INFORMATION MANAGEMENT | Facility: CLINIC | Age: 58
End: 2023-11-21
Payer: COMMERCIAL

## 2023-12-01 ENCOUNTER — TRANSFERRED RECORDS (OUTPATIENT)
Dept: HEALTH INFORMATION MANAGEMENT | Facility: CLINIC | Age: 58
End: 2023-12-01
Payer: COMMERCIAL

## 2024-03-05 ENCOUNTER — TRANSFERRED RECORDS (OUTPATIENT)
Dept: HEALTH INFORMATION MANAGEMENT | Facility: CLINIC | Age: 59
End: 2024-03-05
Payer: COMMERCIAL

## 2024-03-15 ENCOUNTER — TRANSFERRED RECORDS (OUTPATIENT)
Dept: HEALTH INFORMATION MANAGEMENT | Facility: CLINIC | Age: 59
End: 2024-03-15
Payer: COMMERCIAL

## 2024-05-20 ENCOUNTER — OFFICE VISIT (OUTPATIENT)
Dept: FAMILY MEDICINE | Facility: CLINIC | Age: 59
End: 2024-05-20
Payer: COMMERCIAL

## 2024-05-20 DIAGNOSIS — L91.8 SKIN TAG: ICD-10-CM

## 2024-05-20 DIAGNOSIS — L82.1 SK (SEBORRHEIC KERATOSIS): ICD-10-CM

## 2024-05-20 DIAGNOSIS — Z92.3 HISTORY OF THERAPEUTIC RADIATION: ICD-10-CM

## 2024-05-20 DIAGNOSIS — D18.01 CHERRY ANGIOMA: ICD-10-CM

## 2024-05-20 DIAGNOSIS — D22.9 MULTIPLE BENIGN NEVI: Primary | ICD-10-CM

## 2024-05-20 DIAGNOSIS — L58.9 RADIATION DERMATITIS: ICD-10-CM

## 2024-05-20 DIAGNOSIS — L81.4 LENTIGINES: ICD-10-CM

## 2024-05-20 PROCEDURE — 99203 OFFICE O/P NEW LOW 30 MIN: CPT | Performed by: PHYSICIAN ASSISTANT

## 2024-05-20 RX ORDER — LEUPROLIDE ACETATE 22.5 MG
KIT SUBCUTANEOUS
COMMUNITY
Start: 2024-03-05

## 2024-05-20 ASSESSMENT — PAIN SCALES - GENERAL: PAINLEVEL: NO PAIN (0)

## 2024-05-20 NOTE — PROGRESS NOTES
[facility] Dermatology Note  Encounter Date: May 20, 2024  Office Visit      Dermatology Problem List:  FBSE: 5/20/24  1. Radiation dermatitis  - Tx: OTC Gentle moisturizer.     PMHx: Radiation - prostate cancer - undergoing radiation currently. DM type 2    Social: Works security for [employer]  ____________________________________________    Assessment & Plan:    # Skin tags, bilat axilla & Neck  - Recommended OTC skin tag removal treatments  - If they become bother some discussed use of cryotherapy.     # Radiation therapy, currently under going for Prostate cancer  - Continue FBSE every 12 months  - Discussed increased risk of skin cancers in patients on chronic radiation.  - Counseled regarding photoprotection (SPF30+ broad spectrum sun screen, UPF clothing, broad brimmed hats, avoidance of sunlight during peak hours ~11a-3p)\    # Radiation dermatitis  - Please start a daily moisturizer to your skin. Some good examples are: Cetaphil, Vanicream, Cerave ( in a jar ).  - if increasingly becomes bothersome/symptomatic will contact clinic     # Benign findings: multiple benign nevi, lentigines, cherry angiomas, SKs  - edu on benign etiology  - Signs and Symptoms of non-melanoma skin cancer and ABCDEs of melanoma reviewed with patient. Patient encouraged to perform monthly self skin exams and educated on how to perform them. UV precautions reviewed with patient. Patient was asked about new or changing moles/lesions on body.   - Sunscreen: Apply 20 minutes prior to going outdoors and reapply every two hours, when wet or sweating. We recommend using an SPF 30 or higher, and to use one that is water resistant.     - RTC for changes    Procedures Performed:   None    Follow-up: 1 year(s) in-person, or earlier for new or changing lesions    Staff and scribe:    Scribe Disclosure:   JULIO SMILEY, am serving as a scribe; to document services personally performed by Isabel Burns PA-C  -based on data collection and the provider's statements to me.     Provider Disclosure:  I agree with above History, Review of Systems, Physical exam and Plan.  I have reviewed the content of the documentation and have edited it as needed. I have personally performed the services documented here and the documentation accurately represents those services and the decisions I have made.      Electronically signed by:    All risks, benefits and alternatives were discussed with patient.  Patient is in agreement and understands the assessment and plan.  All questions were answered.    Isabel Burns PA-C, MPAS  [facility]: Phone: [phone], Fax: [phone]  [facility]: Phone: [phone],  Fax: [phone]  [facility]: Phone: [phone], Fax: [phone]  ____________________________________________    CC: Consult (FBSC/No concerns/Current radiation therapy for Prostate ca)      Reviewed patients past medical history and pertinent chart review prior to patient's visit today.     HPI:  Mr. Walter Santa is a 59 year old male who presents today as a new patient for [facility].     Today patient did not report any spots of concern.      Noted that he is currently going through radiation therapy for prostate cancer. Sometimes skin there gets irritated, but not particularly bothersome at this time.     Patient is otherwise feeling well, without additional concerns.    Labs:  N/A    Physical Exam:  Vitals: There were no vitals taken for this visit.  SKIN: Full skin, which includes the head/face, both arms, chest, back, abdomen,both legs, genitalia and/or groin buttocks, digits and/or nails, was examined.   -  Salazar's skin type II, has <100 nevi  - There are dome shaped bright red papules on the trunk.   - Multiple regular brown pigmented macules and papules are identified on the trunk and extremities.    - Scattered brown macules on sun exposed areas.  - There are waxy stuck on tan to brown papules on the trunk.    - defers groin exam.  - No other lesions of concern on areas examined.     Medications:  Current Outpatient Medications   Medication Sig Dispense Refill    leuprolide, 3 Month, (ELIGARD) 22.5 MG Inject 22.5 mg by subcutaneous route.      aspirin 81 MG EC tablet Take 1 tablet by mouth daily      atorvastatin (LIPITOR) 20 MG tablet TAKE ONE TABLET BY MOUTH ONCE DAILY 90 tablet 3    lisinopril (ZESTRIL) 5 MG tablet Take 1 tablet (5 mg) by mouth daily 90 tablet 3    metFORMIN (GLUCOPHAGE) 1000 MG tablet Take 1 tablet (1,000 mg) by mouth 2 times daily (with meals) 180 tablet 3     No current facility-administered medications for this visit.      Past Medical/Surgical History:   Patient Active Problem List   Diagnosis    Hyperlipidemia LDL goal <100    Type 2 diabetes mellitus with obesity (H)    Attention Deficit Disorder Without Hyperactivity    Obesity (BMI 35.0-39.9) with comorbidity (H)    Essential hypertension with goal blood pressure less than 140/90    Abnormal electrocardiogram    Prostate cancer (H)    S/P prostatectomy     History reviewed. No pertinent past medical history.

## 2024-05-20 NOTE — LETTER
5/20/2024         RE: Walter Santa  [address] [address] [place] [address]        Dear Colleague,    Thank you for referring your patient, Walter Santa, to the [facility]. Please see a copy of my visit note below.    [facility] Dermatology Note  Encounter Date: May 20, 2024  Office Visit      Dermatology Problem List:  FBSE: 5/20/24  1. Radiation dermatitis  - Tx: OTC Gentle moisturizer.     PMHx: Radiation - prostate cancer - undergoing radiation currently. 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     # Radiation therapy, currently under going for Prostate cancer  - Continue FBSE every 12 months  - Discussed increased risk of skin cancers in patients on chronic radiation.  - Counseled regarding photoprotection (SPF30+ broad spectrum sun screen, UPF clothing, broad brimmed hats, avoidance of sunlight during peak hours ~11a-3p)\    # Radiation dermatitis  - Please start a daily moisturizer to your skin. Some good examples are: Cetaphil, Vanicream, Cerave ( in a jar ).  - if increasingly becomes bothersome/symptomatic will contact clinic     # Benign findings: multiple benign nevi, lentigines, cherry angiomas, SKs  - edu on benign etiology  - Signs and Symptoms of non-melanoma skin cancer and ABCDEs of melanoma reviewed with patient. Patient encouraged to perform monthly self skin exams and educated on how to perform them. UV precautions reviewed with patient. Patient was asked about new or changing moles/lesions on body.   - Sunscreen: Apply 20 minutes prior to going outdoors and reapply every two hours, when wet or sweating. We recommend using an SPF 30 or higher, and to use one that is water resistant.     - RTC for  changes    Procedures Performed:   None    Follow-up: 1 year(s) in-person, or earlier for new or changing lesions    Staff and scribe:    Scribe Disclosure:   I, JULIO BARAJAS, am serving as a scribe; to document services personally performed by Isabel Burns PA-C -based on data collection and the provider's statements to me.     Provider Disclosure:  I agree with above History, Review of Systems, Physical exam and Plan.  I have reviewed the content of the documentation and have edited it as needed. I have personally performed the services documented here and the documentation accurately represents those services and the decisions I have made.      Electronically signed by:    All risks, benefits and alternatives were discussed with patient.  Patient is in agreement and understands the assessment and plan.  All questions were answered.    Isabel Burns PA-C, [facility]  [facility]: Phone: [phone], Fax: [phone]  [facility]: Phone: [phone],  Fax: [phone]  [facility]: Phone: [phone], Fax: [phone]  ____________________________________________    CC: Consult (FBSC/No concerns/Current radiation therapy for Prostate ca)      Reviewed patients past medical history and pertinent chart review prior to patient's visit today.     HPI:  Mr. Walter Santa is a 59 year old male who presents today as a new patient for [facility].     Today patient did not report any spots of concern.      Noted that he is currently going through radiation therapy for prostate cancer. Sometimes skin there gets irritated, but not particularly bothersome at this time.     Patient is otherwise feeling well, without additional concerns.    Labs:  N/A    Physical Exam:  Vitals: There were no vitals taken for this visit.  SKIN: Full skin, which includes the head/face, both arms, chest, back, abdomen,both legs,  genitalia and/or groin buttocks, digits and/or nails, was examined.   -  Salazar's skin type II, has <100 nevi  - There are dome shaped bright red papules on the trunk.   - Multiple regular brown pigmented macules and papules are identified on the trunk and extremities.   - Scattered brown macules on sun exposed areas.  - There are waxy stuck on tan to brown papules on the trunk.    - defers groin exam.  - No other lesions of concern on areas examined.     Medications:  Current Outpatient Medications   Medication Sig Dispense Refill     leuprolide, 3 Month, (ELIGARD) 22.5 MG Inject 22.5 mg by subcutaneous route.       aspirin 81 MG EC tablet Take 1 tablet by mouth daily       atorvastatin (LIPITOR) 20 MG tablet TAKE ONE TABLET BY MOUTH ONCE DAILY 90 tablet 3     lisinopril (ZESTRIL) 5 MG tablet Take 1 tablet (5 mg) by mouth daily 90 tablet 3     metFORMIN (GLUCOPHAGE) 1000 MG tablet Take 1 tablet (1,000 mg) by mouth 2 times daily (with meals) 180 tablet 3     No current facility-administered medications for this visit.      Past Medical/Surgical History:   Patient Active Problem List   Diagnosis     Hyperlipidemia LDL goal <100     Type 2 diabetes mellitus with obesity (H)     Attention Deficit Disorder Without Hyperactivity     Obesity (BMI 35.0-39.9) with comorbidity (H)     Essential hypertension with goal blood pressure less than 140/90     Abnormal electrocardiogram     Prostate cancer (H)     S/P prostatectomy     History reviewed. No pertinent past medical history.                     Again, thank you for allowing me to participate in the care of your patient.        Sincerely,        Isabel Burns PA-C

## 2024-05-31 ENCOUNTER — TRANSFERRED RECORDS (OUTPATIENT)
Dept: HEALTH INFORMATION MANAGEMENT | Facility: CLINIC | Age: 59
End: 2024-05-31
Payer: COMMERCIAL

## 2024-05-31 SDOH — HEALTH STABILITY: PHYSICAL HEALTH: ON AVERAGE, HOW MANY MINUTES DO YOU ENGAGE IN EXERCISE AT THIS LEVEL?: 60 MIN

## 2024-05-31 SDOH — HEALTH STABILITY: PHYSICAL HEALTH: ON AVERAGE, HOW MANY DAYS PER WEEK DO YOU ENGAGE IN MODERATE TO STRENUOUS EXERCISE (LIKE A BRISK WALK)?: 7 DAYS

## 2024-05-31 ASSESSMENT — SOCIAL DETERMINANTS OF HEALTH (SDOH): HOW OFTEN DO YOU GET TOGETHER WITH FRIENDS OR RELATIVES?: THREE TIMES A WEEK

## 2024-06-04 ENCOUNTER — OFFICE VISIT (OUTPATIENT)
Dept: INTERNAL MEDICINE | Facility: CLINIC | Age: 59
End: 2024-06-04
Payer: COMMERCIAL

## 2024-06-04 VITALS
HEART RATE: 75 BPM | TEMPERATURE: 97.3 F | SYSTOLIC BLOOD PRESSURE: 116 MMHG | WEIGHT: 310 LBS | OXYGEN SATURATION: 96 % | DIASTOLIC BLOOD PRESSURE: 68 MMHG | HEIGHT: 77 IN | BODY MASS INDEX: 36.6 KG/M2 | RESPIRATION RATE: 18 BRPM

## 2024-06-04 DIAGNOSIS — Z90.79 S/P PROSTATECTOMY: ICD-10-CM

## 2024-06-04 DIAGNOSIS — E11.69 TYPE 2 DIABETES MELLITUS WITH OBESITY (H): ICD-10-CM

## 2024-06-04 DIAGNOSIS — I10 ESSENTIAL HYPERTENSION WITH GOAL BLOOD PRESSURE LESS THAN 140/90: ICD-10-CM

## 2024-06-04 DIAGNOSIS — C61 PROSTATE CANCER (H): ICD-10-CM

## 2024-06-04 DIAGNOSIS — E78.5 HYPERLIPIDEMIA LDL GOAL <100: ICD-10-CM

## 2024-06-04 DIAGNOSIS — E66.9 TYPE 2 DIABETES MELLITUS WITH OBESITY (H): ICD-10-CM

## 2024-06-04 DIAGNOSIS — E66.01 MORBID OBESITY (H): ICD-10-CM

## 2024-06-04 DIAGNOSIS — Z00.00 ROUTINE GENERAL MEDICAL EXAMINATION AT A HEALTH CARE FACILITY: Primary | ICD-10-CM

## 2024-06-04 LAB
ALBUMIN SERPL BCG-MCNC: 4.5 G/DL (ref 3.5–5.2)
ALP SERPL-CCNC: 63 U/L (ref 40–150)
ALT SERPL W P-5'-P-CCNC: 41 U/L (ref 0–70)
ANION GAP SERPL CALCULATED.3IONS-SCNC: 10 MMOL/L (ref 7–15)
AST SERPL W P-5'-P-CCNC: 26 U/L (ref 0–45)
BILIRUB SERPL-MCNC: 0.7 MG/DL
BUN SERPL-MCNC: 16.5 MG/DL (ref 8–23)
CALCIUM SERPL-MCNC: 9.6 MG/DL (ref 8.8–10.2)
CHLORIDE SERPL-SCNC: 104 MMOL/L (ref 98–107)
CHOLEST SERPL-MCNC: 105 MG/DL
CREAT SERPL-MCNC: 0.88 MG/DL (ref 0.67–1.17)
DEPRECATED HCO3 PLAS-SCNC: 26 MMOL/L (ref 22–29)
EGFRCR SERPLBLD CKD-EPI 2021: >90 ML/MIN/1.73M2
ERYTHROCYTE [DISTWIDTH] IN BLOOD BY AUTOMATED COUNT: 13 % (ref 10–15)
FASTING STATUS PATIENT QL REPORTED: YES
FASTING STATUS PATIENT QL REPORTED: YES
GLUCOSE SERPL-MCNC: 156 MG/DL (ref 70–99)
HBA1C MFR BLD: 6.9 % (ref 0–5.6)
HCT VFR BLD AUTO: 36.4 % (ref 40–53)
HDLC SERPL-MCNC: 46 MG/DL
HGB BLD-MCNC: 12.8 G/DL (ref 13.3–17.7)
LDLC SERPL CALC-MCNC: 39 MG/DL
MCH RBC QN AUTO: 31.4 PG (ref 26.5–33)
MCHC RBC AUTO-ENTMCNC: 35.2 G/DL (ref 31.5–36.5)
MCV RBC AUTO: 89 FL (ref 78–100)
NONHDLC SERPL-MCNC: 59 MG/DL
PLATELET # BLD AUTO: 138 10E3/UL (ref 150–450)
POTASSIUM SERPL-SCNC: 4.5 MMOL/L (ref 3.4–5.3)
PROT SERPL-MCNC: 6.9 G/DL (ref 6.4–8.3)
RBC # BLD AUTO: 4.07 10E6/UL (ref 4.4–5.9)
SODIUM SERPL-SCNC: 140 MMOL/L (ref 135–145)
TRIGL SERPL-MCNC: 100 MG/DL
TSH SERPL DL<=0.005 MIU/L-ACNC: 1.61 UIU/ML (ref 0.3–4.2)
WBC # BLD AUTO: 3.8 10E3/UL (ref 4–11)

## 2024-06-04 PROCEDURE — 99396 PREV VISIT EST AGE 40-64: CPT | Performed by: INTERNAL MEDICINE

## 2024-06-04 PROCEDURE — 84443 ASSAY THYROID STIM HORMONE: CPT | Performed by: INTERNAL MEDICINE

## 2024-06-04 PROCEDURE — 36415 COLL VENOUS BLD VENIPUNCTURE: CPT | Performed by: INTERNAL MEDICINE

## 2024-06-04 PROCEDURE — 80053 COMPREHEN METABOLIC PANEL: CPT | Performed by: INTERNAL MEDICINE

## 2024-06-04 PROCEDURE — 80061 LIPID PANEL: CPT | Performed by: INTERNAL MEDICINE

## 2024-06-04 PROCEDURE — 99214 OFFICE O/P EST MOD 30 MIN: CPT | Mod: 25 | Performed by: INTERNAL MEDICINE

## 2024-06-04 PROCEDURE — 85027 COMPLETE CBC AUTOMATED: CPT | Performed by: INTERNAL MEDICINE

## 2024-06-04 PROCEDURE — 83036 HEMOGLOBIN GLYCOSYLATED A1C: CPT | Performed by: INTERNAL MEDICINE

## 2024-06-04 RX ORDER — ATORVASTATIN CALCIUM 20 MG/1
20 TABLET, FILM COATED ORAL DAILY
Qty: 90 TABLET | Refills: 3 | Status: SHIPPED | OUTPATIENT
Start: 2024-06-04

## 2024-06-04 NOTE — PATIENT INSTRUCTIONS
Annual preventive exam, overall Walter is doing well, but has been very busy with health issues over the last year.  He just finished 38 fractions of external beam radiation as of last week May 30, 2024.  He continues on Eligard androgen suppression, and is experiencing the expected side effects of hot flashes, may be also gaining some body fat.  He is going to combat that by implementing a strength training program, and being extra careful with his diet.  He got his colon polyp removed December 1, 2023, but needs a recheck in 3 years.    Will have Walter swing by the laboratory this morning for comprehensive metabolic panel, blood cell counts, TSH for thyroid, A1c for diabetes, and lipid panel.  PSAs are checked at his urology clinic.  No changes to his medications.    Prostate cancer, status post radical prostatectomy and lymph node dissection August 16, 2022  Completed 38 fractions of external beam radiation at [facility] (with additional field involvement of pelvic lymph nodes) as of May 30, 2024  Walter will continue on hormonal therapy with Eligard throughout 2024 and probably 2025   Gets hot flashes  Post procedure incontinence better, but Walter still has a lot of frequency  follows-up with Dr. Gallego, might possibly consider going on tadalafil     PSA are monitored by Dr Gallego's team  PSA did help detectable as of March 2024 8-  NERVE SPARING ROBOTIC ASSISTED PROSTATECTOMY, LEFT PELVIC LYMPH NODE DISSECTION with Catalino Gallego MD  Walter's diagnosis started with an elevated PSA of about 15 from May 5, 2022.       Type 2 diabetes, has been on long-term metformin  I think Walter could be a candidate for Ozempic, which would be added on top of metformin.  I reminded Walter that the main physiologic action of Ozempic is to feel murphy and have less craving for food, and ultimately to eat less  I think he could make a lot of progress with dietary improvements, strength training building  lean muscle mass.  He will let me know if he is interested in starting Ozempic which consists of weekly subcutaneous injections that he self administers    He told me he is due for an eye exam, he goes to [facility]  metFORMIN (GLUCOPHAGE) 1000 MG tablet        TAKE 1 TABLET BY MOUTH TWICE DAILY WITH MEALS    11-  Hemoglobin A1C  0.0 - 5.6 % 6.7 High       Great, he has an eye exam coming up July 2024    Might possibly have some early mild diabetic neuropathy symptoms, with some tingling and numbness in his toes    Arthroscopic right knee surgery to repair torn meniscus 2/10/2022 done at [facility]     Obesity with body mass index of 36  NEEDS TO DO STRENGTH TRAINING to maintain (hopefully build) muscle mass, since he is on androgen suppression     Wt Readings from Last 5 Encounters:   06/04/24 140.6 kg (310 lb)   11/16/23 137.4 kg (303 lb)   05/09/23 138.1 kg (304 lb 8 oz)   03/31/23 140.6 kg (310 lb)   11/08/22 140.6 kg (310 lb)     Kidney stone, passed 1-7-2022 1-5-2022  CT Abdomen Pelvis w Contrast  IMPRESSION: 5 mm calculus at the left ureterovesical junction causes mild left hydronephrosis and hydroureter with a delayed nephrogram and perinephric infiltration.     Fully vaccinated and boosted for COVID-19, received his third shot of Pfizer October 2021. [facility] told him to bring his vaccination card with him, and apparently they're not requiring him to be tested.    Hypertension   5-9-2023 Reduced his dose of lisinopril from 10 down to 5 mg a day,  BP Readings from Last 6 Encounters:   06/04/24 116/68   11/16/23 133/82   05/09/23 106/66   11/08/22 120/72   08/17/22 125/83   08/02/22 132/80     Abnormal ECG, inferior infarct pattern seen since 2017, right bundle branch block present August 2, 2022  walks 10K steps a day  Non-smoker  No history cardiac events     8-2-2022  Sinus rhythm   Right bundle branch block   Inferior infarct (cited on or before 20-NOV-2017)   Abnormal ECG    When compared with ECG of 05-JAN-2022 05:23,   Right bundle branch block is now Present   Minimal criteria for Anterior infarct are no longer Present    Hyperlipidemia  atorvastatin (LIPITOR) 20 MG tablet    Take 1 tablet (20 mg total) by mouth daily.     5-5-2022  LDL Cholesterol Calculated <=129 mg/dL 34       Direct Measure HDL >=40 mg/dL 36 Low       Triglycerides <=149 mg/dL 90       Cholesterol <=199 mg/dL 88      Kidney stone, passed 1-7-2022 1-5-2022  CT Abdomen Pelvis w Contrast  IMPRESSION: 5 mm calculus at the left ureterovesical junction causes mild left hydronephrosis and hydroureter with a delayed nephrogram and perinephric infiltration.     Sleep apnea, mild, not using CPAP  Walter was a study subject for the [place] study, comparing the performance of a smart watch to a medical grade home sleep test.  It indicated that Walter has maybe 30-40 apnea events per night, but this is deemed mild, and he is going to try managing without CPAP for the time being, but always the option of trying CPAP     Walter is going to have another try at screening colonoscopy December 1, 2023, after procedure was aborted September 29 because of inadequate prep.  Walter is going to do a 2-day prep ahead of December 1.       Advanced by size colon polyp 10 mm removed December 1, 2023, Walter will get a 3-year recheck which would correspond to December 2026     Family history of cerebral aneurysm (mother) and abdominal aortic aneurysm (grandfather)   I told Walter he does not need to worry about these for himself, because he is aorta was included in a CT abdomen January 5, 2022, and he had a magnetic resonance angiogram of the brain with [facility] December 2013    History of shingles in an unusual location (palm of his right hand), occurred while he was in [place] February 2019     Immunization History   Administered Date(s) Administered    COVID-19 MONOVALENT 12+ (Pfizer) 12/21/2020, 01/08/2021, 10/18/2021    COVID-19  Monovalent 12+ (Pfizer 2022) 05/05/2022    Influenza Vaccine 18-64 (Flublok) 09/16/2021, 09/26/2022    Influenza Vaccine, 6+MO IM (QUADRIVALENT W/PRESERVATIVES) 09/22/2009    Pneumococcal 23 valent 03/10/2009    TD,PF 7+ (Tenivac) 11/20/2017    TDAP (Adacel,Boostrix) 03/10/2008    Td (Adult), Adsorbed 01/06/2000    Td,adult,historic,unspecified 01/06/2000

## 2024-06-04 NOTE — PROGRESS NOTES
Preventive Care Visit  [facility]  TRISTON CALVIN MD, Internal Medicine  Jun 4, 2024    Ronni Watson is a 59 year old, presenting for the following:  Physical (Fasting. )        6/4/2024     6:55 AM   Additional Questions   Roomed by Maggie HOOVER   Accompanied by [email or web address]        Health Care Directive  Patient does not have a Health Care Directive or Living Will: Discussed advance care planning with patient; information given to patient to review.    HPI        5/31/2024   General Health   How would you rate your overall physical health? Good   Feel stress (tense, anxious, or unable to sleep) Not at all         5/31/2024   Nutrition   Three or more servings of calcium each day? Yes   Diet: Diabetic   How many servings of fruit and vegetables per day? (!) 2-3   How many sweetened beverages each day? 0-1         5/31/2024   Exercise   Days per week of moderate/strenous exercise 7 days   Average minutes spent exercising at this level 60 min         5/31/2024   Social Factors   Frequency of gathering with friends or relatives Three times a week   Worry food won't last until get money to buy more No   Food not last or not have enough money for food? No   Do you have housing?  Yes   Are you worried about losing your housing? No   Lack of transportation? No   Unable to get utilities (heat,electricity)? No         5/31/2024   Fall Risk   Fallen 2 or more times in the past year? No   Trouble with walking or balance? No          5/31/2024   Dental   Dentist two times every year? Yes         5/31/2024   TB Screening   Were you born outside of the [place]? No     Today's PHQ-2 Score:       6/4/2024     6:52 AM   PHQ-2 ( 1999 Pfizer)   Q1: Little interest or pleasure in doing things 0   Q2: Feeling down, depressed or hopeless 0   PHQ-2 Score 0   Q1: Little interest or pleasure in doing things Not at all   Q2: Feeling down, depressed or hopeless Not at all   PHQ-2 Score 0         5/31/2024   Substance Use   Alcohol  "more than 3/day or more than 7/wk No   Do you use any other substances recreationally? (!) ALCOHOL     Social History     Tobacco Use    Smoking status: Never     Passive exposure: Past    Smokeless tobacco: Never   Vaping Use    Vaping status: Never Used         5/31/2024   One time HIV Screening   Previous HIV test? No         5/31/2024   STI Screening   New sexual partner(s) since last STI/HIV test? No   Last PSA:   Prostate Specific Antigen Screen   Date Value Ref Range Status   05/05/2022 15.37 (H) 0.00 - 3.50 ug/L Final     ASCVD Risk   The ASCVD Risk score (Suni WATSON, et al., 2019) failed to calculate for the following reasons:    The valid total cholesterol range is 130 to 320 mg/dL    Reviewed and updated as needed this visit by Provider         Objective    Exam  /68   Pulse 75   Temp 97.3  F (36.3  C)   Resp 18   Ht 1.956 m (6' 5\")   Wt 140.6 kg (310 lb)   SpO2 96%   BMI 36.76 kg/m     Estimated body mass index is 36.76 kg/m  as calculated from the following:    Height as of this encounter: 1.956 m (6' 5\").    Weight as of this encounter: 140.6 kg (310 lb).    Physical Exam    General: Alert, in no distress  Skin: No significant lesion seen.  Eyes/nose/throat: Eyes without scleral icterus, eye movements normal, pupils equal and reactive, oropharynx clear, ears with normal TM's  MSK: Neck with good ROM  Lymphatic: Neck without adenopathy or masses  Endocrine: Thyroid with no nodules to palpation  Pulm: Lungs clear to auscultation bilaterally  Cardiac: Heart with regular rate and rhythm, no murmur or gallop  GI: Abdomen soft, nontender. No palpable enlargement of liver or spleen  MSK: Extremities no tenderness or edema  Neuro: Moves all extremities, without focal weakness  Psych: Alert, normal mental status. Normal affect and speech    Diabetic foot exam notable for good pulses, normal tissue integrity.  May be his left leg and ankle are slightly larger than the right, but I would not " call it edematous  Nylon monofilament testing reports slightly reduced sensation left foot      ASSESSMENT AND PLAN    Annual preventive exam, overall Walter is doing well, but has been very busy with health issues over the last year.  He just finished 38 fractions of external beam radiation as of last week May 30, 2024.  He continues on Eligard androgen suppression, and is experiencing the expected side effects of hot flashes, may be also gaining some body fat.  He is going to combat that by implementing a strength training program, and being extra careful with his diet.  He got his colon polyp removed December 1, 2023, but needs a recheck in 3 years.    Will have Walter swing by the laboratory this morning for comprehensive metabolic panel, blood cell counts, TSH for thyroid, A1c for diabetes, and lipid panel.  PSAs are checked at his urology clinic.  No changes to his medications.    Prostate cancer, status post radical prostatectomy and lymph node dissection August 16, 2022  Completed 38 fractions of external beam radiation at [facility] (with additional field involvement of pelvic lymph nodes) as of May 30, 2024  Walter will continue on hormonal therapy with Eligard throughout 2024 and probably 2025   Gets hot flashes  Post procedure incontinence better, but Walter still has a lot of frequency  follows-up with Dr. Gallego, might possibly consider going on tadalafil     PSA are monitored by Dr Gallego's team  PSA did help detectable as of March 2024 8-  NERVE SPARING ROBOTIC ASSISTED PROSTATECTOMY, LEFT PELVIC LYMPH NODE DISSECTION with Catalino Gallego MD  Walter's diagnosis started with an elevated PSA of about 15 from May 5, 2022.       Type 2 diabetes, has been on long-term metformin  I think Walter could be a candidate for Ozempic, which would be added on top of metformin.  I reminded Walter that the main physiologic action of Ozempic is to feel murphy and have less craving for food, and  ultimately to eat less  I think he could make a lot of progress with dietary improvements, strength training building lean muscle mass.  He will let me know if he is interested in starting Ozempic which consists of weekly subcutaneous injections that he self administers    He told me he is due for an eye exam, he goes to [facility]  metFORMIN (GLUCOPHAGE) 1000 MG tablet        TAKE 1 TABLET BY MOUTH TWICE DAILY WITH MEALS    11-  Hemoglobin A1C  0.0 - 5.6 % 6.7 High       Great, he has an eye exam coming up July 2024    Might possibly have some early mild diabetic neuropathy symptoms, with some tingling and numbness in his toes    Arthroscopic right knee surgery to repair torn meniscus 2/10/2022 done at [facility]     Obesity with body mass index of 36  NEEDS TO DO STRENGTH TRAINING to maintain (hopefully build) muscle mass, since he is on androgen suppression     Wt Readings from Last 5 Encounters:   06/04/24 140.6 kg (310 lb)   11/16/23 137.4 kg (303 lb)   05/09/23 138.1 kg (304 lb 8 oz)   03/31/23 140.6 kg (310 lb)   11/08/22 140.6 kg (310 lb)     Kidney stone, passed 1-7-2022 1-5-2022  CT Abdomen Pelvis w Contrast  IMPRESSION: 5 mm calculus at the left ureterovesical junction causes mild left hydronephrosis and hydroureter with a delayed nephrogram and perinephric infiltration.     Fully vaccinated and boosted for COVID-19, received his third shot of Pfizer October 2021. [facility] told him to bring his vaccination card with him, and apparently they're not requiring him to be tested.    Hypertension   5-9-2023 Reduced his dose of lisinopril from 10 down to 5 mg a day,  BP Readings from Last 6 Encounters:   06/04/24 116/68   11/16/23 133/82   05/09/23 106/66   11/08/22 120/72   08/17/22 125/83   08/02/22 132/80     Abnormal ECG, inferior infarct pattern seen since 2017, right bundle branch block present August 2, 2022  walks 10K steps a day  Non-smoker  No history cardiac events      8-2-2022  Sinus rhythm   Right bundle branch block   Inferior infarct (cited on or before 20-NOV-2017)   Abnormal ECG   When compared with ECG of 05-JAN-2022 05:23,   Right bundle branch block is now Present   Minimal criteria for Anterior infarct are no longer Present    Hyperlipidemia  atorvastatin (LIPITOR) 20 MG tablet    Take 1 tablet (20 mg total) by mouth daily.     5-5-2022  LDL Cholesterol Calculated <=129 mg/dL 34       Direct Measure HDL >=40 mg/dL 36 Low       Triglycerides <=149 mg/dL 90       Cholesterol <=199 mg/dL 88      Kidney stone, passed 1-7-2022 1-5-2022  CT Abdomen Pelvis w Contrast  IMPRESSION: 5 mm calculus at the left ureterovesical junction causes mild left hydronephrosis and hydroureter with a delayed nephrogram and perinephric infiltration.     Sleep apnea, mild, not using CPAP  Walter was a study subject for the [place] study, comparing the performance of a smart watch to a medical grade home sleep test.  It indicated that Walter has maybe 30-40 apnea events per night, but this is deemed mild, and he is going to try managing without CPAP for the time being, but always the option of trying CPAP     Walter is going to have another try at screening colonoscopy December 1, 2023, after procedure was aborted September 29 because of inadequate prep.  Walter is going to do a 2-day prep ahead of December 1.       Advanced by size colon polyp 10 mm removed December 1, 2023, Walter will get a 3-year recheck which would correspond to December 2026     Family history of cerebral aneurysm (mother) and abdominal aortic aneurysm (grandfather)   I told Walter he does not need to worry about these for himself, because he is aorta was included in a CT abdomen January 5, 2022, and he had a magnetic resonance angiogram of the brain with [facility] December 2013    History of shingles in an unusual location (palm of his right hand), occurred while he was in [place] February 2019     Immunization History    Administered Date(s) Administered    COVID-19 MONOVALENT 12+ (Pfizer) 12/21/2020, 01/08/2021, 10/18/2021    COVID-19 Monovalent 12+ (Pfizer 2022) 05/05/2022    Influenza Vaccine 18-64 (Flublok) 09/16/2021, 09/26/2022    Influenza Vaccine, 6+MO IM (QUADRIVALENT W/PRESERVATIVES) 09/22/2009    Pneumococcal 23 valent 03/10/2009    TD,PF 7+ (Tenivac) 11/20/2017    TDAP (Adacel,Boostrix) 03/10/2008    Td (Adult), Adsorbed 01/06/2000    Td,adult,historic,unspecified 01/06/2000       Signed Electronically by: TRISTON CALVIN MD

## 2024-06-07 ENCOUNTER — TRANSFERRED RECORDS (OUTPATIENT)
Dept: HEALTH INFORMATION MANAGEMENT | Facility: CLINIC | Age: 59
End: 2024-06-07
Payer: COMMERCIAL

## 2024-10-22 DIAGNOSIS — M25.562 LEFT KNEE PAIN: Primary | ICD-10-CM

## 2024-10-22 NOTE — TELEPHONE ENCOUNTER
DIAGNOSIS: LT Knee pain   APPOINTMENT DATE: 10/24/2024   NOTES STATUS DETAILS   OFFICE NOTE from referring provider Self    MEDICATION LIST Internal    MRI Internal 7/23/24: LT Knee   DEXA (osteoporosis/bone health) Internal 6/14/22: NM Bone Scan   XRAYS (IMAGES & REPORTS) Scheduled (Internal) 10/24/24: LT Knee

## 2024-10-24 ENCOUNTER — PRE VISIT (OUTPATIENT)
Dept: ORTHOPEDICS | Facility: CLINIC | Age: 59
End: 2024-10-24

## 2024-10-24 ENCOUNTER — ANCILLARY PROCEDURE (OUTPATIENT)
Dept: GENERAL RADIOLOGY | Facility: CLINIC | Age: 59
End: 2024-10-24
Attending: FAMILY MEDICINE
Payer: COMMERCIAL

## 2024-10-24 ENCOUNTER — OFFICE VISIT (OUTPATIENT)
Dept: ORTHOPEDICS | Facility: CLINIC | Age: 59
End: 2024-10-24
Payer: COMMERCIAL

## 2024-10-24 DIAGNOSIS — M25.562 ACUTE PAIN OF LEFT KNEE: Primary | ICD-10-CM

## 2024-10-24 PROCEDURE — 99203 OFFICE O/P NEW LOW 30 MIN: CPT | Mod: 25 | Performed by: FAMILY MEDICINE

## 2024-10-24 PROCEDURE — 20610 DRAIN/INJ JOINT/BURSA W/O US: CPT | Mod: LT | Performed by: FAMILY MEDICINE

## 2024-10-24 PROCEDURE — 73562 X-RAY EXAM OF KNEE 3: CPT | Mod: LT | Performed by: RADIOLOGY

## 2024-10-24 RX ADMIN — TRIAMCINOLONE ACETONIDE 40 MG: 40 INJECTION, SUSPENSION INTRA-ARTICULAR; INTRAMUSCULAR at 09:33

## 2024-10-24 RX ADMIN — LIDOCAINE HYDROCHLORIDE 5 ML: 10 INJECTION, SOLUTION EPIDURAL; INFILTRATION; INTRACAUDAL; PERINEURAL at 09:33

## 2024-10-24 NOTE — PROGRESS NOTES
Large Joint Injection: L knee joint    Date/Time: 10/24/2024 9:33 AM    Performed by: Oz Coto MD  Authorized by: Oz Coto MD    Indications:  Pain and osteoarthritis  Needle Size:  18 G  Approach:  Anterolateral  Location:  Knee      Medications:  40 mg triamcinolone 40 MG/ML; 5 mL lidocaine (PF) 1 %  Aspirate amount (mL):  31  Aspirate:  Clear and yellow  Outcome:  Tolerated well, no immediate complications  Procedure discussed: discussed risks, benefits, and alternatives    Consent Given by:  Patient  Timeout: timeout called immediately prior to procedure    Prep: patient was prepped and draped in usual sterile fashion     There were no complications. The patient tolerated the procedure well. There was minimal bleeding.   The patient was instructed to ice the knee upon leaving clinic and refrain from overuse over the next 2 days.   The patient was instructed to go to the emergency room with any unusual pain, swelling, or redness occurred in the injected area.     Oz Coto MD

## 2024-10-24 NOTE — NURSING NOTE
[address] [address]  Dept: [phone]  ______________________________________________________________________________    Patient: Walter Santa   : 1965   MRN: [id number]   2024    INVASIVE PROCEDURE SAFETY CHECKLIST    Date: 10/24/24   Procedure: Left knee IA aspiration/CSI  Patient Name: Walter Santa  MRN: [id number]  YOB: 1965    Action: Complete sections as appropriate. Any discrepancy results in a HARD COPY until resolved.     PRE PROCEDURE:  Patient ID verified with 2 identifiers (name and  or MRN): Yes  Procedure and site verified with patient/designee (when able): Yes  Accurate consent documentation in medical record: Yes  H&P (or appropriate assessment) documented in medical record: Yes  H&P must be up to 20 days prior to procedure and updates within 24 hours of procedure as applicable: NA  Relevant diagnostic and radiology test results appropriately labeled and displayed as applicable: Yes  Procedure site(s) marked with provider initials: NA    TIMEOUT:  Time-Out performed immediately prior to starting procedure, including verbal and active participation of all team members addressing the following:Yes  * Correct patient identify  * Confirmed that the correct side and site are marked  * An accurate procedure consent form  * Agreement on the procedure to be done  * Correct patient position  * Relevant images and results are properly labeled and appropriately displayed  * The need to administer antibiotics or fluids for irrigation purposes during the procedure as applicable   * Safety precautions based on patient history or medication use    DURING PROCEDURE: Verification of correct person, site, and procedures any time the responsibility for care of the patient is transferred to another member of the care team.       Prior to injection, verified patient identity using patient's name and date  of birth.  Due to injection administration, patient instructed to remain in clinic for 15 minutes  afterwards, and to report any adverse reaction to me immediately.    Joint injection was performed.      Lidocaine Amount Wasted:  Yes: 1 mg/ml   Vial/Syringe: Multi dose vial  Expiration Date:  4/1/28      Danilo Bee, [facility]  October 24, 2024

## 2024-10-24 NOTE — LETTER
10/24/2024      RE: Walter Santa  [address] [facility] [address]     Dear Colleague,    Thank you for referring your patient, Walter Santa, to the [facility] SP[facility]. Please see a copy of my visit note below.      [facility]  SPORTS & ORTHOPEDIC CLINIC VISIT     Oct 24, 2024        ASSESSMENT & PLAN    59-year-old with acute pain and effusion of the left knee that is likely due to exacerbation of osteoarthritis    Reviewed imaging and assessment with patient in detail  Discussed options for treatment including:  -use of compression knee sleeve with patellar cut out  -use of acetaminophen or topical diclofenac PRN  -discussed the benefit of physical therapy and regular exercise  -discussed indication for steroid injection and aspiration.  After discussion the patient opted to pursue this treatment today.  -otherwise follow up prn      Oz Coto MD  [facility]    -----  Chief Complaint   Patient presents with     Left Knee - Pain       SUBJECTIVE  Walter Santa is a/an 59 year old male who is seen as a self referral for evaluation of  left knee pain.     The patient is seen by themselves.  The patient is Right handed    Onset: 6 month(s) ago. Reports insidious onset without acute precipitating event.  Location of Pain: left anterior knee  Worsened by: Standing up after sitting for extended periods  Better with: Ibuprofen, disuse  Treatments tried: ibuprofen  Associated symptoms: Lack of extension, swelling    Orthopedic/Surgical history: YES - Date: Left sided meniscus repair 8-10 years ago with [facility]  Social History/Occupation: Security       REVIEW OF SYSTEMS:  Do you have fever, chills, weight loss? No  Do you have any vision problems? No  Do you have any chest pain or edema? No  Do you have any shortness of breath or wheezing?  No  Do you have stomach problems? No  Do you have any  numbness or focal weakness? No  Do you have diabetes? Yes, Type 2  Do you have problems with bleeding or clotting? No  Do you have an rashes or other skin lesions? No    OBJECTIVE:  There were no vitals taken for this visit.     Patient is alert, No acute distress, pleasant and conversational.    Gait: nonantalgic. Normal heel toe gait.      left knee:   Skin intact. No erythema or ecchymosis.  Moderate effusion    AROM: Zero to approximately 135  without restriction or reported pain.    Palpation: No medial or lateral facet joint tenderness.  Ttp medial joint line    Special Tests:    No ligamentous laxity or pain with valgus or varus stress.  Negative Lachman's, Anterior Drawer and Posterior Drawer     Full Isometric quad strength, extensor mechanism in place     Neurovascularly intact in the lower extremity    Hip and Ankle with full AROM and nontender      RADIOLOGY:    3 view xrays of left knee performed and reviewed independently demonstrating tricompartmental djd with osteophytosis but minimal loss of joint space. No acute findings. See EMR for formal radiology report.              Large Joint Injection: L knee joint    Date/Time: 10/24/2024 9:33 AM    Performed by: zO Coto MD  Authorized by: Oz Coto MD    Indications:  Pain and osteoarthritis  Needle Size:  18 G  Approach:  Anterolateral  Location:  Knee      Medications:  40 mg triamcinolone 40 MG/ML; 5 mL lidocaine (PF) 1 %  Aspirate amount (mL):  31  Aspirate:  Clear and yellow  Outcome:  Tolerated well, no immediate complications  Procedure discussed: discussed risks, benefits, and alternatives    Consent Given by:  Patient  Timeout: timeout called immediately prior to procedure    Prep: patient was prepped and draped in usual sterile fashion     There were no complications. The patient tolerated the procedure well. There was minimal bleeding.   The patient was instructed to ice the knee upon leaving clinic and refrain  from overuse over the next 2 days.   The patient was instructed to go to the emergency room with any unusual pain, swelling, or redness occurred in the injected area.     Oz Coto MD              Again, thank you for allowing me to participate in the care of your patient.      Sincerely,    Oz Coto MD

## 2024-10-24 NOTE — PROGRESS NOTES
[facility]  SPORTS & ORTHOPEDIC CLINIC VISIT     Oct 24, 2024        ASSESSMENT & PLAN    59-year-old with acute pain and effusion of the left knee that is likely due to exacerbation of osteoarthritis    Reviewed imaging and assessment with patient in detail  Discussed options for treatment including:  -use of compression knee sleeve with patellar cut out  -use of acetaminophen or topical diclofenac PRN  -discussed the benefit of physical therapy and regular exercise  -discussed indication for steroid injection and aspiration.  After discussion the patient opted to pursue this treatment today.  -otherwise follow up prn      Oz Coto MD  [facility]IC[facility] [place]    -----  Chief Complaint   Patient presents with    Left Knee - Pain       SUBJECTIVE  Walter Santa is a/an 59 year old male who is seen as a self referral for evaluation of  left knee pain.     The patient is seen by themselves.  The patient is Right handed    Onset: 6 month(s) ago. Reports insidious onset without acute precipitating event.  Location of Pain: left anterior knee  Worsened by: Standing up after sitting for extended periods  Better with: Ibuprofen, disuse  Treatments tried: ibuprofen  Associated symptoms: Lack of extension, swelling    Orthopedic/Surgical history: YES - Date: Left sided meniscus repair 8-10 years ago with [facility]  Social History/Occupation: Security       REVIEW OF SYSTEMS:  Do you have fever, chills, weight loss? No  Do you have any vision problems? No  Do you have any chest pain or edema? No  Do you have any shortness of breath or wheezing?  No  Do you have stomach problems? No  Do you have any numbness or focal weakness? No  Do you have diabetes? Yes, Type 2  Do you have problems with bleeding or clotting? No  Do you have an rashes or other skin lesions? No    OBJECTIVE:  There were no vitals taken for this visit.     Patient is alert, No acute distress,  pleasant and conversational.    Gait: nonantalgic. Normal heel toe gait.      left knee:   Skin intact. No erythema or ecchymosis.  Moderate effusion    AROM: Zero to approximately 135  without restriction or reported pain.    Palpation: No medial or lateral facet joint tenderness.  Ttp medial joint line    Special Tests:    No ligamentous laxity or pain with valgus or varus stress.  Negative Lachman's, Anterior Drawer and Posterior Drawer     Full Isometric quad strength, extensor mechanism in place     Neurovascularly intact in the lower extremity    Hip and Ankle with full AROM and nontender      RADIOLOGY:    3 view xrays of left knee performed and reviewed independently demonstrating tricompartmental djd with osteophytosis but minimal loss of joint space. No acute findings. See EMR for formal radiology report.

## 2024-10-25 DIAGNOSIS — I10 ESSENTIAL HYPERTENSION WITH GOAL BLOOD PRESSURE LESS THAN 140/90: ICD-10-CM

## 2024-10-25 DIAGNOSIS — E11.69 TYPE 2 DIABETES MELLITUS WITH OBESITY (H): ICD-10-CM

## 2024-10-25 DIAGNOSIS — E66.9 TYPE 2 DIABETES MELLITUS WITH OBESITY (H): ICD-10-CM

## 2024-10-25 RX ORDER — LISINOPRIL 5 MG/1
5 TABLET ORAL DAILY
Qty: 90 TABLET | Refills: 2 | Status: SHIPPED | OUTPATIENT
Start: 2024-10-25

## 2024-10-28 RX ORDER — TRIAMCINOLONE ACETONIDE 40 MG/ML
40 INJECTION, SUSPENSION INTRA-ARTICULAR; INTRAMUSCULAR
Status: COMPLETED | OUTPATIENT
Start: 2024-10-24 | End: 2024-10-24

## 2024-10-28 RX ORDER — LIDOCAINE HYDROCHLORIDE 10 MG/ML
5 INJECTION, SOLUTION EPIDURAL; INFILTRATION; INTRACAUDAL; PERINEURAL
Status: COMPLETED | OUTPATIENT
Start: 2024-10-24 | End: 2024-10-24

## 2024-12-11 ENCOUNTER — TRANSFERRED RECORDS (OUTPATIENT)
Dept: HEALTH INFORMATION MANAGEMENT | Facility: CLINIC | Age: 59
End: 2024-12-11
Payer: COMMERCIAL

## 2024-12-28 ENCOUNTER — HEALTH MAINTENANCE LETTER (OUTPATIENT)
Age: 59
End: 2024-12-28

## 2025-01-27 ENCOUNTER — TRANSFERRED RECORDS (OUTPATIENT)
Dept: HEALTH INFORMATION MANAGEMENT | Facility: CLINIC | Age: 60
End: 2025-01-27
Payer: COMMERCIAL

## 2025-02-06 ENCOUNTER — OFFICE VISIT (OUTPATIENT)
Dept: INTERNAL MEDICINE | Facility: CLINIC | Age: 60
End: 2025-02-06
Payer: COMMERCIAL

## 2025-02-06 VITALS
BODY MASS INDEX: 37.19 KG/M2 | SYSTOLIC BLOOD PRESSURE: 136 MMHG | TEMPERATURE: 97.7 F | HEART RATE: 78 BPM | HEIGHT: 77 IN | RESPIRATION RATE: 16 BRPM | WEIGHT: 315 LBS | OXYGEN SATURATION: 98 % | DIASTOLIC BLOOD PRESSURE: 82 MMHG

## 2025-02-06 DIAGNOSIS — E11.69 TYPE 2 DIABETES MELLITUS WITH OBESITY (H): ICD-10-CM

## 2025-02-06 DIAGNOSIS — I10 ESSENTIAL HYPERTENSION WITH GOAL BLOOD PRESSURE LESS THAN 140/90: ICD-10-CM

## 2025-02-06 DIAGNOSIS — E66.9 TYPE 2 DIABETES MELLITUS WITH OBESITY (H): ICD-10-CM

## 2025-02-06 DIAGNOSIS — Z01.818 PRE-OP EXAM: Primary | ICD-10-CM

## 2025-02-06 DIAGNOSIS — M23.232 DERANG OF MEDIAL MENISCUS DUE TO OLD TEAR/INJ, LEFT KNEE: ICD-10-CM

## 2025-02-06 LAB
ANION GAP SERPL CALCULATED.3IONS-SCNC: 11 MMOL/L (ref 7–15)
ATRIAL RATE - MUSE: 72 BPM
BUN SERPL-MCNC: 15.8 MG/DL (ref 8–23)
CALCIUM SERPL-MCNC: 9.8 MG/DL (ref 8.8–10.4)
CHLORIDE SERPL-SCNC: 103 MMOL/L (ref 98–107)
CREAT SERPL-MCNC: 0.89 MG/DL (ref 0.67–1.17)
DIASTOLIC BLOOD PRESSURE - MUSE: NORMAL MMHG
EGFRCR SERPLBLD CKD-EPI 2021: >90 ML/MIN/1.73M2
ERYTHROCYTE [DISTWIDTH] IN BLOOD BY AUTOMATED COUNT: 12.3 % (ref 10–15)
EST. AVERAGE GLUCOSE BLD GHB EST-MCNC: 163 MG/DL
GLUCOSE SERPL-MCNC: 159 MG/DL (ref 70–99)
HBA1C MFR BLD: 7.3 % (ref 0–5.6)
HCO3 SERPL-SCNC: 25 MMOL/L (ref 22–29)
HCT VFR BLD AUTO: 39.2 % (ref 40–53)
HGB BLD-MCNC: 13.9 G/DL (ref 13.3–17.7)
INTERPRETATION ECG - MUSE: NORMAL
MCH RBC QN AUTO: 31 PG (ref 26.5–33)
MCHC RBC AUTO-ENTMCNC: 35.5 G/DL (ref 31.5–36.5)
MCV RBC AUTO: 88 FL (ref 78–100)
P AXIS - MUSE: 34 DEGREES
PLATELET # BLD AUTO: 162 10E3/UL (ref 150–450)
POTASSIUM SERPL-SCNC: 4.7 MMOL/L (ref 3.4–5.3)
PR INTERVAL - MUSE: 166 MS
QRS DURATION - MUSE: 114 MS
QT - MUSE: 428 MS
QTC - MUSE: 468 MS
R AXIS - MUSE: -10 DEGREES
RBC # BLD AUTO: 4.48 10E6/UL (ref 4.4–5.9)
SODIUM SERPL-SCNC: 139 MMOL/L (ref 135–145)
SYSTOLIC BLOOD PRESSURE - MUSE: NORMAL MMHG
T AXIS - MUSE: 1 DEGREES
VENTRICULAR RATE- MUSE: 72 BPM
WBC # BLD AUTO: 4.1 10E3/UL (ref 4–11)

## 2025-02-06 NOTE — PATIENT INSTRUCTIONS
Satisfactory preoperative medical exam in anticipation of left knee meniscus repair scheduled for February 13, 2025, to be done at [facility] orthopedics in [place], for indication of chronic left meniscus tear, no longer responding to conservative measures.    Walter large effusion in the left knee, and had a large-volume arthrocentesis in October 2024, plus a steroid injection, but that only gave him short-term relief.    Walter has been less physically active because of that knee, and has put on some weight, now at all-time maximum 325 pounds, BMI 38.5  He knows he needs to work on weight control, And is willing to consider going on Ozempic, after he is recovered from his knee surgery.  He will send me a [employer] message when he is ready.    Walter's physical exam is satisfactory.  He does have some bilateral leg edema, more on the left side, and that probably relates to his left knee effusions obstructing some lymphatic fluid return.    Will have Walter stop by the laboratory this morning so we can check his basic metabolic panel, blood cell counts, and hemoglobin A1c.  His hemoglobin A1c needs to be less than 8 in order to move forward with the surgery.  I am optimistic that it will  be okay.    Electrocardiogram shows no change compared to 2022.    Sinus rhythm   Incomplete right bundle branch block   Inferior infarct (cited on or before 05-Jan-2022)   Abnormal ECG   When compared with ECG of 02-Aug-2022 09:42,   QRS has decreased   Confirmed by MEKHI REYES MD LOC:[facility] ([id number]) on 2/6/2025 8:29:58 AM     With regards to Walter's medications, I told him on the day before surgery he can take his medications per usual routine, including his suppertime dose of metformin, evening dose of atorvastatin.     I told him that on the morning of his surgery, he can take his morning dose of lisinopril per usual routine  But  DO NOT TAKE METFORMIN, since he will be fasting     Possible Sleep Apnea: snores, not bothered by  daytime sleepness     RECOMMENDATION:  APPROVAL GIVEN to proceed with proposed procedure, without further diagnostic evaluation.

## 2025-02-06 NOTE — PROGRESS NOTES
Preoperative Evaluation  [facility]  [address] [address]  Phone: [phone]  Fax: [phone]  Primary Provider: TRISTON CALVIN MD  Pre-op Performing Provider: TRISTON CLAVIN MD  Feb 6, 2025 2/5/2025   Surgical Information   What procedure is being done? Left knee meniscus repair   Facility or Hospital where procedure/surgery will be performed: [facility]   Who is doing the procedure / surgery? DR. Garcia   Date of surgery / procedure: 02/13/2025   Time of surgery / procedure: Afternoon   Where do you plan to recover after surgery? at home with family     Fax number for surgical facility: [phone]    Assessment & Plan     The proposed surgical procedure is considered INTERMEDIATE risk.    Satisfactory preoperative medical exam in anticipation of left knee meniscus repair scheduled for February 13, 2025, to be done at [facility] orth[facility] in [place], for indication of chronic left meniscus tear, no longer responding to conservative measures.    Walter large effusion in the left knee, and had a large-volume arthrocentesis in October 2024, plus a steroid injection, but that only gave him short-term relief.    Walter has been less physically active because of that knee, and has put on some weight, now at all-time maximum 325 pounds, BMI 38.5  He knows he needs to work on weight control, And is willing to consider going on Ozempic, after he is recovered from his knee surgery.  He will send me a [employer] message when he is ready.    Walter's physical exam is satisfactory.  He does have some bilateral leg edema, more on the left side, and that probably relates to his left knee effusions obstructing some lymphatic fluid return.    Will have Walter stop by the laboratory this morning so we can check his basic metabolic panel, blood cell counts, and hemoglobin A1c.  His hemoglobin A1c needs to be less than 8 in order to move forward with the  surgery.  I am optimistic that it will  be okay.  LATER: Hemoglobin A1c 7.3 still okay to proceed, but I told Walter via [employer] message that he needs to focus on healthy eating, and consider starting Ozempic after his knee surgery is done.  Blood cell counts show normal hemoglobin, borderline low hematocrit, but normal white cells and platelets.  Basic metabolic panel normal except for mild glucose elevation    Electrocardiogram shows no change compared to 2022.    Sinus rhythm   Incomplete right bundle branch block   Inferior infarct (cited on or before 05-Jan-2022)   Abnormal ECG   When compared with ECG of 02-Aug-2022 09:42,   QRS has decreased   Confirmed by MEKHI REYES MD LOC: ([id number]) on 2/6/2025 8:29:58 AM     With regards to Walter's medications, I told him on the day before surgery he can take his medications per usual routine, including his suppertime dose of metformin, evening dose of atorvastatin.     I told him that on the morning of his surgery, he can take his morning dose of lisinopril per usual routine  But  DO NOT TAKE METFORMIN, since he will be fasting     Possible Sleep Apnea: snores, not bothered by daytime sleepness     RECOMMENDATION:  APPROVAL GIVEN to proceed with proposed procedure, without further diagnostic evaluation.     Subjective   Walter is a 59 year old, presenting for the following:  Pre-Op Exam (Left knee meniscus repair with Dr. Garcia at [facility] on 2/13/25 Fax )        2/6/2025     8:16 AM   Additional Questions   Roomed by Monica STERN related to upcoming procedure:     Chronic left meniscus tear, no longer responding to conservative measures.    Walter large effusion in the left knee, and had a large-volume arthrocentesis in October 2024, plus a steroid injection, but that only gave him short-term relief.    History Arthroscopic right knee surgery to repair torn meniscus 2/10/2022 done at [facility]    Prostate cancer, status post radical prostatectomy and  lymph node dissection August 16, 2022  Completed 38 fractions of external beam radiation at [facility] (with additional field involvement of pelvic lymph nodes) as of May 30, 2024    Walter completed hormonal therapy with Eligard March 2024  Still gets hot flashes  Post procedure incontinence better, but Walter still has a lot of frequency  follows-up with Dr. Gallego, might possibly consider going on tadalafil     PSA are monitored by Dr Gallego's team-- undetectable  12/11/2024 PSA, serum or plasma PSA <0.04 ng/mL   0-4.0 NG/mL       8-  NERVE SPARING ROBOTIC ASSISTED PROSTATECTOMY, LEFT PELVIC LYMPH NODE DISSECTION with Catalino Gallego MD  Walter's diagnosis started with an elevated PSA of about 15 from May 5, 2022.       Type 2 diabetes, has been on long-term metformin  I think Walter could be a candidate for Ozempic, which would be added on top of metformin.  I reminded Walter that the main physiologic action of Ozempic is to feel murphy and have less craving for food, and ultimately to eat less  I think he could make a lot of progress with dietary improvements, strength training building lean muscle mass.  He will let me know if he is interested in starting Ozempic which consists of weekly subcutaneous injections that he self administers     He told me he is due for an eye exam, he goes to [facility]  metFORMIN (GLUCOPHAGE) 1000 MG tablet        TAKE 1 TABLET BY MOUTH TWICE DAILY WITH MEALS     6-4-2024  Hemoglobin A1C  0.0 - 5.6 % 6.9 High      eye exam coming up July 2024     Might possibly have some early mild diabetic neuropathy symptoms, with some tingling and numbness in his toes      Obesity with body mass index of 38.5-- Walter is willing to consider Ozempic after he is recovered form knee surgery  NEEDS TO DO STRENGTH TRAINING to maintain (hopefully build) muscle mass, since he is on androgen suppression     Wt Readings from Last 5 Encounters:   02/06/25 (!) 147.4 kg (325 lb)    06/04/24 140.6 kg (310 lb)   11/16/23 137.4 kg (303 lb)   05/09/23 138.1 kg (304 lb 8 oz)   03/31/23 140.6 kg (310 lb)     Kidney stone, passed 1-7-2022 1-5-2022  CT Abdomen Pelvis w Contrast  IMPRESSION: 5 mm calculus at the left ureterovesical junction causes mild left hydronephrosis and hydroureter with a delayed nephrogram and perinephric infiltration.     Fully vaccinated and boosted for COVID-19, received his third shot of Pfizer October 2021. [facility] told him to bring his vaccination card with him, and apparently they're not requiring him to be tested.     Hypertension   5-9-2023 Reduced his dose of lisinopril from 10 down to 5 mg a day,  BP Readings from Last 6 Encounters:   02/06/25 136/82   06/04/24 116/68   11/16/23 133/82   05/09/23 106/66   11/08/22 120/72   08/17/22 125/83     Abnormal ECG, inferior infarct pattern seen since 2017, right bundle branch block present August 2, 2022  walks 10K steps a day  Non-smoker  No history cardiac events     2-6-2025  Sinus rhythm   Incomplete right bundle branch block   Inferior infarct (cited on or before 05-Jan-2022)   Abnormal ECG   When compared with ECG of 02-Aug-2022 09:42,   QRS has decreased   Confirmed by ERIC SAAB, MEKHI LOC:JN ([id number]) on 2/6/2025 8:29:58 AM      Hyperlipidemia  atorvastatin (LIPITOR) 20 MG tablet    Take 1 tablet (20 mg total) by mouth daily.     6-4-2024  LDL Cholesterol Calculated  <=100 mg/dL 39     Direct Measure HDL  >=40 mg/dL 46     Triglycerides  <150 mg/dL 100     Kidney stone, passed 1-7-2022 1-5-2022  CT Abdomen Pelvis w Contrast  IMPRESSION: 5 mm calculus at the left ureterovesical junction causes mild left hydronephrosis and hydroureter with a delayed nephrogram and perinephric infiltration.     Sleep apnea, mild, not using CPAP  Walter was a study subject for the Webster study, comparing the performance of a smart watch to a medical grade home sleep test.  It indicated that Walter has maybe 30-40 apnea  events per night, but this is deemed mild, and he is going to try managing without CPAP for the time being, but always the option of trying CPAP      Advanced by size colon polyp 10 mm removed December 1, 2023, Walter will get a 3-year recheck which would correspond to December 2026      Family history of cerebral aneurysm (mother) and abdominal aortic aneurysm (grandfather)   I told Walter he does not need to worry about these for himself, because he is aorta was included in a CT abdomen January 5, 2022, and he had a magnetic resonance angiogram of the brain with [facility] December 2013     History of shingles in an unusual location (palm of his right hand), occurred while he was in [place] February 2019 2/5/2025   Pre-Op Questionnaire   Have you ever had a heart attack or stroke? No   Have you ever had surgery on your heart or blood vessels, such as a stent placement, a coronary artery bypass, or surgery on an artery in your head, neck, heart, or legs? No   Do you have chest pain with activity? No   Do you have a history of heart failure? No   Do you currently have a cold, bronchitis or symptoms of other infection? No   Do you have a cough, shortness of breath, or wheezing? No   Do you or anyone in your family have previous history of blood clots? No   Do you or does anyone in your family have a serious bleeding problem such as prolonged bleeding following surgeries or cuts? No   Have you ever had problems with anemia or been told to take iron pills? No   Have you had any abnormal blood loss such as black, tarry or bloody stools? No   Have you ever had a blood transfusion? No   Are you willing to have a blood transfusion if it is medically needed before, during, or after your surgery? Yes   Have you or any of your relatives ever had problems with anesthesia? No   Do you have sleep apnea, excessive snoring or daytime drowsiness? No   Do you have any artifical heart valves or other implanted medical  devices like a pacemaker, defibrillator, or continuous glucose monitor? No   Do you have artificial joints? No   Are you allergic to latex? No     Patient Active Problem List    Diagnosis Date Noted    S/P prostatectomy 11/08/2022     Priority: Medium    Prostate cancer (H) 08/16/2022     Priority: Medium    Abnormal electrocardiogram 02/03/2022     Priority: Medium    Type 2 diabetes mellitus with obesity (H)      Priority: Medium     Created by [employer] Annotation: Mar 10 2009  2:03PM - Génesis Spears: 2007--had   fasting glucose of 149 mg/dL [facility].  a1c was 7.4        Obesity (BMI 35.0-39.9) with comorbidity (H) 12/04/2018     Priority: Medium    Essential hypertension with goal blood pressure less than 140/90 12/04/2018     Priority: Medium    Attention Deficit Disorder Without Hyperactivity      Priority: Medium     Created by [employer]  Replacement Utility updated for latest IMO load        Hyperlipidemia LDL goal <100      Priority: Medium     Created by [employer] [facility] Annotation: Apr  3 2009  2:50PM - Génesis Spears: had LDL of   120   with DM2 in 2007          No past medical history on file.  Past Surgical History:   Procedure Laterality Date    ARTHROSCOPY KNEE Right 09/2011    DAVINCI PROSTATECTOMY, LYMPHADENECTOMY N/A 8/16/2022    Procedure: NERVE SPARING ROBOTIC ASSISTED PROSTATECTOMY, LEFT PELVIC LYMPH NODE DISSECTION;  Surgeon: Catalino Gallego MD;  Location: [facility] OR    PALMAR FASCIECTOMY Left 03/09/2016    Release left middle and small fingers, Dr. Mino Soto    SHOULDER SURGERY Right     3 surgeries of R shoulder, bursa problem. has residual weakness in elevation of shoulder.     TONSILLECTOMY       Current Outpatient Medications   Medication Sig Dispense Refill    atorvastatin (LIPITOR) 20 MG tablet Take 1 tablet (20 mg) by mouth daily 90 tablet 3    lisinopril (ZESTRIL) 5 MG tablet TAKE 1 TABLET (5 MG) BY MOUTH DAILY. 90 tablet 2    metFORMIN (GLUCOPHAGE) 1000 MG  "tablet TAKE 1 TABLET (1,000 MG) BY MOUTH 2 TIMES DAILY (WITH MEALS). 180 tablet 2    leuprolide, 3 Month, (ELIGARD) 22.5 MG Inject 22.5 mg by subcutaneous route.       No Known Allergies     Social History     Tobacco Use    Smoking status: Never     Passive exposure: Past    Smokeless tobacco: Never   Substance Use Topics    Alcohol use: Not on file       History   Drug Use Not on file     Review of Systems  Constitutional, HEENT, cardiovascular, pulmonary, GI, , musculoskeletal, neuro, skin, endocrine and psych systems are negative, except as otherwise noted.    Objective    /82 (BP Location: Right arm, Patient Position: Sitting, Cuff Size: Adult Large)   Pulse 78   Temp 97.7  F (36.5  C)   Resp 16   Ht 1.956 m (6' 5\")   Wt (!) 147.4 kg (325 lb)   SpO2 98%   BMI 38.54 kg/m     Estimated body mass index is 38.54 kg/m  as calculated from the following:    Height as of this encounter: 1.956 m (6' 5\").    Weight as of this encounter: 147.4 kg (325 lb).  Physical Exam    General: Alert, in no distress  Skin: No significant lesion seen.  Eyes/nose/throat: Eyes without scleral icterus, eye movements normal, pupils equal and reactive, oropharynx clear  MSK: Neck with good ROM  Pulm: Lungs clear to auscultation bilaterally  Cardiac: Heart with regular rate and rhythm, no murmur or gallop  GI: Abdomen obese, soft, nontender  MSK: Extremities no tenderness  1+ ankle edema, a bit more on left side  + limp because of left knee  Neuro: Moves all extremities, without focal weakness  Psych: Alert, normal mental status. Normal affect and speech      Recent Labs   Lab Test 06/04/24  0751   HGB 12.8*   *      POTASSIUM 4.5   CR 0.88   A1C 6.9*        Diagnostics  Recent Results (from the past week)   EKG 12-lead, tracing only    Collection Time: 02/06/25  8:27 AM   Result Value Ref Range    Systolic Blood Pressure  mmHg    Diastolic Blood Pressure  mmHg    Ventricular Rate 72 BPM    Atrial Rate 72 BPM    " MO Interval 166 ms    QRS Duration 114 ms     ms    QTc 468 ms    P Axis 34 degrees    R AXIS -10 degrees    T Axis 1 degrees    Interpretation ECG       Sinus rhythm  Incomplete right bundle branch block  Inferior infarct (cited on or before 05-Jan-2022)  Abnormal ECG  When compared with ECG of 02-Aug-2022 09:42,  QRS has decreased  Confirmed by MEKHI REYES MD LOC:JN ([id number]) on 2/6/2025 8:29:58 AM     Hemoglobin A1c    Collection Time: 02/06/25  9:10 AM   Result Value Ref Range    Estimated Average Glucose 163 (H) <117 mg/dL    Hemoglobin A1C 7.3 (H) 0.0 - 5.6 %   Basic metabolic panel  (Ca, Cl, CO2, Creat, Gluc, K, Na, BUN)    Collection Time: 02/06/25  9:10 AM   Result Value Ref Range    Sodium 139 135 - 145 mmol/L    Potassium 4.7 3.4 - 5.3 mmol/L    Chloride 103 98 - 107 mmol/L    Carbon Dioxide (CO2) 25 22 - 29 mmol/L    Anion Gap 11 7 - 15 mmol/L    Urea Nitrogen 15.8 8.0 - 23.0 mg/dL    Creatinine 0.89 0.67 - 1.17 mg/dL    GFR Estimate >90 >60 mL/min/1.73m2    Calcium 9.8 8.8 - 10.4 mg/dL    Glucose 159 (H) 70 - 99 mg/dL   CBC with platelets    Collection Time: 02/06/25  9:10 AM   Result Value Ref Range    WBC Count 4.1 4.0 - 11.0 10e3/uL    RBC Count 4.48 4.40 - 5.90 10e6/uL    Hemoglobin 13.9 13.3 - 17.7 g/dL    Hematocrit 39.2 (L) 40.0 - 53.0 %    MCV 88 78 - 100 fL    MCH 31.0 26.5 - 33.0 pg    MCHC 35.5 31.5 - 36.5 g/dL    RDW 12.3 10.0 - 15.0 %    Platelet Count 162 150 - 450 10e3/uL          Revised Cardiac Risk Index (RCRI)  The patient has the following serious cardiovascular risks for perioperative complications:   - No serious cardiac risks = 0 points     RCRI Interpretation: 0 points: Class I (very low risk - 0.4% complication rate)    I spent 30 minutes on this encounter, including reviewing interval history since last visit, examining the patient, explaining and counseling the issues enumerated in the Assessment and Plan (patient given a copy), ordering indicated tests    The  longitudinal plan of care for the diagnosis(es)/condition(s) as documented were addressed during this visit. Due to the added complexity in care, I will continue to support Walter in the subsequent management and with ongoing continuity of care.      Signed Electronically by: TRISTON CALVIN MD  A copy of this evaluation report is provided to the requesting physician.

## 2025-03-03 ENCOUNTER — TRANSFERRED RECORDS (OUTPATIENT)
Dept: HEALTH INFORMATION MANAGEMENT | Facility: CLINIC | Age: 60
End: 2025-03-03
Payer: COMMERCIAL

## 2025-05-05 ENCOUNTER — PATIENT OUTREACH (OUTPATIENT)
Dept: CARE COORDINATION | Facility: CLINIC | Age: 60
End: 2025-05-05
Payer: COMMERCIAL

## 2025-05-12 ENCOUNTER — TRANSFERRED RECORDS (OUTPATIENT)
Dept: HEALTH INFORMATION MANAGEMENT | Facility: CLINIC | Age: 60
End: 2025-05-12
Payer: COMMERCIAL

## 2025-05-19 ENCOUNTER — PATIENT OUTREACH (OUTPATIENT)
Dept: CARE COORDINATION | Facility: CLINIC | Age: 60
End: 2025-05-19
Payer: COMMERCIAL

## 2025-05-22 ENCOUNTER — OFFICE VISIT (OUTPATIENT)
Dept: DERMATOLOGY | Facility: CLINIC | Age: 60
End: 2025-05-22
Payer: COMMERCIAL

## 2025-05-22 DIAGNOSIS — D22.9 MULTIPLE BENIGN NEVI: Primary | ICD-10-CM

## 2025-05-22 DIAGNOSIS — Z12.83 SCREENING EXAM FOR SKIN CANCER: ICD-10-CM

## 2025-05-22 DIAGNOSIS — L81.4 LENTIGINES: ICD-10-CM

## 2025-05-22 DIAGNOSIS — D18.01 CHERRY ANGIOMA: ICD-10-CM

## 2025-05-22 DIAGNOSIS — Z92.3 HISTORY OF RADIATION THERAPY: ICD-10-CM

## 2025-05-22 DIAGNOSIS — L82.1 SK (SEBORRHEIC KERATOSIS): ICD-10-CM

## 2025-05-22 NOTE — PROGRESS NOTES
[facility] Dermatology Note  Encounter Date: May 22, 2025  Office Visit      Dermatology Problem List:  FBSE: 5/22/25    1. Radiation dermatitis - resolved  - Tx: OTC Gentle moisturizer.     PMHx: Radiation in 2024 for prostate cancer, DM type 2  Social: Works security for [employer]  ____________________________________________    Assessment & Plan:  # Hx of radiation therapy for prostate cancer - completed in 2024  - Continue FBSE every 12 months  - Discussed increased risk of skin cancers in patients on chronic radiation.  - Counseled regarding photoprotection (SPF30+ broad spectrum sun screen, UPF clothing, broad brimmed hats, avoidance of sunlight during peak hours ~11a-3p)\    # Benign findings: multiple benign nevi, lentigines, cherry angiomas, SKs  - edu on benign etiology  - Signs and Symptoms of non-melanoma skin cancer and ABCDEs of melanoma reviewed with patient. Patient encouraged to perform monthly self skin exams and educated on how to perform them. UV precautions reviewed with patient. Patient was asked about new or changing moles/lesions on body.   - Sunscreen: Apply 20 minutes prior to going outdoors and reapply every two hours, when wet or sweating. We recommend using an SPF 30 or higher, and to use one that is water resistant.     - RTC for changes    Procedures Performed:   None    Follow-up: 1 year(s) in-person, or earlier for new or changing lesions    Staff and scribe:    Scribe Disclosure:   I, JULIO BARAJAS, am serving as a scribe; to document services personally performed by Isabel Burns PA-C -based on data collection and the provider's statements to me.     Provider Disclosure:  I agree with above History, Review of Systems, Physical exam and Plan.  I have reviewed the content of the documentation and have edited it as needed. I have personally performed the services documented here and the documentation accurately represents those services and the decisions  I have made.      Electronically signed by:    All risks, benefits and alternatives were discussed with patient.  Patient is in agreement and understands the assessment and plan.  All questions were answered.    Isabel Burns PA-C, MPAS  [facility] [place]: Phone: [phone], Fax: [phone]  [facility]: Phone: [phone],  Fax: [phone]  [facility]: Phone: [phone], Fax: [phone]  ____________________________________________    CC: Skin Check (Fbsc /Concerns: none)      Reviewed patients past medical history and pertinent chart review prior to patient's visit today.     HPI:  Mr. Walter Santa is a 60 year old male who presents today as a new patient for FBSC.     Today patient did not report any spots of concern.     No personal or family hx of skin cancer.     Patient is otherwise feeling well, without additional concerns.    Labs:  N/A    Physical Exam:  Vitals: There were no vitals taken for this visit.  SKIN: Full skin, which includes the head/face, both arms, chest, back, abdomen,both legs, genitalia and/or groin buttocks, digits and/or nails, was examined.   -  Salazar's skin type II, has <100 nevi  - There are dome shaped bright red papules on the trunk.   - Multiple regular brown pigmented macules and papules are identified on the trunk and extremities.   - Scattered brown macules on sun exposed areas.  - There are waxy stuck on tan to brown papules on the trunk.    - defers groin exam.  - No other lesions of concern on areas examined.     Medications:  Current Outpatient Medications   Medication Sig Dispense Refill    atorvastatin (LIPITOR) 20 MG tablet Take 1 tablet (20 mg) by mouth daily 90 tablet 3    lisinopril (ZESTRIL) 5 MG tablet TAKE 1 TABLET (5 MG) BY MOUTH DAILY. 90 tablet 2    metFORMIN (GLUCOPHAGE) 1000 MG tablet TAKE 1 TABLET (1,000 MG) BY MOUTH 2 TIMES DAILY  (WITH MEALS). 180 tablet 2     No current facility-administered medications for this visit.      Past Medical/Surgical History:   Patient Active Problem List   Diagnosis    Hyperlipidemia LDL goal <100    Type 2 diabetes mellitus with obesity (H)    Attention Deficit Disorder Without Hyperactivity    Obesity (BMI 35.0-39.9) with comorbidity (H)    Essential hypertension with goal blood pressure less than 140/90    Abnormal electrocardiogram    Prostate cancer (H)    S/P prostatectomy     History reviewed. No pertinent past medical history.

## 2025-05-22 NOTE — PATIENT INSTRUCTIONS
Proper skin care from [facility]:    -Eliminate harsh soaps as they strip the natural oils from the skin, often resulting in dry itchy skin ( i.e. Dial, Zest, Citizen of [place])  -Use mild soaps such as Cetaphil or Dove Sensitive Skin in the shower. You do not need to use soap on arms, legs, and trunk every time you shower unless visibly soiled.   -Avoid hot or cold showers.  -After showering, lightly dry off and apply moisturizing within 2-3 minutes. This will help trap moisture in the skin.   -Aggressive use of a moisturizer at least 1-2 times a day to the entire body (including -Vanicream, Cetaphil, Aquaphor or Cerave) and moisturize hands after every washing.  -We recommend using moisturizers that come in a tub that needs to be scooped out, not a pump. This has more of an oil base. It will hold moisture in your skin much better than a water base moisturizer. The above recommended are non-pore clogging.      Wear a sunscreen with at least SPF 30 on your face, ears, neck and V of the chest daily. Wear sunscreen on other areas of the body if those areas are exposed to the sun throughout the day. Sunscreens can contain physical and/or chemical blockers. Physical blockers are less likely to clog pores, these include zinc oxide and titanium dioxide. Reapply every two hour and after swimming.     Sunscreen examples: https://www.ewg.org/sunscreen/    UV radiation  UVA radiation remains constant throughout the day and throughout the year. It is a longer wavelength than UVB and therefore penetrates deeper into the skin leading to immediate and delayed tanning, photoaging, and skin cancer. 70-80% of UVA and UVB radiation occurs between the hours of 10am-2pm.  UVB radiation  UVB radiation causes the most harmful effects and is more significant during the summer months. However, snow and ice can reflect UVB radiation leading to skin damage during the winter months as well. UVB radiation is responsible for tanning,  burning, inflammation, delayed erythema (pinkness), pigmentation (brown spots), and skin cancer.     I recommend self monthly full body exams and yearly full body exams with a dermatology provider. If you develop a new or changing lesion please follow up for examination. Most skin cancers are pink and scaly or pink and pearly. However, we do see blue/brown/black skin cancers.  Consider the ABCDEs of melanoma when giving yourself your monthly full body exam ( don't forget the groin, buttocks, feet, toes, etc). A-asymmetry, B-borders, C-color, D-diameter, E-elevation or evolving. If you see any of these changes please follow up in clinic. If you cannot see your back I recommend purchasing a hand held mirror to use with a larger wall mirror.       Checking for Skin Cancer  You can find cancer early by checking your skin each month. There are 3 kinds of skin cancer. They are melanoma, basal cell carcinoma, and squamous cell carcinoma. Doing monthly skin checks is the best way to find new marks or skin changes. Follow the instructions below for checking your skin.   The ABCDEs of checking moles for melanoma   Check your moles or growths for signs of melanoma using ABCDE:   Asymmetry: the sides of the mole or growth don t match  Border: the edges are ragged, notched, or blurred  Color: the color within the mole or growth varies  Diameter: the mole or growth is larger than 6 mm (size of a pencil eraser)  Evolving: the size, shape, or color of the mole or growth is changing (evolving is not shown in the images below)    Checking for other types of skin cancer  Basal cell carcinoma or squamous cell carcinoma have symptoms such as:     A spot or mole that looks different from all other marks on your skin  Changes in how an area feels, such as itching, tenderness, or pain  Changes in the skin's surface, such as oozing, bleeding, or scaliness  A sore that does not heal  New swelling or redness beyond the border of a  mole    Who s at risk?  Anyone can get skin cancer. But you are at greater risk if you have:   Fair skin, light-colored hair, or light-colored eyes  Many moles or abnormal moles on your skin  A history of sunburns from sunlight or tanning beds  A family history of skin cancer  A history of exposure to radiation or chemicals  A weakened immune system  If you have had skin cancer in the past, you are at risk for recurring skin cancer.   How to check your skin  Do your monthly skin checkups in front of a full-length mirror. Check all parts of your body, including your:   Head (ears, face, neck, and scalp)  Torso (front, back, and sides)  Arms (tops, undersides, upper, and lower armpits)  Hands (palms, backs, and fingers, including under the nails)  Buttocks and genitals  Legs (front, back, and sides)  Feet (tops, soles, toes, including under the nails, and between toes)  If you have a lot of moles, take digital photos of them each month. Make sure to take photos both up close and from a distance. These can help you see if any moles change over time.   Most skin changes are not cancer. But if you see any changes in your skin, call your doctor right away. Only he or she can diagnose a problem. If you have skin cancer, seeing your doctor can be the first step toward getting the treatment that could save your life.   [employer] last reviewed this educational content on 4/1/2019 2000-2020 [employer]. [address] [place] [address], [place] [address]. All rights reserved. This information is not intended as a substitute for professional medical care. Always follow your healthcare professional's instructions.       When should I call my doctor?  If you are worsening or not improving, please, contact us or seek urgent care as noted below.     Who should I call with questions (adults)?    [facility] [phone]  For urgent needs outside of business hours call the [facility] at  [phone] and ask for the dermatology resident on call to be paged  If this is a medical emergency and you are unable to reach an ER, Call [phone]      If you need a prescription refill, please contact your pharmacy. Refills are approved or denied by our Physicians during normal business hours, Monday through Friday.  Per office policy, refills will not be granted if you have not been seen within the past year (or sooner depending on the condition).

## 2025-05-22 NOTE — LETTER
5/22/2025      Walter Santa  [address] [address]      Dear Colleague,    Thank you for referring your patient, Walter Santa, to the [facility]. Please see a copy of my visit note below.    [facility] Note  Encounter Date: May 22, 2025  Office Visit      Dermatology Problem List:  FBSE: 5/22/25    1. Radiation dermatitis - resolved  - Tx: OTC Gentle moisturizer.     PMHx: Radiation in 2024 for prostate cancer, DM type 2  Social: Works security for [employer]  ____________________________________________    Assessment & Plan:  # Hx of radiation therapy for prostate cancer - completed in 2024  - Continue FBSE every 12 months  - Discussed increased risk of skin cancers in patients on chronic radiation.  - Counseled regarding photoprotection (SPF30+ broad spectrum sun screen, UPF clothing, broad brimmed hats, avoidance of sunlight during peak hours ~11a-3p)\    # Benign findings: multiple benign nevi, lentigines, cherry angiomas, SKs  - edu on benign etiology  - Signs and Symptoms of non-melanoma skin cancer and ABCDEs of melanoma reviewed with patient. Patient encouraged to perform monthly self skin exams and educated on how to perform them. UV precautions reviewed with patient. Patient was asked about new or changing moles/lesions on body.   - Sunscreen: Apply 20 minutes prior to going outdoors and reapply every two hours, when wet or sweating. We recommend using an SPF 30 or higher, and to use one that is water resistant.     - RTC for changes    Procedures Performed:   None    Follow-up: 1 year(s) in-person, or earlier for new or changing lesions    Staff and scribe:    Scribe Disclosure:   I, JULIO BARAJAS, am serving as a scribe; to document services personally performed by Isabel Burns PA-C -based on data collection and the provider's statements to me.     Provider Disclosure:  I agree with above History, Review of  Systems, Physical exam and Plan.  I have reviewed the content of the documentation and have edited it as needed. I have personally performed the services documented here and the documentation accurately represents those services and the decisions I have made.      Electronically signed by:    All risks, benefits and alternatives were discussed with patient.  Patient is in agreement and understands the assessment and plan.  All questions were answered.    Isabel Burns PA-C, MPAS  [facility]: Phone: [phone], Fax: [phone]  [facility]: Phone: [phone],  Fax: [phone]  [facility]: Phone: [phone], Fax: [phone]  ____________________________________________    CC: Skin Check (Fbsc /Concerns: none)      Reviewed patients past medical history and pertinent chart review prior to patient's visit today.     HPI:  Mr. Walter Santa is a 60 year old male who presents today as a new patient for FBSC.     Today patient did not report any spots of concern.     No personal or family hx of skin cancer.     Patient is otherwise feeling well, without additional concerns.    Labs:  N/A    Physical Exam:  Vitals: There were no vitals taken for this visit.  SKIN: Full skin, which includes the head/face, both arms, chest, back, abdomen,both legs, genitalia and/or groin buttocks, digits and/or nails, was examined.   -  Salazar's skin type II, has <100 nevi  - There are dome shaped bright red papules on the trunk.   - Multiple regular brown pigmented macules and papules are identified on the trunk and extremities.   - Scattered brown macules on sun exposed areas.  - There are waxy stuck on tan to brown papules on the trunk.    - defers groin exam.  - No other lesions of concern on areas examined.     Medications:  Current Outpatient Medications   Medication Sig Dispense Refill     atorvastatin  (LIPITOR) 20 MG tablet Take 1 tablet (20 mg) by mouth daily 90 tablet 3     lisinopril (ZESTRIL) 5 MG tablet TAKE 1 TABLET (5 MG) BY MOUTH DAILY. 90 tablet 2     metFORMIN (GLUCOPHAGE) 1000 MG tablet TAKE 1 TABLET (1,000 MG) BY MOUTH 2 TIMES DAILY (WITH MEALS). 180 tablet 2     No current facility-administered medications for this visit.      Past Medical/Surgical History:   Patient Active Problem List   Diagnosis     Hyperlipidemia LDL goal <100     Type 2 diabetes mellitus with obesity (H)     Attention Deficit Disorder Without Hyperactivity     Obesity (BMI 35.0-39.9) with comorbidity (H)     Essential hypertension with goal blood pressure less than 140/90     Abnormal electrocardiogram     Prostate cancer (H)     S/P prostatectomy     History reviewed. No pertinent past medical history.                     Again, thank you for allowing me to participate in the care of your patient.        Sincerely,        Isabel Burns PA-C    Electronically signed

## 2025-06-09 ENCOUNTER — TRANSFERRED RECORDS (OUTPATIENT)
Dept: HEALTH INFORMATION MANAGEMENT | Facility: CLINIC | Age: 60
End: 2025-06-09
Payer: COMMERCIAL

## 2025-07-28 SDOH — HEALTH STABILITY: PHYSICAL HEALTH: ON AVERAGE, HOW MANY DAYS PER WEEK DO YOU ENGAGE IN MODERATE TO STRENUOUS EXERCISE (LIKE A BRISK WALK)?: 7 DAYS

## 2025-07-28 SDOH — HEALTH STABILITY: PHYSICAL HEALTH: ON AVERAGE, HOW MANY MINUTES DO YOU ENGAGE IN EXERCISE AT THIS LEVEL?: 40 MIN

## 2025-07-28 ASSESSMENT — SOCIAL DETERMINANTS OF HEALTH (SDOH): HOW OFTEN DO YOU GET TOGETHER WITH FRIENDS OR RELATIVES?: ONCE A WEEK

## 2025-07-29 ENCOUNTER — OFFICE VISIT (OUTPATIENT)
Dept: FAMILY MEDICINE | Facility: CLINIC | Age: 60
End: 2025-07-29
Payer: COMMERCIAL

## 2025-07-29 VITALS
RESPIRATION RATE: 14 BRPM | HEART RATE: 88 BPM | SYSTOLIC BLOOD PRESSURE: 118 MMHG | OXYGEN SATURATION: 96 % | WEIGHT: 310 LBS | TEMPERATURE: 98.5 F | DIASTOLIC BLOOD PRESSURE: 68 MMHG | BODY MASS INDEX: 36.6 KG/M2 | HEIGHT: 77 IN

## 2025-07-29 DIAGNOSIS — I10 ESSENTIAL HYPERTENSION WITH GOAL BLOOD PRESSURE LESS THAN 140/90: ICD-10-CM

## 2025-07-29 DIAGNOSIS — E11.69 TYPE 2 DIABETES MELLITUS WITH OBESITY (H): ICD-10-CM

## 2025-07-29 DIAGNOSIS — Z00.00 ROUTINE GENERAL MEDICAL EXAMINATION AT A HEALTH CARE FACILITY: Primary | ICD-10-CM

## 2025-07-29 DIAGNOSIS — E78.5 HYPERLIPIDEMIA LDL GOAL <100: ICD-10-CM

## 2025-07-29 DIAGNOSIS — Z85.46 HISTORY OF PROSTATE CANCER: ICD-10-CM

## 2025-07-29 DIAGNOSIS — R35.0 FREQUENT URINATION: ICD-10-CM

## 2025-07-29 DIAGNOSIS — E66.9 TYPE 2 DIABETES MELLITUS WITH OBESITY (H): ICD-10-CM

## 2025-07-29 DIAGNOSIS — E66.01 MORBID OBESITY (H): ICD-10-CM

## 2025-07-29 LAB
ALBUMIN SERPL BCG-MCNC: 4.6 G/DL (ref 3.5–5.2)
ALBUMIN UR-MCNC: ABNORMAL MG/DL
ALP SERPL-CCNC: 75 U/L (ref 40–150)
ALT SERPL W P-5'-P-CCNC: 19 U/L (ref 0–70)
ANION GAP SERPL CALCULATED.3IONS-SCNC: 10 MMOL/L (ref 7–15)
APPEARANCE UR: CLEAR
AST SERPL W P-5'-P-CCNC: 19 U/L (ref 0–45)
BACTERIA #/AREA URNS HPF: NORMAL /HPF
BILIRUB SERPL-MCNC: 0.5 MG/DL
BILIRUB UR QL STRIP: NEGATIVE
BUN SERPL-MCNC: 12.4 MG/DL (ref 8–23)
CALCIUM SERPL-MCNC: 9.7 MG/DL (ref 8.8–10.4)
CHLORIDE SERPL-SCNC: 103 MMOL/L (ref 98–107)
CHOLEST SERPL-MCNC: 156 MG/DL
COLOR UR AUTO: YELLOW
CREAT SERPL-MCNC: 0.96 MG/DL (ref 0.67–1.17)
CREAT UR-MCNC: 288 MG/DL
EGFRCR SERPLBLD CKD-EPI 2021: 89 ML/MIN/1.73M2
EST. AVERAGE GLUCOSE BLD GHB EST-MCNC: 146 MG/DL
FASTING STATUS PATIENT QL REPORTED: ABNORMAL
FASTING STATUS PATIENT QL REPORTED: ABNORMAL
GLUCOSE SERPL-MCNC: 157 MG/DL (ref 70–99)
GLUCOSE UR STRIP-MCNC: NEGATIVE MG/DL
HBA1C MFR BLD: 6.7 % (ref 0–5.6)
HCO3 SERPL-SCNC: 27 MMOL/L (ref 22–29)
HDLC SERPL-MCNC: 38 MG/DL
HGB UR QL STRIP: NEGATIVE
KETONES UR STRIP-MCNC: NEGATIVE MG/DL
LDLC SERPL CALC-MCNC: 92 MG/DL
LEUKOCYTE ESTERASE UR QL STRIP: NEGATIVE
MICROALBUMIN UR-MCNC: 14.2 MG/L
MICROALBUMIN/CREAT UR: 4.93 MG/G CR (ref 0–17)
NITRATE UR QL: NEGATIVE
NONHDLC SERPL-MCNC: 118 MG/DL
PH UR STRIP: 5.5 [PH] (ref 5–7)
POTASSIUM SERPL-SCNC: 4.8 MMOL/L (ref 3.4–5.3)
PROT SERPL-MCNC: 7.2 G/DL (ref 6.4–8.3)
RBC #/AREA URNS AUTO: NORMAL /HPF
SODIUM SERPL-SCNC: 140 MMOL/L (ref 135–145)
SP GR UR STRIP: >=1.03 (ref 1–1.03)
SQUAMOUS #/AREA URNS AUTO: NORMAL /LPF
TRIGL SERPL-MCNC: 132 MG/DL
UROBILINOGEN UR STRIP-ACNC: 0.2 E.U./DL
WBC #/AREA URNS AUTO: NORMAL /HPF

## 2025-07-29 PROCEDURE — 3078F DIAST BP <80 MM HG: CPT | Performed by: PHYSICIAN ASSISTANT

## 2025-07-29 PROCEDURE — 1126F AMNT PAIN NOTED NONE PRSNT: CPT | Performed by: PHYSICIAN ASSISTANT

## 2025-07-29 PROCEDURE — 82570 ASSAY OF URINE CREATININE: CPT | Performed by: PHYSICIAN ASSISTANT

## 2025-07-29 PROCEDURE — 3074F SYST BP LT 130 MM HG: CPT | Performed by: PHYSICIAN ASSISTANT

## 2025-07-29 PROCEDURE — 80061 LIPID PANEL: CPT | Performed by: PHYSICIAN ASSISTANT

## 2025-07-29 PROCEDURE — 83036 HEMOGLOBIN GLYCOSYLATED A1C: CPT | Performed by: PHYSICIAN ASSISTANT

## 2025-07-29 PROCEDURE — G2211 COMPLEX E/M VISIT ADD ON: HCPCS | Performed by: PHYSICIAN ASSISTANT

## 2025-07-29 PROCEDURE — 36415 COLL VENOUS BLD VENIPUNCTURE: CPT | Performed by: PHYSICIAN ASSISTANT

## 2025-07-29 PROCEDURE — 3044F HG A1C LEVEL LT 7.0%: CPT | Performed by: PHYSICIAN ASSISTANT

## 2025-07-29 PROCEDURE — 82043 UR ALBUMIN QUANTITATIVE: CPT | Performed by: PHYSICIAN ASSISTANT

## 2025-07-29 PROCEDURE — 81001 URINALYSIS AUTO W/SCOPE: CPT | Performed by: PHYSICIAN ASSISTANT

## 2025-07-29 PROCEDURE — 99396 PREV VISIT EST AGE 40-64: CPT | Performed by: PHYSICIAN ASSISTANT

## 2025-07-29 PROCEDURE — 99214 OFFICE O/P EST MOD 30 MIN: CPT | Mod: 25 | Performed by: PHYSICIAN ASSISTANT

## 2025-07-29 PROCEDURE — 80053 COMPREHEN METABOLIC PANEL: CPT | Performed by: PHYSICIAN ASSISTANT

## 2025-07-29 RX ORDER — ATORVASTATIN CALCIUM 20 MG/1
20 TABLET, FILM COATED ORAL DAILY
Qty: 90 TABLET | Refills: 3 | Status: SHIPPED | OUTPATIENT
Start: 2025-07-29

## 2025-07-29 RX ORDER — LISINOPRIL 5 MG/1
5 TABLET ORAL DAILY
Qty: 90 TABLET | Refills: 2 | Status: SHIPPED | OUTPATIENT
Start: 2025-07-29

## 2025-07-29 ASSESSMENT — PAIN SCALES - GENERAL: PAINLEVEL_OUTOF10: NO PAIN (0)

## 2025-07-29 NOTE — PROGRESS NOTES
Preventive Care Visit  [facility] PA-C, Family Medicine  Jul 29, 2025      Assessment & Plan     Routine general medical examination at a health care facility      Type 2 diabetes mellitus with obesity (H)  Stable, blood sugars have been around 118 fasting.  Taking metformin and tolerating well  Due for eye exam  Taking ACE/statin  Labs today for surveillance  Has been losing weight with diet and exercise  - Albumin Random Urine Quantitative with Creat Ratio; Future  - Lipid panel reflex to direct LDL Non-fasting; Future  - Adult Eye  Referral; Future  - HEMOGLOBIN A1C; Future  - metFORMIN (GLUCOPHAGE) 1000 MG tablet; Take 1 tablet (1,000 mg) by mouth 2 times daily (with meals).  - Comprehensive metabolic panel (BMP + Alb, Alk Phos, ALT, AST, Total. Bili, TP); Future  - Albumin Random Urine Quantitative with Creat Ratio  - Lipid panel reflex to direct LDL Non-fasting  - HEMOGLOBIN A1C  - Comprehensive metabolic panel (BMP + Alb, Alk Phos, ALT, AST, Total. Bili, TP)    Hyperlipidemia LDL goal <100  stable  - atorvastatin (LIPITOR) 20 MG tablet; Take 1 tablet (20 mg) by mouth daily.    Essential hypertension with goal blood pressure less than 140/90  stable  - lisinopril (ZESTRIL) 5 MG tablet; Take 1 tablet (5 mg) by mouth daily.    Morbid obesity (H)  Has previous discussed GLP-1 with provider but has lost 15 lbs in the past few months with LSMs.  He will continue to focus on healthy diet and exercise    History of prostate cancer  Follows q 6 months with urology.     Frequent urination  Likely secondary to prostate radiation.  Will check US today for completeness  - UA with Microscopic reflex to Culture - lab collect; Future  - UA with Microscopic reflex to Culture - lab collect    The longitudinal plan of care for the diagnosis(es)/condition(s) as documented were addressed during this visit. Due to the added complexity in care, I will continue to support  "Walter in the subsequent management and with ongoing continuity of care.    BMI  Estimated body mass index is 36.76 kg/m  as calculated from the following:    Height as of this encounter: 1.956 m (6' 5\").    Weight as of this encounter: 140.6 kg (310 lb).   Weight management plan: Discussed healthy diet and exercise guidelines    Counseling  Appropriate preventive services were addressed with this patient via screening, questionnaire, or discussion as appropriate for fall prevention, nutrition, physical activity, Tobacco-use cessation, social engagement, weight loss and cognition.  Checklist reviewing preventive services available has been given to the patient.  Reviewed patient's diet, addressing concerns and/or questions.   He is at risk for psychosocial distress and has been provided with information to reduce risk.   Reviewed preventive health counseling, as reflected in patient instructions       Regular exercise       Healthy diet/nutrition       Colorectal cancer screening    Follow-up       Ronni Watson is a 60 year old, presenting for the following:  Annual Visit        7/29/2025     7:02 AM   Additional Questions   Roomed by Ondina STERN    Advance Care Planning    Discussed advance care planning with patient; informed AVS has link to [facility].        7/28/2025   General Health   How would you rate your overall physical health? (!) FAIR   Feel stress (tense, anxious, or unable to sleep) To some extent   (!) STRESS CONCERN      7/28/2025   Nutrition   Three or more servings of calcium each day? Yes   Diet: Regular (no restrictions)   How many servings of fruit and vegetables per day? (!) 0-1   How many sweetened beverages each day? 0-1         7/28/2025   Exercise   Days per week of moderate/strenous exercise 7 days   Average minutes spent exercising at this level 40 min         7/28/2025   Social Factors   Frequency of gathering with friends or relatives Once a week   Worry food won't " last until get money to buy more No   Food not last or not have enough money for food? No   Do you have housing? (Housing is defined as stable permanent housing and does not include staying outside in a car, in a tent, in an abandoned building, in an overnight shelter, or couch-surfing.) Yes   Are you worried about losing your housing? No   Lack of transportation? No   Unable to get utilities (heat,electricity)? No         7/28/2025   Fall Risk   Fallen 2 or more times in the past year? No   Trouble with walking or balance? No          7/28/2025   Dental   Dentist two times every year? Yes         Today's PHQ-2 Score:       2/5/2025     7:24 AM   PHQ-2 ( 1999 [employer])   Q1: Little interest or pleasure in doing things 0   Q2: Feeling down, depressed or hopeless 0   PHQ-2 Score 0    Q1: Little interest or pleasure in doing things Not at all   Q2: Feeling down, depressed or hopeless Not at all   PHQ-2 Score 0       Patient-reported         7/28/2025   Substance Use   Alcohol more than 3/day or more than 7/wk No   Do you use any other substances recreationally? No     Social History     Tobacco Use    Smoking status: Never     Passive exposure: Past    Smokeless tobacco: Never   Vaping Use    Vaping status: Never Used   Substance Use Topics    Alcohol use: Yes     Comment: 3-4 times a month    Drug use: Never             7/28/2025   One time HIV Screening   Previous HIV test? No         7/28/2025   STI Screening   New sexual partner(s) since last STI/HIV test? No   Last PSA:   Prostate Specific Antigen Screen   Date Value Ref Range Status   05/05/2022 15.37 (H) 0.00 - 3.50 ug/L Final     ASCVD Risk   The ASCVD Risk score (Suni WATSON, et al., 2019) failed to calculate for the following reasons:    The valid total cholesterol range is 130 to 320 mg/dL          Reviewed and updated as needed this visit by Provider   Tobacco   Meds  Problems  Med Hx  Surg Hx  Fam Hx            Past Medical History:   Diagnosis  "Date    Arthritis     Cancer (H) 05/2022    Stage 2 prostrate cancer    Diabetes (H) 2007    Hypertension 2012     Past Surgical History:   Procedure Laterality Date    ARTHROSCOPY KNEE Right 09/01/2011    BIOPSY  07/2022    prostrate    COLONOSCOPY  12/2024    DAVINCI PROSTATECTOMY, LYMPHADENECTOMY N/A 08/16/2022    Procedure: NERVE SPARING ROBOTIC ASSISTED PROSTATECTOMY, LEFT PELVIC LYMPH NODE DISSECTION;  Surgeon: Catalino Gallego MD;  Location: [facility] OR    PALMAR FASCIECTOMY Left 03/09/2016    Release left middle and small fingers, Dr. Mino Soto    SHOULDER SURGERY Right     3 surgeries of R shoulder, bursa problem. has residual weakness in elevation of shoulder.     TONSILLECTOMY           Review of Systems  Constitutional, HEENT, cardiovascular, pulmonary, GI, , musculoskeletal, neuro, skin, endocrine and psych systems are negative, except as otherwise noted.     Objective    Exam  /68   Pulse 88   Temp 98.5  F (36.9  C)   Resp 14   Ht 1.956 m (6' 5\")   Wt (!) 140.6 kg (310 lb)   SpO2 96%   BMI 36.76 kg/m     Estimated body mass index is 36.76 kg/m  as calculated from the following:    Height as of this encounter: 1.956 m (6' 5\").    Weight as of this encounter: 140.6 kg (310 lb).    Physical Exam  GENERAL: alert and no distress  EYES: Eyes grossly normal to inspection, PERRL and conjunctivae and sclerae normal  HENT: ear canals and TM's normal, nose and mouth without ulcers or lesions  NECK: no adenopathy, no asymmetry, masses, or scars  RESP: lungs clear to auscultation - no rales, rhonchi or wheezes  CV: regular rate and rhythm, normal S1 S2, no S3 or S4, no murmur, click or rub, no peripheral edema   ABDOMEN: soft, nontender, no hepatosplenomegaly, no masses and bowel sounds normal  SKIN: no suspicious lesions or rashes  PSYCH: mentation appears normal, affect normal/bright        Signed Electronically by: Yaw Perera PA-C    "

## 2025-07-30 ENCOUNTER — PATIENT OUTREACH (OUTPATIENT)
Dept: CARE COORDINATION | Facility: CLINIC | Age: 60
End: 2025-07-30
Payer: COMMERCIAL

## (undated) DEVICE — NDL INSUFFLATION 13GA 120MM C2201

## (undated) DEVICE — WIPES FOLEY CARE SURESTEP PROVON DFC100

## (undated) DEVICE — SOL NACL 0.9% INJ 1000ML BAG 2B1324X

## (undated) DEVICE — SU MONOCRYL 3-0 RB-1 27" UND Y215H

## (undated) DEVICE — DAVINCI XI DRAPE ARM 470015

## (undated) DEVICE — TUBING CONMED AIRSEAL SMOKE EVAC INSUFFLATION ASM-EVAC

## (undated) DEVICE — SUCTION CANISTER MEDIVAC LINER 3000ML W/LID 65651-530

## (undated) DEVICE — SU WND CLOSURE VLOC 90 ABS 3-0 VIOLET 6" CV-23 VLOCM0804

## (undated) DEVICE — SYR BULB IRRIG 50ML LATEX FREE 0035280

## (undated) DEVICE — CLIP ENDO HEMO-LOC PURPLE LG 544240

## (undated) DEVICE — ENDO POUCH UNIV RETRIEVAL SYSTEM INZII 10MM CD001

## (undated) DEVICE — APPLICATOR VISTASEAL LAPAROSCOPIC FLEX TIP 45CM VSTL45

## (undated) DEVICE — SPONGE RAY-TEC 4X8" 7318

## (undated) DEVICE — DRSG TEGADERM 2 1/2X 2 3/4"

## (undated) DEVICE — KIT PATIENT POSITIONING PIGAZZI LATEX FREE 40580

## (undated) DEVICE — DAVINCI XI SEAL UNIVERSAL 5-8MM 470361

## (undated) DEVICE — SUCTION IRR STRYKERFLOW II W/TIP 250-070-520

## (undated) DEVICE — PACK DAVINCI UROLOGY SBA15UDFSG

## (undated) DEVICE — SURGICEL ABSORBABLE HEMOSTAT SNOW 2"X4" 2082

## (undated) DEVICE — LINEN TOWEL PACK X5 5464

## (undated) DEVICE — DEVICE SUTURE PASSER 14GA WECK EFX EFXSP2

## (undated) DEVICE — CATH FOLEY COUNCIL 16FR 5ML LATEX 0196L16

## (undated) DEVICE — ENDO TROCAR FIRST ENTRY KII FIOS Z-THRD 12X100MM CTF73

## (undated) DEVICE — CLIP ENDO HEMO-LOC GREEN MED/LG 544230

## (undated) DEVICE — SU MONOCRYL 4-0 PS-2 18" UND Y496G

## (undated) DEVICE — SYSTEM LAPAROVUE VISIBILITY LAPVUE10

## (undated) DEVICE — GLOVE PROTEXIS W/NEU-THERA 7.5  2D73TE75

## (undated) DEVICE — ENDO TROCAR FIRST ENTRY KII FIOS Z-THRD 11X100MM CTF33

## (undated) DEVICE — SYR 70ML TOOMEY 041170

## (undated) DEVICE — GRASPER LAPAROSCOPIC EPIX 5MMX35CM C4130

## (undated) DEVICE — CATH FOLEY 18FR 5ML LATEX 0165SI18

## (undated) DEVICE — CATH FOLEY COUNCIL 18FR 5ML LATEX 0196SI18

## (undated) DEVICE — BLADE CLIPPER SGL USE 9680

## (undated) DEVICE — DAVINCI HOT SHEARS TIP COVER  400180

## (undated) DEVICE — ANTIFOG SOLUTION W/FOAM PAD CF-1001

## (undated) DEVICE — ADH SKIN CLOSURE PREMIERPRO EXOFIN 1.0ML 3470

## (undated) DEVICE — SYR 10ML FINGER CONTROL W/O NDL 309695

## (undated) DEVICE — SU MONOCRYL 3-0 RB-1 27" Y305H

## (undated) DEVICE — SU VICRYL 0 UR-6 27" J603H

## (undated) DEVICE — ESU GROUND PAD UNIVERSAL W/O CORD

## (undated) DEVICE — SYR 50ML LL W/O NDL 309653

## (undated) DEVICE — SOL WATER IRRIG 1000ML BOTTLE 2F7114

## (undated) DEVICE — RX VISTASEAL FIBRIN SEALANT W/THROMBIN 10ML VST10

## (undated) DEVICE — DAVINCI XI DRAPE COLUMN 470341

## (undated) DEVICE — SOL NACL 0.9% IRRIG 1000ML BOTTLE 2F7124

## (undated) RX ORDER — LIDOCAINE HYDROCHLORIDE 10 MG/ML
INJECTION, SOLUTION EPIDURAL; INFILTRATION; INTRACAUDAL; PERINEURAL
Status: DISPENSED
Start: 2024-10-24

## (undated) RX ORDER — NEOSTIGMINE METHYLSULFATE 1 MG/ML
VIAL (ML) INJECTION
Status: DISPENSED
Start: 2022-08-16

## (undated) RX ORDER — HYDROMORPHONE HCL IN WATER/PF 6 MG/30 ML
PATIENT CONTROLLED ANALGESIA SYRINGE INTRAVENOUS
Status: DISPENSED
Start: 2022-08-16

## (undated) RX ORDER — BUPIVACAINE HYDROCHLORIDE 5 MG/ML
INJECTION, SOLUTION EPIDURAL; INTRACAUDAL
Status: DISPENSED
Start: 2022-08-16

## (undated) RX ORDER — ONDANSETRON 2 MG/ML
INJECTION INTRAMUSCULAR; INTRAVENOUS
Status: DISPENSED
Start: 2022-08-16

## (undated) RX ORDER — PROPOFOL 10 MG/ML
INJECTION, EMULSION INTRAVENOUS
Status: DISPENSED
Start: 2022-08-16

## (undated) RX ORDER — FENTANYL CITRATE 50 UG/ML
INJECTION, SOLUTION INTRAMUSCULAR; INTRAVENOUS
Status: DISPENSED
Start: 2022-08-16

## (undated) RX ORDER — GLYCOPYRROLATE 0.2 MG/ML
INJECTION, SOLUTION INTRAMUSCULAR; INTRAVENOUS
Status: DISPENSED
Start: 2022-08-16

## (undated) RX ORDER — CEFAZOLIN SODIUM 1 G/3ML
INJECTION, POWDER, FOR SOLUTION INTRAMUSCULAR; INTRAVENOUS
Status: DISPENSED
Start: 2022-08-16

## (undated) RX ORDER — FENTANYL CITRATE 0.05 MG/ML
INJECTION, SOLUTION INTRAMUSCULAR; INTRAVENOUS
Status: DISPENSED
Start: 2022-08-16

## (undated) RX ORDER — ACETAMINOPHEN 325 MG/1
TABLET ORAL
Status: DISPENSED
Start: 2022-08-16

## (undated) RX ORDER — DEXAMETHASONE SODIUM PHOSPHATE 4 MG/ML
INJECTION, SOLUTION INTRA-ARTICULAR; INTRALESIONAL; INTRAMUSCULAR; INTRAVENOUS; SOFT TISSUE
Status: DISPENSED
Start: 2022-08-16

## (undated) RX ORDER — TRIAMCINOLONE ACETONIDE 40 MG/ML
INJECTION, SUSPENSION INTRA-ARTICULAR; INTRAMUSCULAR
Status: DISPENSED
Start: 2024-10-24

## (undated) RX ORDER — HYDROMORPHONE HYDROCHLORIDE 1 MG/ML
INJECTION, SOLUTION INTRAMUSCULAR; INTRAVENOUS; SUBCUTANEOUS
Status: DISPENSED
Start: 2022-08-16

## (undated) RX ORDER — LIDOCAINE HYDROCHLORIDE 20 MG/ML
INJECTION, SOLUTION EPIDURAL; INFILTRATION; INTRACAUDAL; PERINEURAL
Status: DISPENSED
Start: 2022-08-16